# Patient Record
Sex: MALE | Race: WHITE | NOT HISPANIC OR LATINO | URBAN - METROPOLITAN AREA
[De-identification: names, ages, dates, MRNs, and addresses within clinical notes are randomized per-mention and may not be internally consistent; named-entity substitution may affect disease eponyms.]

---

## 2017-06-09 ENCOUNTER — OUTPATIENT (OUTPATIENT)
Dept: OUTPATIENT SERVICES | Facility: HOSPITAL | Age: 72
LOS: 1 days | Discharge: HOME | End: 2017-06-09

## 2017-06-09 DIAGNOSIS — K40.90 UNILATERAL INGUINAL HERNIA, WITHOUT OBSTRUCTION OR GANGRENE, NOT SPECIFIED AS RECURRENT: ICD-10-CM

## 2017-06-09 DIAGNOSIS — I10 ESSENTIAL (PRIMARY) HYPERTENSION: ICD-10-CM

## 2017-06-09 DIAGNOSIS — I48.91 UNSPECIFIED ATRIAL FIBRILLATION: ICD-10-CM

## 2017-06-23 ENCOUNTER — OUTPATIENT (OUTPATIENT)
Dept: OUTPATIENT SERVICES | Facility: HOSPITAL | Age: 72
LOS: 1 days | Discharge: HOME | End: 2017-06-23

## 2017-06-26 ENCOUNTER — EMERGENCY (EMERGENCY)
Facility: HOSPITAL | Age: 72
LOS: 0 days | Discharge: HOME | End: 2017-06-26

## 2017-06-26 DIAGNOSIS — Z98.890 OTHER SPECIFIED POSTPROCEDURAL STATES: ICD-10-CM

## 2017-06-26 DIAGNOSIS — Z79.899 OTHER LONG TERM (CURRENT) DRUG THERAPY: ICD-10-CM

## 2017-06-26 DIAGNOSIS — R06.02 SHORTNESS OF BREATH: ICD-10-CM

## 2017-06-26 DIAGNOSIS — I48.91 UNSPECIFIED ATRIAL FIBRILLATION: ICD-10-CM

## 2017-06-26 DIAGNOSIS — Z79.01 LONG TERM (CURRENT) USE OF ANTICOAGULANTS: ICD-10-CM

## 2017-06-26 DIAGNOSIS — I10 ESSENTIAL (PRIMARY) HYPERTENSION: ICD-10-CM

## 2017-06-28 DIAGNOSIS — K40.20 BILATERAL INGUINAL HERNIA, WITHOUT OBSTRUCTION OR GANGRENE, NOT SPECIFIED AS RECURRENT: ICD-10-CM

## 2017-06-28 DIAGNOSIS — Z01.818 ENCOUNTER FOR OTHER PREPROCEDURAL EXAMINATION: ICD-10-CM

## 2017-06-30 DIAGNOSIS — K40.20 BILATERAL INGUINAL HERNIA, WITHOUT OBSTRUCTION OR GANGRENE, NOT SPECIFIED AS RECURRENT: ICD-10-CM

## 2017-06-30 DIAGNOSIS — K41.20 BILATERAL FEMORAL HERNIA, WITHOUT OBSTRUCTION OR GANGRENE, NOT SPECIFIED AS RECURRENT: ICD-10-CM

## 2018-03-27 ENCOUNTER — OUTPATIENT (OUTPATIENT)
Dept: OUTPATIENT SERVICES | Facility: HOSPITAL | Age: 73
LOS: 1 days | Discharge: HOME | End: 2018-03-27

## 2018-03-27 VITALS
OXYGEN SATURATION: 98 % | DIASTOLIC BLOOD PRESSURE: 79 MMHG | RESPIRATION RATE: 16 BRPM | TEMPERATURE: 97 F | WEIGHT: 214.95 LBS | HEART RATE: 60 BPM | SYSTOLIC BLOOD PRESSURE: 141 MMHG

## 2018-03-27 DIAGNOSIS — Z01.818 ENCOUNTER FOR OTHER PREPROCEDURAL EXAMINATION: ICD-10-CM

## 2018-03-27 DIAGNOSIS — K40.20 BILATERAL INGUINAL HERNIA, WITHOUT OBSTRUCTION OR GANGRENE, NOT SPECIFIED AS RECURRENT: Chronic | ICD-10-CM

## 2018-03-27 DIAGNOSIS — Z85.89 PERSONAL HISTORY OF MALIGNANT NEOPLASM OF OTHER ORGANS AND SYSTEMS: Chronic | ICD-10-CM

## 2018-03-27 DIAGNOSIS — K40.90 UNILATERAL INGUINAL HERNIA, WITHOUT OBSTRUCTION OR GANGRENE, NOT SPECIFIED AS RECURRENT: ICD-10-CM

## 2018-03-27 LAB
ALBUMIN SERPL ELPH-MCNC: 3.9 G/DL — SIGNIFICANT CHANGE UP (ref 3.5–5.2)
ALP SERPL-CCNC: 78 U/L — SIGNIFICANT CHANGE UP (ref 30–115)
ALT FLD-CCNC: 18 U/L — SIGNIFICANT CHANGE UP (ref 0–41)
ANION GAP SERPL CALC-SCNC: 13 MMOL/L — SIGNIFICANT CHANGE UP (ref 7–14)
APPEARANCE UR: CLEAR — SIGNIFICANT CHANGE UP
AST SERPL-CCNC: 22 U/L — SIGNIFICANT CHANGE UP (ref 0–41)
BASOPHILS # BLD AUTO: 0.03 K/UL — SIGNIFICANT CHANGE UP (ref 0–0.2)
BASOPHILS NFR BLD AUTO: 0.4 % — SIGNIFICANT CHANGE UP (ref 0–1)
BILIRUB SERPL-MCNC: 0.3 MG/DL — SIGNIFICANT CHANGE UP (ref 0.2–1.2)
BILIRUB UR-MCNC: NEGATIVE — SIGNIFICANT CHANGE UP
BUN SERPL-MCNC: 21 MG/DL — HIGH (ref 10–20)
CALCIUM SERPL-MCNC: 8.7 MG/DL — SIGNIFICANT CHANGE UP (ref 8.5–10.1)
CHLORIDE SERPL-SCNC: 104 MMOL/L — SIGNIFICANT CHANGE UP (ref 98–110)
CO2 SERPL-SCNC: 26 MMOL/L — SIGNIFICANT CHANGE UP (ref 17–32)
COLOR SPEC: YELLOW — SIGNIFICANT CHANGE UP
CREAT SERPL-MCNC: 0.9 MG/DL — SIGNIFICANT CHANGE UP (ref 0.7–1.5)
DIFF PNL FLD: NEGATIVE — SIGNIFICANT CHANGE UP
EOSINOPHIL # BLD AUTO: 0.19 K/UL — SIGNIFICANT CHANGE UP (ref 0–0.7)
EOSINOPHIL NFR BLD AUTO: 2.8 % — SIGNIFICANT CHANGE UP (ref 0–8)
GLUCOSE SERPL-MCNC: 118 MG/DL — HIGH (ref 70–99)
GLUCOSE UR QL: NEGATIVE — SIGNIFICANT CHANGE UP
HCT VFR BLD CALC: 40 % — LOW (ref 42–52)
HGB BLD-MCNC: 12.5 G/DL — LOW (ref 14–18)
IMM GRANULOCYTES NFR BLD AUTO: 0.3 % — SIGNIFICANT CHANGE UP (ref 0.1–0.3)
KETONES UR-MCNC: NEGATIVE — SIGNIFICANT CHANGE UP
LEUKOCYTE ESTERASE UR-ACNC: NEGATIVE — SIGNIFICANT CHANGE UP
LYMPHOCYTES # BLD AUTO: 1.04 K/UL — LOW (ref 1.2–3.4)
LYMPHOCYTES # BLD AUTO: 15.5 % — LOW (ref 20.5–51.1)
MCHC RBC-ENTMCNC: 24.7 PG — LOW (ref 27–31)
MCHC RBC-ENTMCNC: 31.3 G/DL — LOW (ref 32–37)
MCV RBC AUTO: 78.9 FL — LOW (ref 80–94)
MONOCYTES # BLD AUTO: 0.69 K/UL — HIGH (ref 0.1–0.6)
MONOCYTES NFR BLD AUTO: 10.3 % — HIGH (ref 1.7–9.3)
NEUTROPHILS # BLD AUTO: 4.72 K/UL — SIGNIFICANT CHANGE UP (ref 1.4–6.5)
NEUTROPHILS NFR BLD AUTO: 70.7 % — SIGNIFICANT CHANGE UP (ref 42.2–75.2)
NITRITE UR-MCNC: NEGATIVE — SIGNIFICANT CHANGE UP
NRBC # BLD: 0 /100 WBCS — SIGNIFICANT CHANGE UP (ref 0–0)
PH UR: 6 — SIGNIFICANT CHANGE UP (ref 5–8)
PLATELET # BLD AUTO: 209 K/UL — SIGNIFICANT CHANGE UP (ref 130–400)
POTASSIUM SERPL-MCNC: 4.5 MMOL/L — SIGNIFICANT CHANGE UP (ref 3.5–5)
POTASSIUM SERPL-SCNC: 4.5 MMOL/L — SIGNIFICANT CHANGE UP (ref 3.5–5)
PROT SERPL-MCNC: 6.4 G/DL — SIGNIFICANT CHANGE UP (ref 6–8)
PROT UR-MCNC: (no result)
RBC # BLD: 5.07 M/UL — SIGNIFICANT CHANGE UP (ref 4.7–6.1)
RBC # FLD: 14.9 % — HIGH (ref 11.5–14.5)
RBC CASTS # UR COMP ASSIST: SIGNIFICANT CHANGE UP /HPF
SODIUM SERPL-SCNC: 143 MMOL/L — SIGNIFICANT CHANGE UP (ref 135–146)
SP GR SPEC: >=1.03 — SIGNIFICANT CHANGE UP (ref 1.01–1.03)
UROBILINOGEN FLD QL: 0.2 — SIGNIFICANT CHANGE UP (ref 0.2–0.2)
WBC # BLD: 6.69 K/UL — SIGNIFICANT CHANGE UP (ref 4.8–10.8)
WBC # FLD AUTO: 6.69 K/UL — SIGNIFICANT CHANGE UP (ref 4.8–10.8)
WBC UR QL: SIGNIFICANT CHANGE UP /HPF

## 2018-03-28 LAB
APTT BLD: 29.5 SEC — SIGNIFICANT CHANGE UP (ref 27–39.2)
INR BLD: 1.16 RATIO — SIGNIFICANT CHANGE UP (ref 0.65–1.3)
PROTHROM AB SERPL-ACNC: 12.6 SEC — SIGNIFICANT CHANGE UP (ref 9.95–12.87)

## 2018-04-06 ENCOUNTER — OUTPATIENT (OUTPATIENT)
Dept: OUTPATIENT SERVICES | Facility: HOSPITAL | Age: 73
LOS: 1 days | Discharge: HOME | End: 2018-04-06

## 2018-04-06 VITALS
HEART RATE: 54 BPM | TEMPERATURE: 98 F | DIASTOLIC BLOOD PRESSURE: 69 MMHG | SYSTOLIC BLOOD PRESSURE: 134 MMHG | OXYGEN SATURATION: 100 % | RESPIRATION RATE: 16 BRPM | WEIGHT: 214.95 LBS

## 2018-04-06 VITALS
OXYGEN SATURATION: 99 % | HEART RATE: 68 BPM | SYSTOLIC BLOOD PRESSURE: 126 MMHG | RESPIRATION RATE: 16 BRPM | DIASTOLIC BLOOD PRESSURE: 76 MMHG

## 2018-04-06 DIAGNOSIS — Z85.89 PERSONAL HISTORY OF MALIGNANT NEOPLASM OF OTHER ORGANS AND SYSTEMS: Chronic | ICD-10-CM

## 2018-04-06 DIAGNOSIS — K40.20 BILATERAL INGUINAL HERNIA, WITHOUT OBSTRUCTION OR GANGRENE, NOT SPECIFIED AS RECURRENT: Chronic | ICD-10-CM

## 2018-04-06 RX ORDER — SODIUM CHLORIDE 9 MG/ML
1000 INJECTION, SOLUTION INTRAVENOUS
Qty: 0 | Refills: 0 | Status: DISCONTINUED | OUTPATIENT
Start: 2018-04-06 | End: 2018-04-21

## 2018-04-06 RX ORDER — MORPHINE SULFATE 50 MG/1
2 CAPSULE, EXTENDED RELEASE ORAL
Qty: 0 | Refills: 0 | Status: DISCONTINUED | OUTPATIENT
Start: 2018-04-06 | End: 2018-04-06

## 2018-04-06 RX ORDER — MORPHINE SULFATE 50 MG/1
1 CAPSULE, EXTENDED RELEASE ORAL
Qty: 0 | Refills: 0 | Status: DISCONTINUED | OUTPATIENT
Start: 2018-04-06 | End: 2018-04-06

## 2018-04-06 RX ORDER — HYDROMORPHONE HYDROCHLORIDE 2 MG/ML
1 INJECTION INTRAMUSCULAR; INTRAVENOUS; SUBCUTANEOUS
Qty: 0 | Refills: 0 | Status: DISCONTINUED | OUTPATIENT
Start: 2018-04-06 | End: 2018-04-06

## 2018-04-06 RX ORDER — OXYCODONE AND ACETAMINOPHEN 5; 325 MG/1; MG/1
1 TABLET ORAL EVERY 4 HOURS
Qty: 0 | Refills: 0 | Status: DISCONTINUED | OUTPATIENT
Start: 2018-04-06 | End: 2018-04-06

## 2018-04-06 RX ORDER — ONDANSETRON 8 MG/1
4 TABLET, FILM COATED ORAL ONCE
Qty: 0 | Refills: 0 | Status: DISCONTINUED | OUTPATIENT
Start: 2018-04-06 | End: 2018-04-21

## 2018-04-06 RX ORDER — ACETAMINOPHEN WITH CODEINE 300MG-30MG
1 TABLET ORAL
Qty: 20 | Refills: 0 | OUTPATIENT
Start: 2018-04-06 | End: 2018-04-10

## 2018-04-06 RX ADMIN — SODIUM CHLORIDE 100 MILLILITER(S): 9 INJECTION, SOLUTION INTRAVENOUS at 09:50

## 2018-04-06 NOTE — BRIEF OPERATIVE NOTE - PROCEDURE
<<-----Click on this checkbox to enter Procedure Open repair of left inguinal hernia with mesh  04/06/2018    Active  DRANEV

## 2018-04-11 DIAGNOSIS — K40.91 UNILATERAL INGUINAL HERNIA, WITHOUT OBSTRUCTION OR GANGRENE, RECURRENT: ICD-10-CM

## 2018-04-30 ENCOUNTER — INPATIENT (INPATIENT)
Facility: HOSPITAL | Age: 73
LOS: 1 days | Discharge: HOME | End: 2018-05-02
Attending: INTERNAL MEDICINE | Admitting: INTERNAL MEDICINE

## 2018-04-30 VITALS
TEMPERATURE: 96 F | RESPIRATION RATE: 16 BRPM | OXYGEN SATURATION: 94 % | WEIGHT: 220.02 LBS | HEART RATE: 91 BPM | SYSTOLIC BLOOD PRESSURE: 130 MMHG | DIASTOLIC BLOOD PRESSURE: 67 MMHG

## 2018-04-30 DIAGNOSIS — R56.9 UNSPECIFIED CONVULSIONS: ICD-10-CM

## 2018-04-30 DIAGNOSIS — K40.20 BILATERAL INGUINAL HERNIA, WITHOUT OBSTRUCTION OR GANGRENE, NOT SPECIFIED AS RECURRENT: Chronic | ICD-10-CM

## 2018-04-30 DIAGNOSIS — K29.70 GASTRITIS, UNSPECIFIED, WITHOUT BLEEDING: ICD-10-CM

## 2018-04-30 DIAGNOSIS — I48.91 UNSPECIFIED ATRIAL FIBRILLATION: ICD-10-CM

## 2018-04-30 DIAGNOSIS — E87.1 HYPO-OSMOLALITY AND HYPONATREMIA: ICD-10-CM

## 2018-04-30 DIAGNOSIS — I10 ESSENTIAL (PRIMARY) HYPERTENSION: ICD-10-CM

## 2018-04-30 DIAGNOSIS — Z85.89 PERSONAL HISTORY OF MALIGNANT NEOPLASM OF OTHER ORGANS AND SYSTEMS: Chronic | ICD-10-CM

## 2018-04-30 DIAGNOSIS — E78.5 HYPERLIPIDEMIA, UNSPECIFIED: ICD-10-CM

## 2018-04-30 LAB
ALBUMIN SERPL ELPH-MCNC: 4.1 G/DL — SIGNIFICANT CHANGE UP (ref 3.5–5.2)
ALP SERPL-CCNC: 69 U/L — SIGNIFICANT CHANGE UP (ref 30–115)
ALT FLD-CCNC: 16 U/L — SIGNIFICANT CHANGE UP (ref 0–41)
ANION GAP SERPL CALC-SCNC: 12 MMOL/L — SIGNIFICANT CHANGE UP (ref 7–14)
ANION GAP SERPL CALC-SCNC: 13 MMOL/L — SIGNIFICANT CHANGE UP (ref 7–14)
ANION GAP SERPL CALC-SCNC: 13 MMOL/L — SIGNIFICANT CHANGE UP (ref 7–14)
APPEARANCE UR: CLEAR — SIGNIFICANT CHANGE UP
APTT BLD: 19.4 SEC — CRITICAL LOW (ref 27–39.2)
AST SERPL-CCNC: 22 U/L — SIGNIFICANT CHANGE UP (ref 0–41)
BACTERIA # UR AUTO: (no result) /HPF
BILIRUB SERPL-MCNC: 0.5 MG/DL — SIGNIFICANT CHANGE UP (ref 0.2–1.2)
BILIRUB UR-MCNC: NEGATIVE — SIGNIFICANT CHANGE UP
BUN SERPL-MCNC: 13 MG/DL — SIGNIFICANT CHANGE UP (ref 10–20)
BUN SERPL-MCNC: 19 MG/DL — SIGNIFICANT CHANGE UP (ref 10–20)
BUN SERPL-MCNC: 20 MG/DL — SIGNIFICANT CHANGE UP (ref 10–20)
CALCIUM SERPL-MCNC: 8.5 MG/DL — SIGNIFICANT CHANGE UP (ref 8.5–10.1)
CALCIUM SERPL-MCNC: 8.6 MG/DL — SIGNIFICANT CHANGE UP (ref 8.5–10.1)
CALCIUM SERPL-MCNC: 8.7 MG/DL — SIGNIFICANT CHANGE UP (ref 8.5–10.1)
CHLORIDE SERPL-SCNC: 91 MMOL/L — LOW (ref 98–110)
CHLORIDE SERPL-SCNC: 93 MMOL/L — LOW (ref 98–110)
CHLORIDE SERPL-SCNC: 99 MMOL/L — SIGNIFICANT CHANGE UP (ref 98–110)
CK MB CFR SERPL CALC: 4 NG/ML — SIGNIFICANT CHANGE UP (ref 0.6–6.3)
CO2 SERPL-SCNC: 22 MMOL/L — SIGNIFICANT CHANGE UP (ref 17–32)
CO2 SERPL-SCNC: 24 MMOL/L — SIGNIFICANT CHANGE UP (ref 17–32)
CO2 SERPL-SCNC: 25 MMOL/L — SIGNIFICANT CHANGE UP (ref 17–32)
COLOR SPEC: YELLOW — SIGNIFICANT CHANGE UP
CREAT SERPL-MCNC: 0.7 MG/DL — SIGNIFICANT CHANGE UP (ref 0.7–1.5)
CREAT SERPL-MCNC: 0.8 MG/DL — SIGNIFICANT CHANGE UP (ref 0.7–1.5)
CREAT SERPL-MCNC: 0.9 MG/DL — SIGNIFICANT CHANGE UP (ref 0.7–1.5)
DIFF PNL FLD: (no result)
GAS PNL BLDV: SIGNIFICANT CHANGE UP
GLUCOSE SERPL-MCNC: 106 MG/DL — HIGH (ref 70–99)
GLUCOSE SERPL-MCNC: 139 MG/DL — HIGH (ref 70–99)
GLUCOSE SERPL-MCNC: 181 MG/DL — HIGH (ref 70–99)
GLUCOSE UR QL: 100 MG/DL
HCT VFR BLD CALC: 37.1 % — LOW (ref 42–52)
HGB BLD-MCNC: 11.8 G/DL — LOW (ref 14–18)
INR BLD: 1.13 RATIO — SIGNIFICANT CHANGE UP (ref 0.65–1.3)
KETONES UR-MCNC: NEGATIVE — SIGNIFICANT CHANGE UP
LEUKOCYTE ESTERASE UR-ACNC: NEGATIVE — SIGNIFICANT CHANGE UP
MAGNESIUM SERPL-MCNC: 1.8 MG/DL — SIGNIFICANT CHANGE UP (ref 1.8–2.4)
MCHC RBC-ENTMCNC: 24.4 PG — LOW (ref 27–31)
MCHC RBC-ENTMCNC: 31.8 G/DL — LOW (ref 32–37)
MCV RBC AUTO: 76.7 FL — LOW (ref 80–94)
NITRITE UR-MCNC: NEGATIVE — SIGNIFICANT CHANGE UP
NRBC # BLD: 0 /100 WBCS — SIGNIFICANT CHANGE UP (ref 0–0)
OSMOLALITY SERPL: 276 MOS/KG — LOW (ref 289–308)
PH UR: 6.5 — SIGNIFICANT CHANGE UP (ref 5–8)
PLATELET # BLD AUTO: 189 K/UL — SIGNIFICANT CHANGE UP (ref 130–400)
POTASSIUM SERPL-MCNC: 4 MMOL/L — SIGNIFICANT CHANGE UP (ref 3.5–5)
POTASSIUM SERPL-MCNC: 4.1 MMOL/L — SIGNIFICANT CHANGE UP (ref 3.5–5)
POTASSIUM SERPL-MCNC: 4.6 MMOL/L — SIGNIFICANT CHANGE UP (ref 3.5–5)
POTASSIUM SERPL-SCNC: 4 MMOL/L — SIGNIFICANT CHANGE UP (ref 3.5–5)
POTASSIUM SERPL-SCNC: 4.1 MMOL/L — SIGNIFICANT CHANGE UP (ref 3.5–5)
POTASSIUM SERPL-SCNC: 4.6 MMOL/L — SIGNIFICANT CHANGE UP (ref 3.5–5)
PROT SERPL-MCNC: 6.6 G/DL — SIGNIFICANT CHANGE UP (ref 6–8)
PROT UR-MCNC: NEGATIVE MG/DL — SIGNIFICANT CHANGE UP
PROTHROM AB SERPL-ACNC: 12.2 SEC — SIGNIFICANT CHANGE UP (ref 9.95–12.87)
RBC # BLD: 4.84 M/UL — SIGNIFICANT CHANGE UP (ref 4.7–6.1)
RBC # FLD: 14.9 % — HIGH (ref 11.5–14.5)
RBC CASTS # UR COMP ASSIST: SIGNIFICANT CHANGE UP /HPF
SODIUM SERPL-SCNC: 126 MMOL/L — LOW (ref 135–146)
SODIUM SERPL-SCNC: 130 MMOL/L — LOW (ref 135–146)
SODIUM SERPL-SCNC: 136 MMOL/L — SIGNIFICANT CHANGE UP (ref 135–146)
SP GR SPEC: 1.01 — SIGNIFICANT CHANGE UP (ref 1.01–1.03)
TROPONIN T SERPL-MCNC: <0.01 NG/ML — SIGNIFICANT CHANGE UP
UROBILINOGEN FLD QL: 0.2 MG/DL — SIGNIFICANT CHANGE UP (ref 0.2–0.2)
WBC # BLD: 11.9 K/UL — HIGH (ref 4.8–10.8)
WBC # FLD AUTO: 11.9 K/UL — HIGH (ref 4.8–10.8)

## 2018-04-30 RX ORDER — ONDANSETRON 8 MG/1
4 TABLET, FILM COATED ORAL EVERY 4 HOURS
Qty: 0 | Refills: 0 | Status: DISCONTINUED | OUTPATIENT
Start: 2018-04-30 | End: 2018-05-02

## 2018-04-30 RX ORDER — APIXABAN 2.5 MG/1
5 TABLET, FILM COATED ORAL ONCE
Qty: 0 | Refills: 0 | Status: COMPLETED | OUTPATIENT
Start: 2018-04-30 | End: 2018-04-30

## 2018-04-30 RX ORDER — LEVETIRACETAM 250 MG/1
1000 TABLET, FILM COATED ORAL ONCE
Qty: 0 | Refills: 0 | Status: COMPLETED | OUTPATIENT
Start: 2018-04-30 | End: 2018-04-30

## 2018-04-30 RX ORDER — SOTALOL HCL 120 MG
120 TABLET ORAL EVERY 12 HOURS
Qty: 0 | Refills: 0 | Status: DISCONTINUED | OUTPATIENT
Start: 2018-04-30 | End: 2018-05-01

## 2018-04-30 RX ORDER — AMLODIPINE BESYLATE 2.5 MG/1
0 TABLET ORAL
Qty: 0 | Refills: 0 | COMMUNITY

## 2018-04-30 RX ORDER — LANOLIN ALCOHOL/MO/W.PET/CERES
0 CREAM (GRAM) TOPICAL
Qty: 0 | Refills: 0 | COMMUNITY

## 2018-04-30 RX ORDER — SODIUM CHLORIDE 9 MG/ML
2000 INJECTION, SOLUTION INTRAVENOUS ONCE
Qty: 0 | Refills: 0 | Status: COMPLETED | OUTPATIENT
Start: 2018-04-30 | End: 2018-04-30

## 2018-04-30 RX ORDER — SOTALOL HCL 120 MG
120 TABLET ORAL ONCE
Qty: 0 | Refills: 0 | Status: COMPLETED | OUTPATIENT
Start: 2018-04-30 | End: 2018-04-30

## 2018-04-30 RX ORDER — ACETAMINOPHEN 500 MG
650 TABLET ORAL ONCE
Qty: 0 | Refills: 0 | Status: COMPLETED | OUTPATIENT
Start: 2018-04-30 | End: 2018-04-30

## 2018-04-30 RX ORDER — HYDROMORPHONE HYDROCHLORIDE 2 MG/ML
1 INJECTION INTRAMUSCULAR; INTRAVENOUS; SUBCUTANEOUS ONCE
Qty: 0 | Refills: 0 | Status: DISCONTINUED | OUTPATIENT
Start: 2018-04-30 | End: 2018-04-30

## 2018-04-30 RX ORDER — SOTALOL HCL 120 MG
1 TABLET ORAL
Qty: 0 | Refills: 0 | COMMUNITY

## 2018-04-30 RX ORDER — OXYCODONE AND ACETAMINOPHEN 5; 325 MG/1; MG/1
1 TABLET ORAL EVERY 6 HOURS
Qty: 0 | Refills: 0 | Status: DISCONTINUED | OUTPATIENT
Start: 2018-04-30 | End: 2018-05-02

## 2018-04-30 RX ORDER — MORPHINE SULFATE 50 MG/1
2 CAPSULE, EXTENDED RELEASE ORAL EVERY 6 HOURS
Qty: 0 | Refills: 0 | Status: DISCONTINUED | OUTPATIENT
Start: 2018-04-30 | End: 2018-05-02

## 2018-04-30 RX ORDER — BENAZEPRIL HYDROCHLORIDE 40 MG/1
1 TABLET ORAL
Qty: 0 | Refills: 0 | COMMUNITY

## 2018-04-30 RX ORDER — PANTOPRAZOLE SODIUM 20 MG/1
40 TABLET, DELAYED RELEASE ORAL
Qty: 0 | Refills: 0 | Status: DISCONTINUED | OUTPATIENT
Start: 2018-04-30 | End: 2018-05-02

## 2018-04-30 RX ORDER — APIXABAN 2.5 MG/1
5 TABLET, FILM COATED ORAL EVERY 12 HOURS
Qty: 0 | Refills: 0 | Status: DISCONTINUED | OUTPATIENT
Start: 2018-04-30 | End: 2018-05-02

## 2018-04-30 RX ORDER — SODIUM CHLORIDE 9 MG/ML
1000 INJECTION INTRAMUSCULAR; INTRAVENOUS; SUBCUTANEOUS ONCE
Qty: 0 | Refills: 0 | Status: COMPLETED | OUTPATIENT
Start: 2018-04-30 | End: 2018-04-30

## 2018-04-30 RX ORDER — LANOLIN ALCOHOL/MO/W.PET/CERES
10 CREAM (GRAM) TOPICAL AT BEDTIME
Qty: 0 | Refills: 0 | Status: DISCONTINUED | OUTPATIENT
Start: 2018-04-30 | End: 2018-04-30

## 2018-04-30 RX ORDER — ATORVASTATIN CALCIUM 80 MG/1
80 TABLET, FILM COATED ORAL AT BEDTIME
Qty: 0 | Refills: 0 | Status: DISCONTINUED | OUTPATIENT
Start: 2018-04-30 | End: 2018-05-02

## 2018-04-30 RX ORDER — LISINOPRIL 2.5 MG/1
40 TABLET ORAL DAILY
Qty: 0 | Refills: 0 | Status: DISCONTINUED | OUTPATIENT
Start: 2018-04-30 | End: 2018-05-02

## 2018-04-30 RX ORDER — MAGNESIUM SULFATE 500 MG/ML
2 VIAL (ML) INJECTION ONCE
Qty: 0 | Refills: 0 | Status: COMPLETED | OUTPATIENT
Start: 2018-04-30 | End: 2018-04-30

## 2018-04-30 RX ADMIN — LISINOPRIL 40 MILLIGRAM(S): 2.5 TABLET ORAL at 13:27

## 2018-04-30 RX ADMIN — HYDROMORPHONE HYDROCHLORIDE 1 MILLIGRAM(S): 2 INJECTION INTRAMUSCULAR; INTRAVENOUS; SUBCUTANEOUS at 14:15

## 2018-04-30 RX ADMIN — LEVETIRACETAM 400 MILLIGRAM(S): 250 TABLET, FILM COATED ORAL at 04:47

## 2018-04-30 RX ADMIN — SODIUM CHLORIDE 2000 MILLILITER(S): 9 INJECTION INTRAMUSCULAR; INTRAVENOUS; SUBCUTANEOUS at 05:59

## 2018-04-30 RX ADMIN — ONDANSETRON 4 MILLIGRAM(S): 8 TABLET, FILM COATED ORAL at 18:59

## 2018-04-30 RX ADMIN — ATORVASTATIN CALCIUM 80 MILLIGRAM(S): 80 TABLET, FILM COATED ORAL at 22:01

## 2018-04-30 RX ADMIN — SODIUM CHLORIDE 1000 MILLILITER(S): 9 INJECTION, SOLUTION INTRAVENOUS at 04:35

## 2018-04-30 RX ADMIN — MORPHINE SULFATE 2 MILLIGRAM(S): 50 CAPSULE, EXTENDED RELEASE ORAL at 11:31

## 2018-04-30 RX ADMIN — APIXABAN 5 MILLIGRAM(S): 2.5 TABLET, FILM COATED ORAL at 18:19

## 2018-04-30 RX ADMIN — MORPHINE SULFATE 2 MILLIGRAM(S): 50 CAPSULE, EXTENDED RELEASE ORAL at 13:14

## 2018-04-30 RX ADMIN — Medication 650 MILLIGRAM(S): at 11:31

## 2018-04-30 RX ADMIN — Medication 50 GRAM(S): at 17:55

## 2018-04-30 RX ADMIN — Medication 120 MILLIGRAM(S): at 18:20

## 2018-04-30 RX ADMIN — Medication 120 MILLIGRAM(S): at 13:58

## 2018-04-30 RX ADMIN — PANTOPRAZOLE SODIUM 40 MILLIGRAM(S): 20 TABLET, DELAYED RELEASE ORAL at 13:27

## 2018-04-30 RX ADMIN — HYDROMORPHONE HYDROCHLORIDE 1 MILLIGRAM(S): 2 INJECTION INTRAMUSCULAR; INTRAVENOUS; SUBCUTANEOUS at 14:06

## 2018-04-30 RX ADMIN — APIXABAN 5 MILLIGRAM(S): 2.5 TABLET, FILM COATED ORAL at 13:15

## 2018-04-30 RX ADMIN — Medication 650 MILLIGRAM(S): at 10:07

## 2018-04-30 NOTE — ED PROVIDER NOTE - CRITICAL CARE PROVIDED
additional history taking/documentation/interpretation of diagnostic studies/consultation with other physicians/direct patient care (not related to procedure)/consult w/ pt's family directly relating to pts condition

## 2018-04-30 NOTE — ED PROVIDER NOTE - PROGRESS NOTE DETAILS
sodium on vbg and cmp not consistnet, will repeat. pt awake and alert, givem keppra due to questionable  seizure, will reassess. spoke to [place],  will continue NS, pt will be seen in am spoke to Dr. Tejas Montero,  will see pt in am and decide on EEG sodium improved on repeat, 130, cxr with questionable left lower lobe infiltrate, will give dose of abx, neurology aware of pt, will follow. pt resting on stretcher no seizure like activity in ed, received keppra. Na improving,  call placed to Dr. Art,  endorsed to MAR

## 2018-04-30 NOTE — CONSULT NOTE ADULT - ASSESSMENT
73 YO man with a Hx of HTN, DL and A fib presenting with seizure like activity who was found to have hyponatremia    #Moderate hyponatremia with euvolemia.  -No clear cause of hyponatremia at this time, could be due to medication side effect since the patient took and ACEi and ARB.  -Unlikely to have caused the seizure at this level.  -Hyponatremia already improved with IV NS, would continue with only fluid restriction for now  -Monitor I/O    #HTN.  -Well controlled now, continue with lisinopril    #Seizure.  -Pending neurology evaluation. 73 YO man with a Hx of HTN, DL and A fib presenting with seizure like activity who was found to have hyponatremia    #Moderate hyponatremia with euvolemia.  -No clear cause of hyponatremia at this time, could be due to medication side effect since the patient took and ACEi and ARB.Could be also following rapid ingestion of free water   -Unlikely to have caused the seizure at this level.  -Hyponatremia already improved with IV NS, would continue with only fluid restriction for now  -Monitor I/O    #HTN.  -Well controlled now, continue with lisinopril    #Seizure.  -Pending neurology evaluation.    will sign off recall PRN please

## 2018-04-30 NOTE — CONSULT NOTE ADULT - SUBJECTIVE AND OBJECTIVE BOX
NEPHROLOGY CONSULTATION NOTE    72 year old man with PMH of HTN, DLD, Gastritis, and Atrial fibrillation on Eliquis presenting after having seizure like activity at home with crossing of his arms and a depressed mental status. Prior to the onset of seizure the patient's wife asked him to check his blood pressure and it was 200/97. She called his PMD who told her to give him Amlodipine 5mg and then later Irbesartan 300mg as well which she usually takes, and she was told to recheck in a few hours. The patient drank 3 16oz bottles of water. At 10:30 PM, patient's BP was rechecked and it was 175/86 for which he took Clonidine 0.1mg. The blood pressure was rechecked in bed at around 1:30 AM and it was found to be 127/78. During his sleep after, the patient became stiff and was rhythmically crossing and uncrossing his arms as per the wife who thought he was having a seizure and called 911. The patient regained consciousness following the event but remained to be confused and lethargic for a few hours before returning to his baseline mental status.  There is no history of fevers or chills recently. No focal neurological deficits. There is no history of increased thirst of increase in water intake over the last  few days. No history of LLE, no change in urine output.  Upon arrival to the ED the patient's Na was found to be 126 and the patient was given 2 l of NS after which the Na increased to 131.          PAST MEDICAL & SURGICAL HISTORY:  Malaria  Gastritis  Dyslipidemia  Squamous cell carcinoma: scalp  Afib  HTN (hypertension)  History of squamous cell carcinoma: scalp ,  Hernia, inguinal, bilateral    Allergies:  No Known Allergies    Home Medications Reviewed  Hospital Medications:   MEDICATIONS  (STANDING):  apixaban 5 milliGRAM(s) Oral every 12 hours  atorvastatin 80 milliGRAM(s) Oral at bedtime  lisinopril 40 milliGRAM(s) Oral daily  pantoprazole    Tablet 40 milliGRAM(s) Oral before breakfast  sotalol 120 milliGRAM(s) Oral every 12 hours      SOCIAL HISTORY:  Denies ETOH,Smoking,   FAMILY HISTORY:  No pertinent family history in first degree relatives        REVIEW OF SYSTEMS:  CONSTITUTIONAL: No weakness, fevers or chills  EYES/ENT: No visual changes;  No vertigo or throat pain   NECK: No pain or stiffness  RESPIRATORY: No cough, wheezing, hemoptysis; No shortness of breath  CARDIOVASCULAR: No chest pain or palpitations.  GASTROINTESTINAL: No abdominal or epigastric pain. No nausea, vomiting, or hematemesis; No diarrhea or constipation. No melena or hematochezia.  GENITOURINARY: No dysuria, frequency, foamy urine, urinary urgency, incontinence or hematuria  SKIN: No itching, burning, rashes, or lesions   VASCULAR: No bilateral lower extremity edema.   All other review of systems is negative unless indicated above.    VITALS:  T(F): 97.8 (18 @ 07:21), Max: 97.8 (18 @ 07:21)  HR: 82 (18 @ :21)  BP: 141/72 (18 @ 07:21)  RR: 16 (18 @ 07:21)  SpO2: 98% (18 @ :21)      Weight (kg): 99.8 ( @ 03:42)      Creatine Kinase, Serum: 189 U/L (18 @ 04:04)      PHYSICAL EXAM:  GENERAL APPEARANCE:  alert and cooperative, and appears to be in no acute distress.  HEENT: unremarkable  THROAT: Oral cavity and pharynx normal. No inflammation, swelling, exudate, or lesions. Teeth and gingiva in good general condition.  NECK: Neck supple, non-tender without lymphadenopathy, masses or thyromegaly.  CARDIAC: RRR, Normal S1 and S2. No S3, S4 or murmurs  LUNGS: Clear to auscultation without rales, rhonchi or wheezing.  ABDOMEN: Positive bowel sounds. Soft, nondistended, nontender. No guarding or rebound. No HSM.  EXTREMITIES: No edema. Peripheral pulses intact. No varicosities.    LABS:      130<L>  |  93<L>  |  19  ----------------------------<  139<H>  4.6   |  24  |  0.8    Ca    8.6      2018 05:11    TPro  6.6  /  Alb  4.1  /  TBili  0.5  /  DBili      /  AST  22  /  ALT  16  /  AlkPhos  69      Creatinine Trend: 0.8 <--, 0.9 <--                        11.8   11.90 )-----------( 189      ( 2018 04:04 )             37.1     Urine Studies:  Urinalysis Basic - ( 2018 04:15 )    Color: Yellow / Appearance: Clear / S.015 / pH:   Gluc:  / Ketone: Negative  / Bili: Negative / Urobili: 0.2 mg/dL   Blood:  / Protein: Negative mg/dL / Nitrite: Negative   Leuk Esterase: Negative / RBC: 1-2 /HPF / WBC    Sq Epi:  / Non Sq Epi:  / Bacteria: Few /HPF                RADIOLOGY & ADDITIONAL STUDIES:    < from: CT Head No Cont (18 @ 04:18) >  Limited by motion artifact.    No CT evidence of acute intracranial pathology.    < end of copied text > NEPHROLOGY CONSULTATION NOTE    72 year old man with PMH of HTN, DLD, Gastritis, and Atrial fibrillation on Eliquis presenting after having seizure like activity at home with crossing of his arms and a depressed mental status. Prior to the onset of seizure the patient's wife asked him to check his blood pressure and it was 200/97. She called his PMD who told her to give him Amlodipine 5mg and then later Irbesartan 300mg as well which she usually takes, and she was told to recheck in a few hours. The patient drank 3 16oz bottles of water. At 10:30 PM, patient's BP was rechecked and it was 175/86 for which he took Clonidine 0.1mg. The blood pressure was rechecked in bed at around 1:30 AM and it was found to be 127/78. During his sleep after, the patient became stiff and was rhythmically crossing and uncrossing his arms as per the wife who thought he was having a seizure and called 911. The patient regained consciousness following the event but remained to be confused and lethargic for a few hours before returning to his baseline mental status.  There is no history of fevers or chills recently. No focal neurological deficits. There is no history of increased thirst of increase in water intake over the last  few days. No history of LLE, no change in urine output.  Upon arrival to the ED the patient's Na was found to be 126 and the patient was given 2 l of NS after which the Na increased to 131.          PAST MEDICAL & SURGICAL HISTORY:  Malaria  Gastritis  Dyslipidemia  Squamous cell carcinoma: scalp  Afib  HTN (hypertension)  History of squamous cell carcinoma: scalp ,  Hernia, inguinal, bilateral    Allergies:  No Known Allergies    Home Medications Reviewed  Hospital Medications:   MEDICATIONS  (STANDING):  apixaban 5 milliGRAM(s) Oral every 12 hours  atorvastatin 80 milliGRAM(s) Oral at bedtime  lisinopril 40 milliGRAM(s) Oral daily  pantoprazole    Tablet 40 milliGRAM(s) Oral before breakfast  sotalol 120 milliGRAM(s) Oral every 12 hours      SOCIAL HISTORY:  Denies ETOH,Smoking,   FAMILY HISTORY:  No pertinent family history in first degree relatives        REVIEW OF SYSTEMS:  CONSTITUTIONAL: No weakness, fevers or chills  EYES/ENT: No visual changes;  No vertigo or throat pain   NECK: No pain or stiffness  RESPIRATORY: No cough, wheezing, hemoptysis; No shortness of breath  CARDIOVASCULAR: No chest pain or palpitations.  GASTROINTESTINAL: No abdominal or epigastric pain. No nausea, vomiting, or hematemesis; No diarrhea or constipation. No melena or hematochezia.  GENITOURINARY: No dysuria, frequency, foamy urine, urinary urgency, incontinence or hematuria  SKIN: No itching, burning, rashes, or lesions   VASCULAR: No bilateral lower extremity edema.   All other review of systems is negative unless indicated above.    VITALS:  T(F): 97.8 (18 @ 07:21), Max: 97.8 (18 @ 07:21)  HR: 82 (18 @ :21)  BP: 141/72 (18 @ 07:21)  RR: 16 (18 @ :21)  SpO2: 98% (18:21)      Weight (kg): 99.8 ( 03:42)      Creatine Kinase, Serum: 189 U/L (18 @ 04:04)      PHYSICAL EXAM:  GENERAL APPEARANCE:  alert and cooperative, and appears to be in no acute distress.  HEENT: unremarkable  THROAT: Oral cavity and pharynx normal. No inflammation, swelling, exudate, or lesions. Teeth and gingiva in good general condition.  NECK: Neck supple, non-tender without lymphadenopathy, masses or thyromegaly.  CARDIAC: RRR, Normal S1 and S2. No S3, S4 or murmurs  LUNGS: Clear to auscultation without rales, rhonchi or wheezing.  ABDOMEN: Positive bowel sounds. Soft, nondistended, nontender. No guarding or rebound. No HSM.  EXTREMITIES: No edema. Peripheral pulses intact. No varicosities.    LABS:      130<L>  |  93<L>  |  19  ----------------------------<  139<H>  4.6   |  24  |  0.8  SODIUM TREND:  Sodium 130 [ @ 05:11]  Sodium 126 [ 04:04]    Ca    8.6      2018 05:11    TPro  6.6  /  Alb  4.1  /  TBili  0.5  /  DBili      /  AST  22  /  ALT  16  /  AlkPhos  69      Creatinine Trend: 0.8 <--, 0.9 <--                        11.8   11.90 )-----------( 189      ( 2018 04:04 )             37.1     Urine Studies:  Urinalysis Basic - ( 2018 04:15 )    Color: Yellow / Appearance: Clear / S.015 / pH:   Gluc:  / Ketone: Negative  / Bili: Negative / Urobili: 0.2 mg/dL   Blood:  / Protein: Negative mg/dL / Nitrite: Negative   Leuk Esterase: Negative / RBC: 1-2 /HPF / WBC    Sq Epi:  / Non Sq Epi:  / Bacteria: Few /HPF                RADIOLOGY & ADDITIONAL STUDIES:    < from: CT Head No Cont (18 @ 04:18) >  Limited by motion artifact.    No CT evidence of acute intracranial pathology.    < end of copied text >

## 2018-04-30 NOTE — ED PROVIDER NOTE - ATTENDING CONTRIBUTION TO CARE
I personally evaluated the patient. I reviewed the Resident’s or Physician Assistant’s note (as assigned above), and agree with the findings and plan except as documented in my note.  I was present for and supervised the key/critical aspects of the procedures performed during the care of the patient.  A 73 y/o m w/ pmhx of htn, dld, hernia sx three weeks ago Dr. Hammond), afib on Eliquis (Dr. Fowler cardiologist) presents after wife reports this evening found pt with repetitive movements, crossing arms and hitting chest, was not aware of this happening, lasted about five minutes. wife indicates son has seizures so reports this seemed like a seizure. wife also reports pt was complaining of sinus pressure, took Excedrin, flonase, and then his bp which was 198/95 earlier this afternoon, called Dr. Grimaldo who stated to take an extra 5 mg of his amlodipine as well as avapro, bp was still elevated so Dr. Grimaldo called in 1 mg of clonidine , pt took this as well. bp improved and then pt went to sleep when wife found him doing repetitive movements at 2:30 a.m. and called for ambulance. never happened before. during episode wife also indicates pt was foaming at the mouth, no tongue biting, no urinary or bowel incontinence. no head trauma. pt was post-ictal afterwards. denies fever, chills, n/v, cp, sob, pleuritic cp, palpitations, diaphoresis, cough, neck pain/stiffness, back pain, photophobia/phonophobia, blurry vision/visual changes, abd pain, diarrhea, constipation, melena/brbpr, urinary symptoms, numbness/tingling, current HA/LH/dizziness, edema, calf pain/swelling/erythema, sick contacts, recent travel or rash. never had anything like this before.   on exam: wdwn male sitting on stretcher in nad, appears post-ictal, no rash, no signs of trauma, PERRL, EOM intact, no nystagmus, mmm, neck supple, no spinous ttp to neck or back, FROM, no palpable shelves or step offs, no meningeal signs, regular rate, radial pulses 2/4 b/l, ctabl w/ breath sounds present b/l, no wheezing or crackles, good air exchange, good respiratory effort, no accessory muscle use, no tachypnea, no stridor, bs present throughout all 4 quadrants, abd soft, nd, nt, no rebound tenderness or guarding, no cvat, FROM of upper an lower ext, no calf pain/swelling/erythema, AAOx3. Motor 5/5 and sensation intact throughout upper and lower ext. CN II-XII intact. No facial droop or slurring of speech. (-) Pronator, no dysmetria w/ ftn or rapid alternating fine movements. NIH O. I personally evaluated the patient. I reviewed the Resident’s or Physician Assistant’s note (as assigned above), and agree with the findings and plan except as documented in my note.  I was present for and supervised the key/critical aspects of the procedures performed during the care of the patient.  A 71 y/o m w/ pmhx of htn, dld, hernia sx three weeks ago Dr. Hammond), afib on Eliquis (Dr. Fowler cardiologist) presents after wife reports this evening found pt with repetitive movements, crossing arms and hitting chest, was not aware of this happening, lasted about five minutes. wife indicates son has seizures so reports this seemed like a seizure. wife also reports pt was complaining of sinus pressure, took Excedrin, flonase, and then his bp which was 198/95 earlier this afternoon, called Dr. Grimaldo who stated to take an extra 5 mg of his amlodipine as well as avapro, bp was still elevated so Dr. Grimaldo called in 1 mg of clonidine , pt took this as well. bp improved and then pt went to sleep when wife found him doing repetitive movements at 2:30 a.m. and called for ambulance. never happened before. during episode wife also indicates pt was foaming at the mouth, no tongue biting, no urinary or bowel incontinence. no head trauma. pt was post-ictal afterwards. p and wife also report dry cough, pt coughing on exam. denies fever, chills, n/v, cp, sob, pleuritic cp, palpitations, diaphoresis,, neck pain/stiffness, back pain, photophobia/phonophobia, blurry vision/visual changes, abd pain, diarrhea, constipation, melena/brbpr, urinary symptoms, numbness/tingling, current HA/LH/dizziness, edema, calf pain/swelling/erythema, sick contacts, recent travel or rash. never had anything like this before.   on exam: wdwn male sitting on stretcher in nad, appears post-ictal, no rash, no signs of trauma, PERRL, EOM intact, no nystagmus, mmm, neck supple, no spinous ttp to neck or back, FROM, no palpable shelves or step offs, no meningeal signs, regular rate, radial pulses 2/4 b/l, ctabl w/ breath sounds present b/l decreased to left lower lung base, no wheezing or crackles, poor air exchange, poor respiratory effort, no accessory muscle use, no tachypnea, no stridor, bs present throughout all 4 quadrants, abd soft, nd, nt, no rebound tenderness or guarding, no cvat, FROM of upper and lower ext, no calf pain/swelling/erythema, AAOx3. Motor 5/5 and sensation intact throughout upper and lower ext. CN II-XII intact. No facial droop or slurring of speech. (-) Pronator, no dysmetria w/ ftn or rapid alternating fine movements. NIH O.

## 2018-04-30 NOTE — ED ADULT TRIAGE NOTE - ARRIVAL INFO ADDITIONAL COMMENTS
EMS reports patient altered and combative on scene for >5 minutes, about to give Versed until patient calmed down.

## 2018-04-30 NOTE — H&P ADULT - ATTENDING COMMENTS
Pt seen and examined independently of resident, agree with history , physical exam, assessment and plan    Addendum    1- ? seizure  get Neuro   EEG    2- Hyponatremia  improving   continue with free water restriction  seen by Nephrology    3- HTN uncontrolled  continue home meds  add Norvasc if BP still high    4- A fib  HR controlled  Continue A/C

## 2018-04-30 NOTE — ED ADULT NURSE REASSESSMENT NOTE - GENERAL PATIENT STATE
resting/sleeping/no further seizure like activity noted at this time./family/SO at bedside/comfortable appearance

## 2018-04-30 NOTE — ED PROVIDER NOTE - CARE PLAN
Assessment and plan of treatment:	Plan: EKG, CXR, CT head, labs, ivf, urine, neurology cx, plan for admission, will continue to monitor and reassess. Principal Discharge DX:	Seizure  Assessment and plan of treatment:	Plan: EKG, CXR, CT head, labs, ivf, urine, neurology cx, plan for admission, will continue to monitor and reassess.  Secondary Diagnosis:	Hyponatremia

## 2018-04-30 NOTE — H&P ADULT - PROBLEM SELECTOR PLAN 2
126 now improved to 131, given 2L LR in ED and 1l NS. Will check serum and urine osmolarity, urine electrolytes, do not correct more than 8 mEqs/24hrs. Follow up with nephrology.

## 2018-04-30 NOTE — H&P ADULT - HISTORY OF PRESENT ILLNESS
72 year old male with PMH of HTN, DLD, Gastritis, Malaria, SCC of the scalp and Atrial fibrillation on Eliquis presents after having a hypertensive crisis followed by seizure like activity. Patient woke up yesterday with a bifrontal headache and lightheadedness that persisted throughout the day. Patient has had this type of headache in the past and it was attributed to sinus disease. The patient took Excedrin and one tab of Ibuprofen 800mg without improvement in the headache.  Seeing as the headache persisted into the evening, the patient's wife asked him to check his blood pressure and it was 200/97. Patient usually runs around 130-140 SBP. She called his PMD Dr. Grimaldo who told her to give him Amlodipine 5mg and asked her what other BP meds she had at home and she gave him Irbesartan 300mg as well which she usually takes, and she was told to recheck in a few hours. The wife also told him to drink 3 16oz bottles of water. At 10:30 PM, patient's BP was rechecked and it was 175/86 and Dr. Grimaldo was informed and prescribed the patient Clonidine 0.1mg. The patient then went to bed. The blood pressure was rechecked in bed at around 1:30 AM and it was found to be 127/78. During his sleep after, the patient became stiff and was rhythmically crossing and uncrossing his arms as per the wife who thought he was having a seizure and called 911. She thought that he was going to vomit but he didn't. Patient kept having those rhythmic movements for about 5 minutes as per the wife. No tongue biting or loss of urine. He was then restless and confused when EMS arrived. Patient has no recollection of the incident and is still somewhat confused. He was unsure at whether he had 3 kids and did not know the date. This is the first episode of seizures in his lifetime. Otherwise, as per wife and patient, he had no fever, chills, nausea, vomiting, cough, chest or abdominal pain, diarrhea, constipation, or urinary symptoms.

## 2018-04-30 NOTE — H&P ADULT - NSHPLABSRESULTS_GEN_ALL_CORE
11.8   11.90 )-----------( 189      ( 2018 04:04 )             37.1         130<L>  |  93<L>  |  19  ----------------------------<  139<H>  4.6   |  24  |  0.8    Ca    8.6      2018 05:11  TPro  6.6  /  Alb  4.1  /  TBili  0.5  /  DBili  x   /  AST  22  /  ALT  16  /  AlkPhos  69  30        Urinalysis Basic - ( 2018 04:15 )    Color: Yellow / Appearance: Clear / S.015 / pH: x  Gluc: x / Ketone: Negative  / Bili: Negative / Urobili: 0.2 mg/dL   Blood: x / Protein: Negative mg/dL / Nitrite: Negative   Leuk Esterase: Negative / RBC: 1-2 /HPF / WBC x   Sq Epi: x / Non Sq Epi: x / Bacteria: Few /HPF    PT/INR - ( 2018 04:04 )   PT: 12.20 sec;   INR: 1.13 ratio    PTT - ( 2018 04:04 )  PTT:19.4 sec    CARDIAC MARKERS ( 2018 04:21 )  x     / <0.01 ng/mL / x     / x     / 4.0 ng/mL  CARDIAC MARKERS ( 2018 04:04 )  x     / x     / 189 U/L / x     / x    CXR: Prelim no radiologic evidence of acute cardiopulmonary changes.

## 2018-04-30 NOTE — PROGRESS NOTE ADULT - SUBJECTIVE AND OBJECTIVE BOX
Neurology Consult    Patient is a 72y old  Male who presents with a chief complaint of Hypertensive crisis followed by seizure like activity (2018 08:54)      HPI:  72 year old male with PMH of HTN, DLD, Gastritis, Malaria, SCC of the scalp and Atrial fibrillation on Eliquis presents after having a hypertensive crisis followed by seizure like activity. Patient woke up yesterday with a bifrontal headache and lightheadedness that persisted throughout the day. Patient has had this type of headache in the past and it was attributed to sinus disease. The patient took Excedrin and one tab of Ibuprofen 800mg without improvement in the headache.  Seeing as the headache persisted into the evening, the patient's wife asked him to check his blood pressure and it was 200/97. Patient usually runs around 130-140 SBP. She called his PMD Dr. Grimaldo who told her to give him Amlodipine 5mg and asked her what other BP meds she had at home and she gave him Irbesartan 300mg as well which she usually takes, and she was told to recheck in a few hours. The wife also told him to drink 3 16oz bottles of water. At 10:30 PM, patient's BP was rechecked and it was 175/86 and Dr. Grimaldo was informed and prescribed the patient Clonidine 0.1mg. The patient then went to bed. The blood pressure was rechecked in bed at around 1:30 AM and it was found to be 127/78. During his sleep after, the patient became stiff and was rhythmically crossing and uncrossing his arms as per the wife who thought he was having a seizure and called 911. She thought that he was going to vomit but he didn't. Patient kept having those rhythmic movements for about 5 minutes as per the wife. No tongue biting or loss of urine. He was then restless and confused when EMS arrived. Patient has no recollection of the incident and is still somewhat confused. He was unsure at whether he had 3 kids and did not know the date. This is the first episode of seizures in his lifetime. Otherwise, as per wife and patient, he had no fever, chills, nausea, vomiting, cough, chest or abdominal pain, diarrhea, constipation, or urinary symptoms. (2018 08:54)        PAST MEDICAL & SURGICAL HISTORY:  Malaria  Gastritis  Dyslipidemia  Squamous cell carcinoma: scalp  Afib  HTN (hypertension)  History of squamous cell carcinoma: scalp , moh&#x27;s procedure  Hernia, inguinal, bilateral      FAMILY HISTORY:  No pertinent family history in first degree relatives      Social History: (-) x 3    Allergies    No Known Allergies    Intolerances        MEDICATIONS  (STANDING):  apixaban 5 milliGRAM(s) Oral every 12 hours  atorvastatin 80 milliGRAM(s) Oral at bedtime  lisinopril 40 milliGRAM(s) Oral daily  pantoprazole    Tablet 40 milliGRAM(s) Oral before breakfast  sotalol 120 milliGRAM(s) Oral every 12 hours    MEDICATIONS  (PRN):  morphine  - Injectable 2 milliGRAM(s) IV Push every 6 hours PRN Severe Pain (7 - 10)  ondansetron Injectable 4 milliGRAM(s) IV Push every 4 hours PRN Nausea and/or Vomiting  oxyCODONE    5 mG/acetaminophen 325 mG 1 Tablet(s) Oral every 6 hours PRN Moderate Pain (4 - 6)      Review of systems:    Constitutional: No fever, weight loss or fatigue    Eyes: No eye pain or discharge  ENMT:  No difficulty hearing; No sinus or throat pain  Neck: No pain or stiffness  Respiratory: No cough, wheezing, chills or hemoptysis  Cardiovascular: No chest pain, palpitations, shortness of breath, dyspnea on exertion  Gastrointestinal: No abdominal pain, nausea, vomiting or hematemesis; No diarrhea or constipation.   Genitourinary: No dysuria, frequency, hematuria or incontinence  Neurological: As per HPI  Skin: No rashes or lesions   Endocrine: No heat or cold intolerance; No hair loss  Musculoskeletal: No joint pain or swelling  Psychiatric: No depression, anxiety, mood swings  Heme/Lymph: No easy bruising or bleeding gums    Vital Signs Last 24 Hrs  T(C): 36.7 (2018 21:15), Max: 36.7 (2018 21:15)  T(F): 98.1 (2018 21:15), Max: 98.1 (2018 21:15)  HR: 76 (2018 21:15) (67 - 91)  BP: 171/84 (2018 21:15) (130/67 - 187/88)  BP(mean): --  RR: 18 (2018 21:15) (16 - 18)  SpO2: 95% (2018 19:54) (94% - 98%)    Neurologic Examination:  General:  Appearance is consistent with chronologic age.  No abnormal facies.   General: The patient is oriented to person, place, time and date.  Recent and remote memory intact.  Fund of knowledge is intact and normal.  Language with normal repetition, comprehension and naming.  Nondysarthric.    Cranial nerves: intact VA, VFF.  EOMI w/o nystagmus, skew or reported double vision.  PERRL (though pupils small secondary to pain meds received).  No ptosis/weakness of eyelid closure.  Facial sensation is normal with normal bite.  No facial asymmetry.  Hearing grossly intact b/l.  Palate elevates midline.  Tongue midline.  Tongue had maceration on left side.  Motor examination:   Normal tone, bulk and range of motion.  No tenderness, twitching, tremors or involuntary movements.  Formal Muscle Strength Testing: (MRC grade R/L) 5/5 UE; 5/5 LE.  No observable drift.  Reflexes:   2+ b/l  biceps, triceps, brachioradialis, patella and Achilles.  Plantar response downgoing b/l.  clonus absent.  Sensory examination:   Intact to light touch, temperature and proprioception and vibration in all extremities.  Cerebellum:   FTN/HKS intact with normal [place] in all limbs.  No dysmetria or dysdiadokinesia.  Gait narrow based and normal.    Labs:   CBC Full  -  ( 2018 04:04 )  WBC Count : 11.90 K/uL  Hemoglobin : 11.8 g/dL  Hematocrit : 37.1 %  Platelet Count - Automated : 189 K/uL  Mean Cell Volume : 76.7 fL  Mean Cell Hemoglobin : 24.4 pg  Mean Cell Hemoglobin Concentration : 31.8 g/dL  Auto Neutrophil # : x  Auto Lymphocyte # : x  Auto Monocyte # : x  Auto Eosinophil # : x  Auto Basophil # : x  Auto Neutrophil % : x  Auto Lymphocyte % : x  Auto Monocyte % : x  Auto Eosinophil % : x  Auto Basophil % : x        136  |  99  |  13  ----------------------------<  106<H>  4.0   |  25  |  0.7    Ca    8.5      2018 11:47  Mg     1.8         TPro  6.6  /  Alb  4.1  /  TBili  0.5  /  DBili  x   /  AST  22  /  ALT  16  /  AlkPhos  69      LIVER FUNCTIONS - ( 2018 04:04 )  Alb: 4.1 g/dL / Pro: 6.6 g/dL / ALK PHOS: 69 U/L / ALT: 16 U/L / AST: 22 U/L / GGT: x           PT/INR - ( 2018 04:04 )   PT: 12.20 sec;   INR: 1.13 ratio         PTT - ( 2018 04:04 )  PTT:19.4 sec  Urinalysis Basic - ( 2018 04:15 )    Color: Yellow / Appearance: Clear / S.015 / pH: x  Gluc: x / Ketone: Negative  / Bili: Negative / Urobili: 0.2 mg/dL   Blood: x / Protein: Negative mg/dL / Nitrite: Negative   Leuk Esterase: Negative / RBC: 1-2 /HPF / WBC x   Sq Epi: x / Non Sq Epi: x / Bacteria: Few /HPF          Neuroimaging:  < from: CT Head No Cont (18 @ 04:18) >  IMPRESSION:    Limited by motion artifact.    No CT evidence of acute intracranial pathology.    If patient continues to be symptomatic follow-up MRI of the brain may be   helpful for further evaluation.    < end of copied text >       (18 @ 04:18)        Assessment:  This is a 72y Male with h/o new onset seizure.  His exam and head CT were unremarkable.    Plan:   1.  MRI silviano with and without contrast.  2.  EEG.  3.  If focalities on MRI or EEG consider maintaining on anticonvulsant.  4.  No driving for 6 months.  No tub baths.    18 @ 23:54

## 2018-04-30 NOTE — H&P ADULT - NSHPSOCIALHISTORY_GEN_ALL_CORE
Social History:    Marital Status:  ( x )    (   ) Single    (   )    (  )   Occupation: Retired , [employer] [place]  Lives with: (  ) alone  (  ) children   ( x ) spouse   (  ) parents  (  ) other    Substance Use (street drugs): ( x ) never used  (  ) other:  Tobacco Usage:  (   ) never smoked   ( x ) former smoker   (   ) current smoker  ( 10 ) pack year  (20 years ago) last cigarette date  Alcohol Usage: Social

## 2018-04-30 NOTE — H&P ADULT - NSHPPHYSICALEXAM_GEN_ALL_CORE
T(C): 36.6 (04-30-18 @ 07:21), Max: 36.6 (04-30-18 @ 07:21)  HR: 82 (04-30-18 @ 07:21) (79 - 91)  BP: 141/72 (04-30-18 @ 07:21) (130/67 - 160/70)  RR: 16 (04-30-18 @ 07:21) (16 - 18)  SpO2: 98% (04-30-18 @ 07:21) (94% - 98%)    PHYSICAL EXAM:  GENERAL: NAD, well-developed  HEAD:  Atraumatic, Normocephalic  EYES: EOMI, PERRLA, conjunctiva and sclera clear  ENT: Normal tympanic membrane. No nasal obstruction or discharge. No tonsillar exudate, swelling or erythema.  NECK: Supple, No JVD  CHEST/LUNG: Clear to auscultation bilaterally; No wheeze  HEART: Regular rate and rhythm; No murmurs, rubs, or gallops  ABDOMEN: Soft, Nontender, Nondistended; Bowel sounds present  EXTREMITIES:  2+ Peripheral Pulses, No clubbing, cyanosis, or edema  PSYCH: Confused, AAOx2 is not oriented to time, affected long and short term memory  NEUROLOGY: non-focal  SKIN: No rashes or lesions

## 2018-04-30 NOTE — ED PROVIDER NOTE - PLAN OF CARE
Plan: EKG, CXR, CT head, labs, ivf, urine, neurology cx, plan for admission, will continue to monitor and reassess.

## 2018-04-30 NOTE — ED PROVIDER NOTE - NS ED ROS FT
Eyes:  No visual changes, eye pain or discharge.  ENMT:  No hearing changes, pain, no sore throat or runny nose, no difficulty swallowing  Cardiac:  No chest pain, SOB or edema. No chest pain with exertion.  Respiratory:  No cough or respiratory distress. No hemoptysis. No history of asthma or RAD.  GI:  No nausea, vomiting, diarrhea or abdominal pain.  :  No dysuria, frequency or burning.  MS:  No myalgia, muscle weakness, joint pain or back pain.  Neuro:  lethargy,  seizure  Skin:  No skin rash.   Endocrine: No history of thyroid disease or diabetes.

## 2018-04-30 NOTE — H&P ADULT - ASSESSMENT
72 year old male with PMH of HTN, DLD, Gastritis, Malaria, SCC of the scalp and Atrial fibrillation on Eliquis presents after having a hypertensive crisis followed by seizure like activity.

## 2018-04-30 NOTE — ED PROVIDER NOTE - PHYSICAL EXAMINATION
CONSTITUTIONAL: Well-developed; well-nourished; in no acute distress.   SKIN: warm, dry  HEAD: Normocephalic; atraumatic.  EYES: PERRL, EOMI, no conjunctival erythema  ENT: No nasal discharge; airway clear.  NECK: Supple; non tender.  CARD: S1, S2 normal; no murmurs, gallops, or rubs. Regular rate and rhythm.   RESP: No wheezes, rales or rhonchi.  ABD: soft ntnd  EXT: Normal ROM.  No clubbing, cyanosis or edema.   LYMPH: No acute cervical adenopathy.  NEURO: Alert, oriented, CN intact, no gross motor or sensory deficit, no cerebellar signs    PSYCH: Cooperative, appropriate.

## 2018-04-30 NOTE — ED ADULT TRIAGE NOTE - CHIEF COMPLAINT QUOTE
He was in bed making these movements that remind me  of a seizure, my son has a seizure history - wife

## 2018-04-30 NOTE — H&P ADULT - PMH
Afib    Dyslipidemia    Gastritis    HTN (hypertension)    Malaria    Squamous cell carcinoma  scalp

## 2018-04-30 NOTE — ED ADULT NURSE NOTE - OBJECTIVE STATEMENT
Pt was found by wife repeatedly moving arms crossed on his chest with eyes closed PTA. Pt was complaining of headache and dizziness all day yesterday.

## 2018-04-30 NOTE — CONSULT NOTE ADULT - ATTENDING COMMENTS
I was Physically Present for the key portions of the evaluation   I agree with the above History  , Physical examination Assessment and plan   I have Reviewed , Modified or appended where appropriate.  Please check A and P as above   1- HTN better controlled on ACE inh  2- Hyponatremia resolved

## 2018-05-01 LAB
ALBUMIN SERPL ELPH-MCNC: 3.7 G/DL — SIGNIFICANT CHANGE UP (ref 3.5–5.2)
ALP SERPL-CCNC: 61 U/L — SIGNIFICANT CHANGE UP (ref 30–115)
ALT FLD-CCNC: 19 U/L — SIGNIFICANT CHANGE UP (ref 0–41)
ANION GAP SERPL CALC-SCNC: 9 MMOL/L — SIGNIFICANT CHANGE UP (ref 7–14)
AST SERPL-CCNC: 43 U/L — HIGH (ref 0–41)
BASOPHILS # BLD AUTO: 0.02 K/UL — SIGNIFICANT CHANGE UP (ref 0–0.2)
BASOPHILS NFR BLD AUTO: 0.2 % — SIGNIFICANT CHANGE UP (ref 0–1)
BILIRUB SERPL-MCNC: 0.4 MG/DL — SIGNIFICANT CHANGE UP (ref 0.2–1.2)
BUN SERPL-MCNC: 13 MG/DL — SIGNIFICANT CHANGE UP (ref 10–20)
CALCIUM SERPL-MCNC: 8.5 MG/DL — SIGNIFICANT CHANGE UP (ref 8.5–10.1)
CHLORIDE SERPL-SCNC: 105 MMOL/L — SIGNIFICANT CHANGE UP (ref 98–110)
CO2 SERPL-SCNC: 28 MMOL/L — SIGNIFICANT CHANGE UP (ref 17–32)
CREAT SERPL-MCNC: 0.8 MG/DL — SIGNIFICANT CHANGE UP (ref 0.7–1.5)
CULTURE RESULTS: NO GROWTH — SIGNIFICANT CHANGE UP
EOSINOPHIL # BLD AUTO: 0.03 K/UL — SIGNIFICANT CHANGE UP (ref 0–0.7)
EOSINOPHIL NFR BLD AUTO: 0.3 % — SIGNIFICANT CHANGE UP (ref 0–8)
GLUCOSE SERPL-MCNC: 110 MG/DL — HIGH (ref 70–99)
HCT VFR BLD CALC: 35.1 % — LOW (ref 42–52)
HGB BLD-MCNC: 10.9 G/DL — LOW (ref 14–18)
IMM GRANULOCYTES NFR BLD AUTO: 0.5 % — HIGH (ref 0.1–0.3)
LYMPHOCYTES # BLD AUTO: 1.27 K/UL — SIGNIFICANT CHANGE UP (ref 1.2–3.4)
LYMPHOCYTES # BLD AUTO: 13.4 % — LOW (ref 20.5–51.1)
MAGNESIUM SERPL-MCNC: 2.3 MG/DL — SIGNIFICANT CHANGE UP (ref 1.8–2.4)
MCHC RBC-ENTMCNC: 24.3 PG — LOW (ref 27–31)
MCHC RBC-ENTMCNC: 31.1 G/DL — LOW (ref 32–37)
MCV RBC AUTO: 78.2 FL — LOW (ref 80–94)
MONOCYTES # BLD AUTO: 1.04 K/UL — HIGH (ref 0.1–0.6)
MONOCYTES NFR BLD AUTO: 11 % — HIGH (ref 1.7–9.3)
NEUTROPHILS # BLD AUTO: 7.04 K/UL — HIGH (ref 1.4–6.5)
NEUTROPHILS NFR BLD AUTO: 74.6 % — SIGNIFICANT CHANGE UP (ref 42.2–75.2)
PLATELET # BLD AUTO: 171 K/UL — SIGNIFICANT CHANGE UP (ref 130–400)
POTASSIUM SERPL-MCNC: 4.3 MMOL/L — SIGNIFICANT CHANGE UP (ref 3.5–5)
POTASSIUM SERPL-SCNC: 4.3 MMOL/L — SIGNIFICANT CHANGE UP (ref 3.5–5)
PROT SERPL-MCNC: 5.9 G/DL — LOW (ref 6–8)
RBC # BLD: 4.49 M/UL — LOW (ref 4.7–6.1)
RBC # FLD: 15.3 % — HIGH (ref 11.5–14.5)
SODIUM SERPL-SCNC: 142 MMOL/L — SIGNIFICANT CHANGE UP (ref 135–146)
SPECIMEN SOURCE: SIGNIFICANT CHANGE UP
WBC # BLD: 9.45 K/UL — SIGNIFICANT CHANGE UP (ref 4.8–10.8)
WBC # FLD AUTO: 9.45 K/UL — SIGNIFICANT CHANGE UP (ref 4.8–10.8)

## 2018-05-01 RX ORDER — AMLODIPINE BESYLATE 2.5 MG/1
5 TABLET ORAL DAILY
Qty: 0 | Refills: 0 | Status: DISCONTINUED | OUTPATIENT
Start: 2018-05-01 | End: 2018-05-02

## 2018-05-01 RX ORDER — SOTALOL HCL 120 MG
80 TABLET ORAL
Qty: 0 | Refills: 0 | Status: DISCONTINUED | OUTPATIENT
Start: 2018-05-01 | End: 2018-05-02

## 2018-05-01 RX ORDER — ACETAMINOPHEN 500 MG
650 TABLET ORAL EVERY 6 HOURS
Qty: 0 | Refills: 0 | Status: DISCONTINUED | OUTPATIENT
Start: 2018-05-01 | End: 2018-05-01

## 2018-05-01 RX ORDER — AMLODIPINE BESYLATE 2.5 MG/1
5 TABLET ORAL ONCE
Qty: 0 | Refills: 0 | Status: COMPLETED | OUTPATIENT
Start: 2018-05-01 | End: 2018-05-01

## 2018-05-01 RX ORDER — METHOCARBAMOL 500 MG/1
500 TABLET, FILM COATED ORAL ONCE
Qty: 0 | Refills: 0 | Status: COMPLETED | OUTPATIENT
Start: 2018-05-01 | End: 2018-05-01

## 2018-05-01 RX ADMIN — OXYCODONE AND ACETAMINOPHEN 1 TABLET(S): 5; 325 TABLET ORAL at 11:24

## 2018-05-01 RX ADMIN — AMLODIPINE BESYLATE 5 MILLIGRAM(S): 2.5 TABLET ORAL at 16:58

## 2018-05-01 RX ADMIN — APIXABAN 5 MILLIGRAM(S): 2.5 TABLET, FILM COATED ORAL at 05:29

## 2018-05-01 RX ADMIN — Medication 2 MILLIGRAM(S): at 18:41

## 2018-05-01 RX ADMIN — Medication 80 MILLIGRAM(S): at 18:42

## 2018-05-01 RX ADMIN — APIXABAN 5 MILLIGRAM(S): 2.5 TABLET, FILM COATED ORAL at 18:11

## 2018-05-01 RX ADMIN — Medication 120 MILLIGRAM(S): at 05:28

## 2018-05-01 RX ADMIN — OXYCODONE AND ACETAMINOPHEN 1 TABLET(S): 5; 325 TABLET ORAL at 07:02

## 2018-05-01 RX ADMIN — OXYCODONE AND ACETAMINOPHEN 1 TABLET(S): 5; 325 TABLET ORAL at 12:57

## 2018-05-01 RX ADMIN — METHOCARBAMOL 500 MILLIGRAM(S): 500 TABLET, FILM COATED ORAL at 13:07

## 2018-05-01 RX ADMIN — OXYCODONE AND ACETAMINOPHEN 1 TABLET(S): 5; 325 TABLET ORAL at 05:58

## 2018-05-01 RX ADMIN — Medication 650 MILLIGRAM(S): at 04:53

## 2018-05-01 RX ADMIN — PANTOPRAZOLE SODIUM 40 MILLIGRAM(S): 20 TABLET, DELAYED RELEASE ORAL at 06:07

## 2018-05-01 RX ADMIN — ATORVASTATIN CALCIUM 80 MILLIGRAM(S): 80 TABLET, FILM COATED ORAL at 22:13

## 2018-05-01 RX ADMIN — AMLODIPINE BESYLATE 5 MILLIGRAM(S): 2.5 TABLET ORAL at 22:13

## 2018-05-01 RX ADMIN — LISINOPRIL 40 MILLIGRAM(S): 2.5 TABLET ORAL at 05:29

## 2018-05-01 RX ADMIN — Medication 650 MILLIGRAM(S): at 05:59

## 2018-05-01 NOTE — PROGRESS NOTE ADULT - SUBJECTIVE AND OBJECTIVE BOX
72 year old male with PMH of HTN, DLD, Gastritis, Malaria, SCC of the scalp and Atrial fibrillation on Eliquis presents after having a hypertensive crisis followed by seizure like activity.     Interval history: Pt was evaluated by Neurology, who recommend MRI + EEG, then starting anticonvulsants if there are foci of  seizures appreciated. Both have been ordered and EEG will get done today. Pt complaining of muscle soreness around bilateral arms and lower extremities.    CARDIAC MARKERS ( 2018 04:21 )  x     / <0.01 ng/mL / x     / x     / 4.0 ng/mL  CARDIAC MARKERS ( 2018 04:04 )  x     / x     / 189 U/L / x     / x                    10.9   9.45  )-----------( 171      ( 01 May 2018 07:10 )             35.1     05-  142  |  105  |  13  ----------------------------<  110<H>  4.3   |  28  |  0.8  Ca    8.5      01 May 2018 07:10  Mg     2.3     05-  TPro  5.9<L>  /  Alb  3.7  /  TBili  0.4  /  DBili  x   /  AST  43<H>  /  ALT  19  /  AlkPhos  61  05-01  LIVER FUNCTIONS - ( 01 May 2018 07:10 )  Alb: 3.7 g/dL / Pro: 5.9 g/dL / ALK PHOS: 61 U/L / ALT: 19 U/L / AST: 43 U/L / GGT: x         PT/INR - ( 2018 04:04 )   PT: 12.20 sec;   INR: 1.13 ratio    PTT - ( 2018 04:04 )  PTT:19.4 sec    Urinalysis Basic - ( 2018 04:15 )  Color: Yellow / Appearance: Clear / S.015 / pH: x  Gluc: x / Ketone: Negative  / Bili: Negative / Urobili: 0.2 mg/dL   Blood: x / Protein: Negative mg/dL / Nitrite: Negative   Leuk Esterase: Negative / RBC: 1-2 /HPF / WBC x   Sq Epi: x / Non Sq Epi: x / Bacteria: Few /HPF

## 2018-05-01 NOTE — PROGRESS NOTE ADULT - ASSESSMENT
72 year old male with PMH of HTN, DLD, Gastritis, Malaria, SCC of the scalp and Atrial fibrillation on Eliquis presents after having a hypertensive crisis followed by seizure like activity, found to have some hyponatremia in ER.    1. R/o Seizure liike 72 year old male with PMH of HTN, DLD, Gastritis, Malaria, SCC of the scalp and Atrial fibrillation on Eliquis presents after having a hypertensive crisis followed by seizure like activity, found to have some hyponatremia in ER.    1. R/o Seizure like activity in setting of htn crisis and hyponatremia  -pending MRI w/ and w/o + EEG, will call Neurology with final result  -will give ativan before MRI bc of claustrophobia  -not currently on any anticonvulsants at this time, denies any recurring seizure like activity  -pt states that he feels more clear minded and able to answer questions with more accuracy  -hyponatremia resolved, Renal evaluated pt and signed off, no need for any further IVF, only fluid restriction  -Mg>2, no need for daily labwork  2. HTN: c/w lisinopril  3. Afib: c/w sotalol and eliquis, HR well controlled  4. DLD: c/w lipitor  5. Gastritis: c/w protonix  6. DVT PPx: c/w eliquis 72 year old male with PMH of HTN, DLD, Gastritis, Malaria, SCC of the scalp and Atrial fibrillation on Eliquis presents after having a hypertensive crisis followed by seizure like activity, found to have some hyponatremia in ER.    1. R/o Seizure like activity in setting of htn crisis and hyponatremia  -pending MRI w/ and w/o + EEG, will call Neurology with final result  -will give ativan before MRI bc of claustrophobia  -not currently on any anticonvulsants at this time, denies any recurring seizure like activity  -pt states that he feels more clear minded and able to answer questions with more accuracy  -hyponatremia resolved, Renal evaluated pt and signed off, no need for any further IVF, only fluid restriction  -Mg>2, no need for daily labwork  2. HTN: c/w lisinopril, add amlodipine 5mg as per Dr Kennedy  3. Afib: c/w sotalol and eliquis, HR well controlled  4. DLD: c/w lipitor  5. Gastritis: c/w protonix  6. DVT PPx: c/w eliquis

## 2018-05-01 NOTE — PROGRESS NOTE ADULT - ASSESSMENT
A/P  1-seizure like activity  seen by neuro  recommended EEG and MRI  awaiting these tests  no Anticonvulsant unless one of the above test positive  Headache still there  will try toradol iv one dose today    2- Hyponatremia  improving   continue with free water restriction  seen by Nephrology    3- HTN uncontrolled still  continue lisinopril  add Norvasc today      4- A fib  HR controlled  Continue A/C .     Discussed with resident taking care of the Pt today  Discussed with family at bedside

## 2018-05-01 NOTE — PROGRESS NOTE ADULT - SUBJECTIVE AND OBJECTIVE BOX
INTERVAL HPI/OVERNIGHT EVENTS:  72 year old male with PMH of HTN, DLD, Gastritis, Malaria, SCC of the scalp and Atrial fibrillation on Eliquis presents after having a hypertensive crisis followed by seizure like activity. Patient woke up  with a bifrontal headache and lightheadedness that persisted throughout the day. Patient has had this type of headache in the past and it was attributed to sinus disease. The patient took Excedrin and one tab of Ibuprofen 800mg without improvement in the headache.  Seeing as the headache persisted into the evening, the patient's wife asked him to check his blood pressure and it was 200/97.    Pt seen and examined for follow up of Headache and seizure like activity  still c/o headache  denies chest pain or sob  had elevated BP today      REVIEW OF SYSTEMS:  CONSTITUTIONAL: No fever, weight loss, or fatigue  EYES: No eye pain, visual disturbances, or discharge  ENMT:  No difficulty hearing, tinnitus, vertigo; No sinus or throat pain  NECK: No pain or stiffness  BREASTS: No pain, masses, or nipple discharge  RESPIRATORY: No cough, wheezing, chills or hemoptysis; No shortness of breath  CARDIOVASCULAR: No chest pain, palpitations, dizziness, or leg swelling  GASTROINTESTINAL: No abdominal or epigastric pain. No nausea, vomiting, or hematemesis; No diarrhea or constipation. No melena or hematochezia.  GENITOURINARY: No dysuria, frequency, hematuria, or incontinence  NEUROLOGICAL  Headache  SKIN: No itching, burning, rashes, or lesions   LYMPH NODES: No enlarged glands  ENDOCRINE: No heat or cold intolerance; No hair loss  MUSCULOSKELETAL: No joint pain or swelling; No muscle, back, or extremity pain  PSYCHIATRIC: No depression, anxiety, mood swings, or difficulty sleeping  HEME/LYMPH: No easy bruising, or bleeding gums  ALLERY AND IMMUNOLOGIC: No hives or eczema    VITALS:  T(F): 98.1, Max: 98.1 (18 @ 21:15)  HR: 63  BP: 175/70  RR: 18  SpO2: 98%    PHYSICAL EXAM:  GENERAL: NAD, well-groomed, well-developed  HEAD:  Atraumatic, Normocephalic  EYES: EOMI, PERRLA, conjunctiva and sclera clear  ENMT: No tonsillar erythema, exudates, or enlargement; Moist mucous membranes, Good dentition, No lesions  NECK: Supple, No JVD, Normal thyroid  NERVOUS SYSTEM:  Alert & Oriented X3, no neuro defecits  CHEST/LUNG: Clear to percussion bilaterally; No rales, rhonchi, wheezing, or rubs  HEART: Regular rate and rhythm; No murmurs, rubs, or gallops  ABDOMEN: Soft, Nontender, Nondistended; Bowel sounds present  EXTREMITIES:  2+ Peripheral Pulses, No clubbing, cyanosis, or edema  LYMPH: No lymphadenopathy noted  SKIN: No rashes or lesions      LABS:  Urinalysis Basic - ( 2018 04:15 )    Color: Yellow / Appearance: Clear / S.015 / pH: x  Gluc: x / Ketone: Negative  / Bili: Negative / Urobili: 0.2 mg/dL   Blood: x / Protein: Negative mg/dL / Nitrite: Negative   Leuk Esterase: Negative / RBC: 1-2 /HPF / WBC x   Sq Epi: x / Non Sq Epi: x / Bacteria: Few /HPF      -    142  |  105  |  13  ----------------------------<  110<H>  4.3   |  28  |  0.8    Ca    8.5      01 May 2018 07:10  Mg     2.3     -    TPro  5.9<L>  /  Alb  3.7  /  TBili  0.4  /  DBili  x   /  AST  43<H>  /  ALT  19  /  AlkPhos  61  05-                          10.9   9.45  )-----------( 171      ( 01 May 2018 07:10 )             35.1       Culture - Urine (collected 2018 04:15)  Source: .Urine Clean Catch (Midstream)  Final Report (01 May 2018 10:50):    No growth          RADIOLOGY & ADDITIONAL TESTS:    Imaging Personally Reviewed:  [ ] YES  [ ] NO    MEDICATIONS:     MEDICATIONS  (STANDING):  apixaban 5 milliGRAM(s) Oral every 12 hours  atorvastatin 80 milliGRAM(s) Oral at bedtime  lisinopril 40 milliGRAM(s) Oral daily  pantoprazole    Tablet 40 milliGRAM(s) Oral before breakfast  sotalol 120 milliGRAM(s) Oral every 12 hours    MEDICATIONS  (PRN):  LORazepam   Injectable 2 milliGRAM(s) IV Push once PRN prior to mri for claustrophobia  morphine  - Injectable 2 milliGRAM(s) IV Push every 6 hours PRN Severe Pain (7 - 10)  ondansetron Injectable 4 milliGRAM(s) IV Push every 4 hours PRN Nausea and/or Vomiting  oxyCODONE    5 mG/acetaminophen 325 mG 1 Tablet(s) Oral every 6 hours PRN Moderate Pain (4 - 6)

## 2018-05-02 ENCOUNTER — TRANSCRIPTION ENCOUNTER (OUTPATIENT)
Age: 73
End: 2018-05-02

## 2018-05-02 VITALS — DIASTOLIC BLOOD PRESSURE: 60 MMHG | HEART RATE: 76 BPM

## 2018-05-02 RX ORDER — LANOLIN ALCOHOL/MO/W.PET/CERES
10 CREAM (GRAM) TOPICAL
Qty: 0 | Refills: 0 | COMMUNITY

## 2018-05-02 RX ORDER — AMLODIPINE BESYLATE 2.5 MG/1
1 TABLET ORAL
Qty: 0 | Refills: 0 | DISCHARGE
Start: 2018-05-02

## 2018-05-02 RX ADMIN — Medication 80 MILLIGRAM(S): at 06:08

## 2018-05-02 RX ADMIN — OXYCODONE AND ACETAMINOPHEN 1 TABLET(S): 5; 325 TABLET ORAL at 12:23

## 2018-05-02 RX ADMIN — PANTOPRAZOLE SODIUM 40 MILLIGRAM(S): 20 TABLET, DELAYED RELEASE ORAL at 06:08

## 2018-05-02 RX ADMIN — OXYCODONE AND ACETAMINOPHEN 1 TABLET(S): 5; 325 TABLET ORAL at 11:33

## 2018-05-02 RX ADMIN — APIXABAN 5 MILLIGRAM(S): 2.5 TABLET, FILM COATED ORAL at 06:08

## 2018-05-02 RX ADMIN — AMLODIPINE BESYLATE 5 MILLIGRAM(S): 2.5 TABLET ORAL at 06:08

## 2018-05-02 RX ADMIN — LISINOPRIL 40 MILLIGRAM(S): 2.5 TABLET ORAL at 06:08

## 2018-05-02 NOTE — DISCHARGE NOTE ADULT - PLAN OF CARE
better BP control New medication amlodipine added to home benazapril. Please check BP daily and follow up with PMD.  EEG showed focal slowing but no epileptiform activity, will not start antiseizure medications at this time as per neurology doctor, but please follow up with Dr Bolanos in 2-4 weeks. Card provided to patient. Also, please take seizure precautions for the next 6 months, such as no driving and no use of tub baths. Please seek medical attention if you have recurrence of seizure like activity. resolved Pt was evaluated by Neurology, no need for any further intervention at this time. continue medical management Continue to take sotalol and eliquis. Please seek medical attention if you notice any bleeding.

## 2018-05-02 NOTE — DISCHARGE NOTE ADULT - CARE PLAN
Principal Discharge DX:	HTN (hypertension)  Goal:	better BP control  Assessment and plan of treatment:	New medication amlodipine added to home benazapril. Please check BP daily and follow up with PMD.  EEG showed focal slowing but no epileptiform activity, will not start antiseizure medications at this time as per neurology doctor, but please follow up with Dr Bolanos in 2-4 weeks. Card provided to patient. Also, please take seizure precautions for the next 6 months, such as no driving and no use of tub baths. Please seek medical attention if you have recurrence of seizure like activity.  Secondary Diagnosis:	Hyponatremia  Goal:	resolved  Assessment and plan of treatment:	Pt was evaluated by Neurology, no need for any further intervention at this time.  Secondary Diagnosis:	Afib  Goal:	continue medical management  Assessment and plan of treatment:	Continue to take sotalol and eliquis. Please seek medical attention if you notice any bleeding.

## 2018-05-02 NOTE — DISCHARGE NOTE ADULT - CARE PROVIDER_API CALL
Blake Grimaldo), Medicine  [address] [place] [address]  Phone: [phone]  Fax: [phone]    Akil Bolanos), Neurology  [address] [place] [address]  Phone: [phone]  Fax: [phone]

## 2018-05-02 NOTE — DISCHARGE NOTE ADULT - PATIENT PORTAL LINK FT
You can access the [facility] [facility] [employer] [facility] Patient Portal, offered by [facility], by registering with the following website: [email or web address]

## 2018-05-02 NOTE — DISCHARGE NOTE ADULT - HOSPITAL COURSE
72 year old male with PMH of HTN, DLD, Gastritis, Malaria, SCC of the scalp and Atrial fibrillation on Eliquis presents after having a hypertensive crisis followed by seizure like activity. Pt was found to have hyponatremia and was worked up by Neurology. Pt was also seen by Neurology, EEG showed focal slowing but no epileptiform discharges. Pt also had MRI done and it did not show any acute changes. Pt will be discharged home without any seizure medications, must follow up with Dr Bolanos in 2-4 weeks, and take seizure precautions for the next 6 months: no driving and no use of tub baths.

## 2018-05-02 NOTE — DISCHARGE NOTE ADULT - MEDICATION SUMMARY - MEDICATIONS TO TAKE
I will START or STAY ON the medications listed below when I get home from the hospital:    benazepril 40 mg oral tablet  -- 1 tab(s) by mouth once a day  -- Indication: For HTN (hypertension)    sotalol 80 mg oral tablet  -- 1.5 tab(s) by mouth 2 times a day  -- Indication: For Afib    Eliquis 5 mg oral tablet  -- 1 tab(s) by mouth 2 times a day  -- Indication: For Afib    rosuvastatin 10 mg oral tablet  -- 1 tab(s) by mouth once a day (at bedtime)  -- Indication: For Dyslipidemia    amLODIPine 5 mg oral tablet  -- 1 tab(s) by mouth once a day  -- Indication: For HTN (hypertension)    Protonix 40 mg oral delayed release tablet  -- 1 tab(s) by mouth once a day  -- Indication: For Gastritis

## 2018-05-02 NOTE — PROGRESS NOTE ADULT - SUBJECTIVE AND OBJECTIVE BOX
INTERVAL HPI/OVERNIGHT EVENTS:    72 year old male with PMH of HTN, DLD, Gastritis, Malaria, SCC of the scalp and Atrial fibrillation on Eliquis presents after having a hypertensive crisis followed by seizure like activity. Patient woke up  with a bifrontal headache and lightheadedness that persisted throughout the day. Patient has had this type of headache in the past and it was attributed to sinus disease. The patient took Excedrin and one tab of Ibuprofen 800mg without improvement in the headache.  Seeing as the headache persisted into the evening, the patient's wife asked him to check his blood pressure and it was 200/97.    Pt seen and examined today for follow up of Headache ,seizure like activity and High BP  today headache better   dizziness better  s/p MRI and EEG    REVIEW OF SYSTEMS:  CONSTITUTIONAL: No fever, weight loss, or fatigue  EYES: No eye pain, visual disturbances, or discharge  ENMT:  No difficulty hearing, tinnitus, vertigo; No sinus or throat pain  NECK: No pain or stiffness  BREASTS: No pain, masses, or nipple discharge  RESPIRATORY: No cough, wheezing, chills or hemoptysis; No shortness of breath  CARDIOVASCULAR: No chest pain, palpitations, dizziness, or leg swelling  GASTROINTESTINAL: No abdominal or epigastric pain. No nausea, vomiting, or hematemesis; No diarrhea or constipation. No melena or hematochezia.  GENITOURINARY: No dysuria, frequency, hematuria, or incontinence  NEUROLOGICAL: mild headache  SKIN: No itching, burning, rashes, or lesions   LYMPH NODES: No enlarged glands  ENDOCRINE: No heat or cold intolerance; No hair loss  MUSCULOSKELETAL: No joint pain or swelling; No muscle, back, or extremity pain  PSYCHIATRIC: No depression, anxiety, mood swings, or difficulty sleeping  HEME/LYMPH: No easy bruising, or bleeding gums  ALLERY AND IMMUNOLOGIC: No hives or eczema    VITALS:  T(F): 97.4, Max: 97.4 (05-02-18 @ 05:59)  HR: 76  BP: 150/60  RR: 18  SpO2: 99%    PHYSICAL EXAM:  GENERAL: NAD,  HEAD:  Atraumatic, Normocephalic  EYES: EOMI, PERRLA, conjunctiva and sclera clear  ENMT: No tonsillar erythema, exudates, or enlargement; Moist mucous membranes, Good dentition, No lesions  NECK: Supple, No JVD, Normal thyroid  NERVOUS SYSTEM:  Alert & Oriented X3, Good concentration; Motor Strength 5/5 B/L upper and lower extremities; DTRs 2+ intact and symmetric  CHEST/LUNG: Clear to percussion bilaterally; No rales, rhonchi, wheezing, or rubs  HEART: Regular rate and rhythm; No murmurs, rubs, or gallops  ABDOMEN: Soft, Nontender, Nondistended; Bowel sounds present  EXTREMITIES:  no edema  LYMPH: No lymphadenopathy noted  SKIN: No rashes or lesions      LABS:    05-01    142  |  105  |  13  ----------------------------<  110<H>  4.3   |  28  |  0.8    Ca    8.5      01 May 2018 07:10  Mg     2.3     05-01    TPro  5.9<L>  /  Alb  3.7  /  TBili  0.4  /  DBili  x   /  AST  43<H>  /  ALT  19  /  AlkPhos  61  05-01                          10.9   9.45  )-----------( 171      ( 01 May 2018 07:10 )             35.1       Culture - Blood (collected 30 Apr 2018 11:47)  Source: .Blood None  Preliminary Report (01 May 2018 18:01):    No growth to date.    Culture - Urine (collected 30 Apr 2018 04:15)  Source: .Urine Clean Catch (Midstream)  Final Report (01 May 2018 10:50):    No growth          RADIOLOGY & ADDITIONAL TESTS:    Imaging Personally Reviewed:  [ ] YES  [ ] NO    MEDICATIONS:     MEDICATIONS  (STANDING):  amLODIPine   Tablet 5 milliGRAM(s) Oral daily  apixaban 5 milliGRAM(s) Oral every 12 hours  atorvastatin 80 milliGRAM(s) Oral at bedtime  lisinopril 40 milliGRAM(s) Oral daily  pantoprazole    Tablet 40 milliGRAM(s) Oral before breakfast  sotalol 80 milliGRAM(s) Oral two times a day    MEDICATIONS  (PRN):  morphine  - Injectable 2 milliGRAM(s) IV Push every 6 hours PRN Severe Pain (7 - 10)  ondansetron Injectable 4 milliGRAM(s) IV Push every 4 hours PRN Nausea and/or Vomiting  oxyCODONE    5 mG/acetaminophen 325 mG 1 Tablet(s) Oral every 6 hours PRN Moderate Pain (4 - 6)

## 2018-05-05 LAB
CULTURE RESULTS: SIGNIFICANT CHANGE UP
SPECIMEN SOURCE: SIGNIFICANT CHANGE UP

## 2018-05-06 DIAGNOSIS — Z85.828 PERSONAL HISTORY OF OTHER MALIGNANT NEOPLASM OF SKIN: ICD-10-CM

## 2018-05-06 DIAGNOSIS — E87.1 HYPO-OSMOLALITY AND HYPONATREMIA: ICD-10-CM

## 2018-05-06 DIAGNOSIS — Z79.01 LONG TERM (CURRENT) USE OF ANTICOAGULANTS: ICD-10-CM

## 2018-05-06 DIAGNOSIS — I16.9 HYPERTENSIVE CRISIS, UNSPECIFIED: ICD-10-CM

## 2018-05-06 DIAGNOSIS — R56.9 UNSPECIFIED CONVULSIONS: ICD-10-CM

## 2018-05-06 DIAGNOSIS — I48.91 UNSPECIFIED ATRIAL FIBRILLATION: ICD-10-CM

## 2018-06-02 ENCOUNTER — OUTPATIENT (OUTPATIENT)
Dept: OUTPATIENT SERVICES | Facility: HOSPITAL | Age: 73
LOS: 1 days | Discharge: HOME | End: 2018-06-02

## 2018-06-02 DIAGNOSIS — K85.91 ACUTE PANCREATITIS WITH UNINFECTED NECROSIS, UNSPECIFIED: ICD-10-CM

## 2018-06-02 DIAGNOSIS — K86.1 OTHER CHRONIC PANCREATITIS: ICD-10-CM

## 2018-06-02 DIAGNOSIS — K40.20 BILATERAL INGUINAL HERNIA, WITHOUT OBSTRUCTION OR GANGRENE, NOT SPECIFIED AS RECURRENT: Chronic | ICD-10-CM

## 2018-06-02 DIAGNOSIS — K85.90 ACUTE PANCREATITIS WITHOUT NECROSIS OR INFECTION, UNSPECIFIED: ICD-10-CM

## 2018-06-02 DIAGNOSIS — Z85.89 PERSONAL HISTORY OF MALIGNANT NEOPLASM OF OTHER ORGANS AND SYSTEMS: Chronic | ICD-10-CM

## 2018-06-13 ENCOUNTER — INPATIENT (INPATIENT)
Facility: HOSPITAL | Age: 73
LOS: 1 days | Discharge: HOME | End: 2018-06-15
Attending: INTERNAL MEDICINE | Admitting: INTERNAL MEDICINE

## 2018-06-13 VITALS
WEIGHT: 212.53 LBS | RESPIRATION RATE: 16 BRPM | HEIGHT: 72 IN | TEMPERATURE: 97 F | HEART RATE: 60 BPM | DIASTOLIC BLOOD PRESSURE: 84 MMHG | SYSTOLIC BLOOD PRESSURE: 183 MMHG

## 2018-06-13 DIAGNOSIS — Z85.89 PERSONAL HISTORY OF MALIGNANT NEOPLASM OF OTHER ORGANS AND SYSTEMS: Chronic | ICD-10-CM

## 2018-06-13 DIAGNOSIS — K40.20 BILATERAL INGUINAL HERNIA, WITHOUT OBSTRUCTION OR GANGRENE, NOT SPECIFIED AS RECURRENT: Chronic | ICD-10-CM

## 2018-06-13 LAB
ALBUMIN SERPL ELPH-MCNC: 3.6 G/DL — SIGNIFICANT CHANGE UP (ref 3.5–5.2)
ALP SERPL-CCNC: 65 U/L — SIGNIFICANT CHANGE UP (ref 30–115)
ALT FLD-CCNC: 17 U/L — SIGNIFICANT CHANGE UP (ref 0–41)
ANION GAP SERPL CALC-SCNC: 10 MMOL/L — SIGNIFICANT CHANGE UP (ref 7–14)
AST SERPL-CCNC: 22 U/L — SIGNIFICANT CHANGE UP (ref 0–41)
BILIRUB SERPL-MCNC: <0.2 MG/DL — SIGNIFICANT CHANGE UP (ref 0.2–1.2)
BUN SERPL-MCNC: 15 MG/DL — SIGNIFICANT CHANGE UP (ref 10–20)
CALCIUM SERPL-MCNC: 9 MG/DL — SIGNIFICANT CHANGE UP (ref 8.5–10.1)
CHLORIDE SERPL-SCNC: 104 MMOL/L — SIGNIFICANT CHANGE UP (ref 98–110)
CO2 SERPL-SCNC: 28 MMOL/L — SIGNIFICANT CHANGE UP (ref 17–32)
CREAT SERPL-MCNC: 0.8 MG/DL — SIGNIFICANT CHANGE UP (ref 0.7–1.5)
GLUCOSE SERPL-MCNC: 84 MG/DL — SIGNIFICANT CHANGE UP (ref 70–99)
HCT VFR BLD CALC: 37.9 % — LOW (ref 42–52)
HGB BLD-MCNC: 11.8 G/DL — LOW (ref 14–18)
MAGNESIUM SERPL-MCNC: 2.1 MG/DL — SIGNIFICANT CHANGE UP (ref 1.8–2.4)
MCHC RBC-ENTMCNC: 24.4 PG — LOW (ref 27–31)
MCHC RBC-ENTMCNC: 31.1 G/DL — LOW (ref 32–37)
MCV RBC AUTO: 78.3 FL — LOW (ref 80–94)
NRBC # BLD: 0 /100 WBCS — SIGNIFICANT CHANGE UP (ref 0–0)
PLATELET # BLD AUTO: 174 K/UL — SIGNIFICANT CHANGE UP (ref 130–400)
POTASSIUM SERPL-MCNC: 4.1 MMOL/L — SIGNIFICANT CHANGE UP (ref 3.5–5)
POTASSIUM SERPL-SCNC: 4.1 MMOL/L — SIGNIFICANT CHANGE UP (ref 3.5–5)
PROT SERPL-MCNC: 5.8 G/DL — LOW (ref 6–8)
RBC # BLD: 4.84 M/UL — SIGNIFICANT CHANGE UP (ref 4.7–6.1)
RBC # FLD: 15.9 % — HIGH (ref 11.5–14.5)
SODIUM SERPL-SCNC: 142 MMOL/L — SIGNIFICANT CHANGE UP (ref 135–146)
VALPROATE SERPL-MCNC: 32 UG/ML — LOW (ref 50–100)
WBC # BLD: 6.1 K/UL — SIGNIFICANT CHANGE UP (ref 4.8–10.8)
WBC # FLD AUTO: 6.1 K/UL — SIGNIFICANT CHANGE UP (ref 4.8–10.8)

## 2018-06-13 RX ORDER — OXYCODONE AND ACETAMINOPHEN 5; 325 MG/1; MG/1
1 TABLET ORAL EVERY 4 HOURS
Qty: 0 | Refills: 0 | Status: DISCONTINUED | OUTPATIENT
Start: 2018-06-13 | End: 2018-06-15

## 2018-06-13 RX ORDER — DIVALPROEX SODIUM 500 MG/1
500 TABLET, DELAYED RELEASE ORAL AT BEDTIME
Qty: 0 | Refills: 0 | Status: DISCONTINUED | OUTPATIENT
Start: 2018-06-13 | End: 2018-06-13

## 2018-06-13 RX ORDER — LISINOPRIL 2.5 MG/1
40 TABLET ORAL DAILY
Qty: 0 | Refills: 0 | Status: DISCONTINUED | OUTPATIENT
Start: 2018-06-13 | End: 2018-06-15

## 2018-06-13 RX ORDER — APIXABAN 2.5 MG/1
5 TABLET, FILM COATED ORAL EVERY 12 HOURS
Qty: 0 | Refills: 0 | Status: DISCONTINUED | OUTPATIENT
Start: 2018-06-13 | End: 2018-06-15

## 2018-06-13 RX ORDER — SOTALOL HCL 120 MG
120 TABLET ORAL
Qty: 0 | Refills: 0 | Status: DISCONTINUED | OUTPATIENT
Start: 2018-06-13 | End: 2018-06-13

## 2018-06-13 RX ORDER — DIVALPROEX SODIUM 500 MG/1
500 TABLET, DELAYED RELEASE ORAL AT BEDTIME
Qty: 0 | Refills: 0 | Status: DISCONTINUED | OUTPATIENT
Start: 2018-06-13 | End: 2018-06-14

## 2018-06-13 RX ORDER — LANOLIN ALCOHOL/MO/W.PET/CERES
9 CREAM (GRAM) TOPICAL AT BEDTIME
Qty: 0 | Refills: 0 | Status: DISCONTINUED | OUTPATIENT
Start: 2018-06-13 | End: 2018-06-15

## 2018-06-13 RX ORDER — AMLODIPINE BESYLATE 2.5 MG/1
5 TABLET ORAL DAILY
Qty: 0 | Refills: 0 | Status: DISCONTINUED | OUTPATIENT
Start: 2018-06-13 | End: 2018-06-13

## 2018-06-13 RX ORDER — PANTOPRAZOLE SODIUM 20 MG/1
40 TABLET, DELAYED RELEASE ORAL
Qty: 0 | Refills: 0 | Status: DISCONTINUED | OUTPATIENT
Start: 2018-06-13 | End: 2018-06-15

## 2018-06-13 RX ORDER — ATORVASTATIN CALCIUM 80 MG/1
20 TABLET, FILM COATED ORAL AT BEDTIME
Qty: 0 | Refills: 0 | Status: DISCONTINUED | OUTPATIENT
Start: 2018-06-13 | End: 2018-06-15

## 2018-06-13 RX ORDER — SOTALOL HCL 120 MG
80 TABLET ORAL
Qty: 0 | Refills: 0 | Status: DISCONTINUED | OUTPATIENT
Start: 2018-06-13 | End: 2018-06-15

## 2018-06-13 RX ADMIN — Medication 0.1 MILLIGRAM(S): at 23:21

## 2018-06-13 RX ADMIN — Medication 9 MILLIGRAM(S): at 23:20

## 2018-06-13 RX ADMIN — DIVALPROEX SODIUM 500 MILLIGRAM(S): 500 TABLET, DELAYED RELEASE ORAL at 22:00

## 2018-06-13 RX ADMIN — Medication 80 MILLIGRAM(S): at 17:46

## 2018-06-13 RX ADMIN — APIXABAN 5 MILLIGRAM(S): 2.5 TABLET, FILM COATED ORAL at 17:46

## 2018-06-13 NOTE — H&P ADULT - ASSESSMENT
1. Seizure disorder:   Plan for VEEG  Continue Depakote 500mg daily.     2. Atrial fibrillation:   Continue with Sotalol and Eliquis.   Continue Eliquis.     3. HTN:   Continue Norvasc, Benazepril (Lisinopril).     4. Hyperlipidemia:   On Statin    DVT PPX: On Eliquis.

## 2018-06-13 NOTE — H&P ADULT - HISTORY OF PRESENT ILLNESS
This is 71 yo male with PMH of HTN, AFIB, Hyperlipidemia Gastritis and SCC of scalp came today for schedule VEEG.   Patient was admitted to the [facility] on on 4/30/18 after seizure like episode, symptoms started with severe headache and elevated /100, and at night while he was sleeping he had tonic-clonic movement. He had Brain CT and MRI negative for stroke, EEG was not diagnostic.   Patient started on Depakote 500mg XL.   yesterday, he had severe frontal headache, his BP was > 200, he visited his PCP, he was given Clonidine and his BP improved.   Today he feels ok, no headache, no blurred vision, weakness, numbness, no chest pain or SOB.

## 2018-06-13 NOTE — CONSULT NOTE ADULT - CONSULT REASON
VEEG monitoring for better characterization and treatment plan VEEG monitoring for better characterization of te events  and treatment plan

## 2018-06-13 NOTE — CONSULT NOTE ADULT - ATTENDING COMMENTS
Agree with the history and exam.   The possibility of hypertensive crisis as the cause of initial event /partial seizure with secondary generalization exists  will do the V-EEG for further characterization  no change in depakote

## 2018-06-13 NOTE — PROGRESS NOTE ADULT - SUBJECTIVE AND OBJECTIVE BOX
Patient told RN that he no longer uses norvasc but is on clonidine (he showed her the bottle, it is prn at night) Also says he takes 9mg melatonin at night-orders palced

## 2018-06-13 NOTE — H&P ADULT - NSHPPHYSICALEXAM_GEN_ALL_CORE
GENERAL: NAD, well-developed  HEAD:  Atraumatic, Normocephalic  EYES: EOMI, PERRLA, conjunctiva and sclera clear  NECK: Supple, No JVD  CHEST/LUNG: Clear to auscultation bilaterally; No wheeze  HEART: Regular rate and rhythm; No murmurs, rubs, or gallops  ABDOMEN: Soft, Nontender, Nondistended; Bowel sounds present  EXTREMITIES:  2+ Peripheral Pulses, No clubbing, cyanosis, or edema

## 2018-06-14 RX ORDER — DIVALPROEX SODIUM 500 MG/1
250 TABLET, DELAYED RELEASE ORAL AT BEDTIME
Qty: 0 | Refills: 0 | Status: DISCONTINUED | OUTPATIENT
Start: 2018-06-14 | End: 2018-06-15

## 2018-06-14 RX ORDER — ACETAMINOPHEN 500 MG
650 TABLET ORAL ONCE
Qty: 0 | Refills: 0 | Status: COMPLETED | OUTPATIENT
Start: 2018-06-14 | End: 2018-06-14

## 2018-06-14 RX ADMIN — LISINOPRIL 40 MILLIGRAM(S): 2.5 TABLET ORAL at 05:45

## 2018-06-14 RX ADMIN — APIXABAN 5 MILLIGRAM(S): 2.5 TABLET, FILM COATED ORAL at 05:46

## 2018-06-14 RX ADMIN — Medication 80 MILLIGRAM(S): at 17:49

## 2018-06-14 RX ADMIN — Medication 650 MILLIGRAM(S): at 07:01

## 2018-06-14 RX ADMIN — OXYCODONE AND ACETAMINOPHEN 1 TABLET(S): 5; 325 TABLET ORAL at 03:14

## 2018-06-14 RX ADMIN — ATORVASTATIN CALCIUM 20 MILLIGRAM(S): 80 TABLET, FILM COATED ORAL at 21:49

## 2018-06-14 RX ADMIN — PANTOPRAZOLE SODIUM 40 MILLIGRAM(S): 20 TABLET, DELAYED RELEASE ORAL at 05:49

## 2018-06-14 RX ADMIN — Medication 9 MILLIGRAM(S): at 21:48

## 2018-06-14 RX ADMIN — Medication 0.1 MILLIGRAM(S): at 21:47

## 2018-06-14 RX ADMIN — Medication 650 MILLIGRAM(S): at 07:38

## 2018-06-14 RX ADMIN — APIXABAN 5 MILLIGRAM(S): 2.5 TABLET, FILM COATED ORAL at 17:49

## 2018-06-14 RX ADMIN — DIVALPROEX SODIUM 250 MILLIGRAM(S): 500 TABLET, DELAYED RELEASE ORAL at 21:49

## 2018-06-14 RX ADMIN — OXYCODONE AND ACETAMINOPHEN 1 TABLET(S): 5; 325 TABLET ORAL at 05:00

## 2018-06-14 RX ADMIN — Medication 80 MILLIGRAM(S): at 06:36

## 2018-06-14 NOTE — PROGRESS NOTE ADULT - SUBJECTIVE AND OBJECTIVE BOX
LINDY TRELL  72y  Male      Patient is a 72y old  Male who presents with a chief complaint of VEEG (13 Jun 2018 13:17)      INTERVAL HPI/OVERNIGHT EVENTS:  He still with multiple episodes of Headaches.     Vital Signs Last 24 Hrs  T(C): 36.1 (14 Jun 2018 06:06), Max: 36.1 (14 Jun 2018 06:06)  T(F): 96.9 (14 Jun 2018 06:06), Max: 96.9 (14 Jun 2018 06:06)  HR: 53 (14 Jun 2018 06:06) (53 - 65)  BP: 166/79 (14 Jun 2018 06:06) (138/68 - 166/79)  BP(mean): --  RR: 18 (14 Jun 2018 06:06) (16 - 18)  SpO2: 96% (13 Jun 2018 20:03) (96% - 96%)            Consultant(s) Notes Reviewed:  [x ] YES  [ ] NO          MEDICATIONS  (STANDING):  apixaban 5 milliGRAM(s) Oral every 12 hours  atorvastatin 20 milliGRAM(s) Oral at bedtime  cloNIDine 0.1 milliGRAM(s) Oral at bedtime  diVALproex  milliGRAM(s) Oral at bedtime  lisinopril 40 milliGRAM(s) Oral daily  melatonin 9 milliGRAM(s) Oral at bedtime  pantoprazole    Tablet 40 milliGRAM(s) Oral before breakfast  sotalol 80 milliGRAM(s) Oral two times a day    MEDICATIONS  (PRN):  LORazepam   Injectable 2 milliGRAM(s) IV Push three times a day PRN generalized tonic-clonic seizure lasting longer than 2 minutes, or two consecutive seizures without return to baseline in-between  oxyCODONE    5 mG/acetaminophen 325 mG 1 Tablet(s) Oral every 4 hours PRN Severe Pain (7 - 10)      LABS                          11.8   6.10  )-----------( 174      ( 13 Jun 2018 18:52 )             37.9     06-13    142  |  104  |  15  ----------------------------<  84  4.1   |  28  |  0.8    Ca    9.0      13 Jun 2018 18:52  Mg     2.1     06-13    TPro  5.8<L>  /  Alb  3.6  /  TBili  <0.2  /  DBili  x   /  AST  22  /  ALT  17  /  AlkPhos  65  06-13          Lactate Trend        CAPILLARY BLOOD GLUCOSE            RADIOLOGY & ADDITIONAL TESTS:    Imaging Personally Reviewed:  [ ] YES  [ ] NO    HEALTH ISSUES - PROBLEM Dx:    PHYSICAL EXAM:  GENERAL: NAD, well-developed  HEAD:  Atraumatic, Normocephalic  EYES: EOMI, PERRLA, conjunctiva and sclera clear  NECK: Supple, No JVD  CHEST/LUNG: Clear to auscultation bilaterally; No wheeze  HEART: Regular rate and rhythm; No murmurs, rubs, or gallops  ABDOMEN: Soft, Nontender, Nondistended; Bowel sounds present  EXTREMITIES:  2+ Peripheral Pulses, No clubbing, cyanosis, or edema  PSYCH: AAOx3  NEUROLOGY: non-focal

## 2018-06-14 NOTE — PROGRESS NOTE ADULT - SUBJECTIVE AND OBJECTIVE BOX
Epilepsy Attending Note:     TRELL ISRAEL    72y Male  MRN [id number]    Vital Signs Last 24 Hrs  T(C): 36.1 (14 Jun 2018 06:06), Max: 36.1 (14 Jun 2018 06:06)  T(F): 96.9 (14 Jun 2018 06:06), Max: 96.9 (14 Jun 2018 06:06)  HR: 53 (14 Jun 2018 06:06) (53 - 65)  BP: 166/79 (14 Jun 2018 06:06) (138/68 - 166/79)  BP(mean): --  RR: 18 (14 Jun 2018 06:06) (16 - 18)  SpO2: 96% (13 Jun 2018 20:03) (96% - 96%)                          11.8   6.10  )-----------( 174      ( 13 Jun 2018 18:52 )             37.9       06-13    142  |  104  |  15  ----------------------------<  84  4.1   |  28  |  0.8    Ca    9.0      13 Jun 2018 18:52  Mg     2.1     06-13    TPro  5.8<L>  /  Alb  3.6  /  TBili  <0.2  /  DBili  x   /  AST  22  /  ALT  17  /  AlkPhos  65  06-13      MEDICATIONS  (STANDING):  apixaban 5 milliGRAM(s) Oral every 12 hours  atorvastatin 20 milliGRAM(s) Oral at bedtime  cloNIDine 0.1 milliGRAM(s) Oral at bedtime  diVALproex  milliGRAM(s) Oral at bedtime  lisinopril 40 milliGRAM(s) Oral daily  melatonin 9 milliGRAM(s) Oral at bedtime  pantoprazole    Tablet 40 milliGRAM(s) Oral before breakfast  sotalol 80 milliGRAM(s) Oral two times a day    MEDICATIONS  (PRN):  LORazepam   Injectable 2 milliGRAM(s) IV Push three times a day PRN generalized tonic-clonic seizure lasting longer than 2 minutes, or two consecutive seizures without return to baseline in-between  oxyCODONE    5 mG/acetaminophen 325 mG 1 Tablet(s) Oral every 4 hours PRN Severe Pain (7 - 10)      Valproic Acid Level, Serum: 32.0 ug/mL [50.0 - 100.0] (06-13-18 @ 18:52)        VEEG in the last 24 hours:    Background---------8-9 hz    Focal and generalized slowing-------none    Interictal activity------rare to small number of sharp transients over the left posterior temporal region    Events----complained of HA    Seizures---none    Impression:---- the significance of sharp transients in regard to epileptogenicity is not clear .    Plan - continue the monitoring           drcrease the depakote to 250 hs

## 2018-06-14 NOTE — PROGRESS NOTE ADULT - ASSESSMENT
1. Possible Seizure disorder:   Continue VEEG, first 24 hrs without events of seizure   Continue Depakote dose decreased to 250mg daily.     2. Atrial fibrillation:   Continue with Sotalol and Eliquis.   Continue Eliquis.     3. HTN:   Continue Clonidine, Benazepril (Lisinopril). He refused to take Norvasc.     4. Headache: still with episodes of frequent frontal headache  Continue Tylenol and Percocet. Avoid NSAIDs due to elevated BP.     5. Hyperlipidemia:   On Statin    DVT PPX: On Eliquis.

## 2018-06-15 ENCOUNTER — TRANSCRIPTION ENCOUNTER (OUTPATIENT)
Age: 73
End: 2018-06-15

## 2018-06-15 VITALS
HEART RATE: 62 BPM | TEMPERATURE: 97 F | SYSTOLIC BLOOD PRESSURE: 146 MMHG | DIASTOLIC BLOOD PRESSURE: 70 MMHG | RESPIRATION RATE: 16 BRPM

## 2018-06-15 RX ORDER — DIVALPROEX SODIUM 500 MG/1
1 TABLET, DELAYED RELEASE ORAL
Qty: 30 | Refills: 0
Start: 2018-06-15 | End: 2018-07-14

## 2018-06-15 RX ORDER — LANOLIN ALCOHOL/MO/W.PET/CERES
3 CREAM (GRAM) TOPICAL
Qty: 0 | Refills: 0 | DISCHARGE
Start: 2018-06-15

## 2018-06-15 RX ORDER — SOTALOL HCL 120 MG
1.5 TABLET ORAL
Qty: 0 | Refills: 0 | COMMUNITY

## 2018-06-15 RX ADMIN — APIXABAN 5 MILLIGRAM(S): 2.5 TABLET, FILM COATED ORAL at 06:06

## 2018-06-15 RX ADMIN — Medication 80 MILLIGRAM(S): at 06:06

## 2018-06-15 RX ADMIN — PANTOPRAZOLE SODIUM 40 MILLIGRAM(S): 20 TABLET, DELAYED RELEASE ORAL at 06:06

## 2018-06-15 RX ADMIN — LISINOPRIL 40 MILLIGRAM(S): 2.5 TABLET ORAL at 06:06

## 2018-06-15 NOTE — DISCHARGE NOTE ADULT - MEDICATION SUMMARY - MEDICATIONS TO TAKE
I will START or STAY ON the medications listed below when I get home from the hospital:    benazepril 40 mg oral tablet  -- 1 tab(s) by mouth once a day  -- Indication: For HTN    Catapres 0.1 mg oral tablet  -- 1 tab(s) by mouth once a day (at bedtime)  -- Indication: For HTN    sotalol 80 mg oral tablet  -- 1 tab(s) by mouth 2 times a day  -- Indication: For AFIB    Eliquis 5 mg oral tablet  -- 1 tab(s) by mouth 2 times a day  -- Indication: For AFIB    divalproex sodium 250 mg oral tablet, extended release  -- 1 tab(s) by mouth once a day (at bedtime)  -- Indication: For Seizure    rosuvastatin 10 mg oral tablet  -- 1 tab(s) by mouth once a day (at bedtime)  -- Indication: For Hyperlipidemia    amLODIPine 5 mg oral tablet  -- 1 tab(s) by mouth once a day  -- Indication: For HTN    Melatonin 3 mg oral tablet  -- 3 tab(s) by mouth once a day (at bedtime)  -- Indication: For Insomnia    Protonix 40 mg oral delayed release tablet  -- 1 tab(s) by mouth once a day  -- Indication: For Gastritis

## 2018-06-15 NOTE — DISCHARGE NOTE ADULT - HOSPITAL COURSE
This is 71 yo male with PMH of HTN, AFIB, Hyperlipidemia Gastritis and SCC of scalp came today for schedule VEEG.   Patient was admitted to the [facility] on on 4/30/18 after seizure like episode, symptoms started with severe headache and elevated /100, and at night while he was sleeping he had tonic-clonic movement. He had Brain CT and MRI negative for stroke, EEG was not diagnostic.   Patient started on Depakote 500mg XL.   Also he was c/o of severe frontal headache, his BP was > 200, he visited his PCP, he was given Clonidine and his BP improved.   Patient was admitted for video EEG, he has no evidence of seizure evetns, he had episodes of frontal headache, responded to Tylenol.     1. Possible Seizure disorder:    VEEG, no evidence of seizure.   Continue Depakote dose decreased to 250mg daily.     2. Atrial fibrillation:   Continue with Sotalol and Eliquis.   Continue Eliquis.     3. HTN:   Continue Clonidine, Benazepril . He refused to take Norvasc.     4. Headache:   still with episodes of frequent frontal headache  Continue Tylenol.     5. Hyperlipidemia:   On Statin

## 2018-06-15 NOTE — PROGRESS NOTE ADULT - SUBJECTIVE AND OBJECTIVE BOX
Epilepsy Attending Note:     TRELL ISRAEL    72y Male  MRN [id number]    Vital Signs Last 24 Hrs  T(C): 36.2 (15 Shun 2018 05:44), Max: 36.2 (15 Shun 2018 05:44)  T(F): 97.1 (15 Shun 2018 05:44), Max: 97.1 (15 Shun 2018 05:44)  HR: 62 (15 Shun 2018 05:44) (57 - 62)  BP: 146/70 (15 Shun 2018 05:44) (146/70 - 156/78)  BP(mean): --  RR: 16 (15 Shun 2018 05:44) (16 - 18)  SpO2: --                          11.8   6.10  )-----------( 174      ( 13 Jun 2018 18:52 )             37.9       06-13    142  |  104  |  15  ----------------------------<  84  4.1   |  28  |  0.8    Ca    9.0      13 Jun 2018 18:52  Mg     2.1     06-13    TPro  5.8<L>  /  Alb  3.6  /  TBili  <0.2  /  DBili  x   /  AST  22  /  ALT  17  /  AlkPhos  65  06-13      MEDICATIONS  (STANDING):  apixaban 5 milliGRAM(s) Oral every 12 hours  atorvastatin 20 milliGRAM(s) Oral at bedtime  cloNIDine 0.1 milliGRAM(s) Oral at bedtime  diVALproex  milliGRAM(s) Oral at bedtime  lisinopril 40 milliGRAM(s) Oral daily  melatonin 9 milliGRAM(s) Oral at bedtime  pantoprazole    Tablet 40 milliGRAM(s) Oral before breakfast  sotalol 80 milliGRAM(s) Oral two times a day    MEDICATIONS  (PRN):  LORazepam   Injectable 2 milliGRAM(s) IV Push three times a day PRN generalized tonic-clonic seizure lasting longer than 2 minutes, or two consecutive seizures without return to baseline in-between  oxyCODONE    5 mG/acetaminophen 325 mG 1 Tablet(s) Oral every 4 hours PRN Severe Pain (7 - 10)      Valproic Acid Level, Serum: 32.0 ug/mL [50.0 - 100.0] (06-13-18 @ 18:52)        VEEG in the last 24 hours:    Background--------8-9 hz    Focal and generalized slowing--------none    Interictal activity----none    Events----no clinical or subclinical sz    Seizures----as above    Impression:  normal A/D/S     Plan - DC the monitoring           F/U  in july

## 2018-06-15 NOTE — DISCHARGE NOTE ADULT - CARE PROVIDER_API CALL
Tripp Finley), Neurology  [address] [place] [address]  Phone: [phone]  Fax: [phone]

## 2018-06-15 NOTE — DISCHARGE NOTE ADULT - PATIENT PORTAL LINK FT
You can access the [email or web address], offered by [facility], by registering with the following website: [email or web address]

## 2018-06-15 NOTE — DISCHARGE NOTE ADULT - CARE PLAN
Principal Discharge DX:	Seizure  Goal:	no evidence of seizure activity on EEG  Assessment and plan of treatment:	Depakote 250mg daily, follow up with neurology in Jul 2018  Secondary Diagnosis:	Atrial fibrillation, unspecified type

## 2018-06-19 DIAGNOSIS — Z98.890 OTHER SPECIFIED POSTPROCEDURAL STATES: ICD-10-CM

## 2018-06-19 DIAGNOSIS — Z79.01 LONG TERM (CURRENT) USE OF ANTICOAGULANTS: ICD-10-CM

## 2018-06-19 DIAGNOSIS — E78.5 HYPERLIPIDEMIA, UNSPECIFIED: ICD-10-CM

## 2018-06-19 DIAGNOSIS — I48.91 UNSPECIFIED ATRIAL FIBRILLATION: ICD-10-CM

## 2018-06-19 DIAGNOSIS — R51 HEADACHE: ICD-10-CM

## 2018-06-19 DIAGNOSIS — F43.10 POST-TRAUMATIC STRESS DISORDER, UNSPECIFIED: ICD-10-CM

## 2018-06-19 DIAGNOSIS — R56.9 UNSPECIFIED CONVULSIONS: ICD-10-CM

## 2018-06-19 DIAGNOSIS — Z85.828 PERSONAL HISTORY OF OTHER MALIGNANT NEOPLASM OF SKIN: ICD-10-CM

## 2018-06-19 DIAGNOSIS — I10 ESSENTIAL (PRIMARY) HYPERTENSION: ICD-10-CM

## 2018-07-04 ENCOUNTER — EMERGENCY (EMERGENCY)
Facility: HOSPITAL | Age: 73
LOS: 0 days | Discharge: HOME | End: 2018-07-04
Attending: EMERGENCY MEDICINE | Admitting: EMERGENCY MEDICINE

## 2018-07-04 VITALS
SYSTOLIC BLOOD PRESSURE: 190 MMHG | RESPIRATION RATE: 18 BRPM | DIASTOLIC BLOOD PRESSURE: 93 MMHG | HEART RATE: 86 BPM | OXYGEN SATURATION: 98 % | TEMPERATURE: 98 F

## 2018-07-04 DIAGNOSIS — Z79.01 LONG TERM (CURRENT) USE OF ANTICOAGULANTS: ICD-10-CM

## 2018-07-04 DIAGNOSIS — E78.5 HYPERLIPIDEMIA, UNSPECIFIED: ICD-10-CM

## 2018-07-04 DIAGNOSIS — R51 HEADACHE: ICD-10-CM

## 2018-07-04 DIAGNOSIS — K40.20 BILATERAL INGUINAL HERNIA, WITHOUT OBSTRUCTION OR GANGRENE, NOT SPECIFIED AS RECURRENT: Chronic | ICD-10-CM

## 2018-07-04 DIAGNOSIS — Z85.89 PERSONAL HISTORY OF MALIGNANT NEOPLASM OF OTHER ORGANS AND SYSTEMS: Chronic | ICD-10-CM

## 2018-07-04 DIAGNOSIS — I10 ESSENTIAL (PRIMARY) HYPERTENSION: ICD-10-CM

## 2018-07-04 DIAGNOSIS — Z79.899 OTHER LONG TERM (CURRENT) DRUG THERAPY: ICD-10-CM

## 2018-07-04 LAB
ALBUMIN SERPL ELPH-MCNC: 4.1 G/DL — SIGNIFICANT CHANGE UP (ref 3.5–5.2)
ALP SERPL-CCNC: 86 U/L — SIGNIFICANT CHANGE UP (ref 30–115)
ALT FLD-CCNC: 13 U/L — SIGNIFICANT CHANGE UP (ref 0–41)
ANION GAP SERPL CALC-SCNC: 13 MMOL/L — SIGNIFICANT CHANGE UP (ref 7–14)
APTT BLD: 33.3 SEC — SIGNIFICANT CHANGE UP (ref 27–39.2)
AST SERPL-CCNC: 19 U/L — SIGNIFICANT CHANGE UP (ref 0–41)
BASOPHILS # BLD AUTO: 0.03 K/UL — SIGNIFICANT CHANGE UP (ref 0–0.2)
BASOPHILS NFR BLD AUTO: 0.4 % — SIGNIFICANT CHANGE UP (ref 0–1)
BILIRUB SERPL-MCNC: 0.2 MG/DL — SIGNIFICANT CHANGE UP (ref 0.2–1.2)
BUN SERPL-MCNC: 20 MG/DL — SIGNIFICANT CHANGE UP (ref 10–20)
CALCIUM SERPL-MCNC: 8.9 MG/DL — SIGNIFICANT CHANGE UP (ref 8.5–10.1)
CHLORIDE SERPL-SCNC: 98 MMOL/L — SIGNIFICANT CHANGE UP (ref 98–110)
CK MB CFR SERPL CALC: 2.6 NG/ML — SIGNIFICANT CHANGE UP (ref 0.6–6.3)
CO2 SERPL-SCNC: 26 MMOL/L — SIGNIFICANT CHANGE UP (ref 17–32)
CREAT SERPL-MCNC: 0.8 MG/DL — SIGNIFICANT CHANGE UP (ref 0.7–1.5)
EOSINOPHIL # BLD AUTO: 0.08 K/UL — SIGNIFICANT CHANGE UP (ref 0–0.7)
EOSINOPHIL NFR BLD AUTO: 1 % — SIGNIFICANT CHANGE UP (ref 0–8)
GLUCOSE SERPL-MCNC: 106 MG/DL — HIGH (ref 70–99)
HCT VFR BLD CALC: 36.5 % — LOW (ref 42–52)
HGB BLD-MCNC: 11.8 G/DL — LOW (ref 14–18)
IMM GRANULOCYTES NFR BLD AUTO: 0.4 % — HIGH (ref 0.1–0.3)
INR BLD: 1.22 RATIO — SIGNIFICANT CHANGE UP (ref 0.65–1.3)
LYMPHOCYTES # BLD AUTO: 0.87 K/UL — LOW (ref 1.2–3.4)
LYMPHOCYTES # BLD AUTO: 11.3 % — LOW (ref 20.5–51.1)
MCHC RBC-ENTMCNC: 24.6 PG — LOW (ref 27–31)
MCHC RBC-ENTMCNC: 32.3 G/DL — SIGNIFICANT CHANGE UP (ref 32–37)
MCV RBC AUTO: 76 FL — LOW (ref 80–94)
MONOCYTES # BLD AUTO: 0.58 K/UL — SIGNIFICANT CHANGE UP (ref 0.1–0.6)
MONOCYTES NFR BLD AUTO: 7.6 % — SIGNIFICANT CHANGE UP (ref 1.7–9.3)
NEUTROPHILS # BLD AUTO: 6.09 K/UL — SIGNIFICANT CHANGE UP (ref 1.4–6.5)
NEUTROPHILS NFR BLD AUTO: 79.3 % — HIGH (ref 42.2–75.2)
NRBC # BLD: 0 /100 WBCS — SIGNIFICANT CHANGE UP (ref 0–0)
PLATELET # BLD AUTO: 188 K/UL — SIGNIFICANT CHANGE UP (ref 130–400)
POTASSIUM SERPL-MCNC: 4.4 MMOL/L — SIGNIFICANT CHANGE UP (ref 3.5–5)
POTASSIUM SERPL-SCNC: 4.4 MMOL/L — SIGNIFICANT CHANGE UP (ref 3.5–5)
PROT SERPL-MCNC: 6.6 G/DL — SIGNIFICANT CHANGE UP (ref 6–8)
PROTHROM AB SERPL-ACNC: 13.2 SEC — HIGH (ref 9.95–12.87)
RBC # BLD: 4.8 M/UL — SIGNIFICANT CHANGE UP (ref 4.7–6.1)
RBC # FLD: 15.4 % — HIGH (ref 11.5–14.5)
SODIUM SERPL-SCNC: 137 MMOL/L — SIGNIFICANT CHANGE UP (ref 135–146)
TROPONIN T SERPL-MCNC: <0.01 NG/ML — SIGNIFICANT CHANGE UP
VALPROATE SERPL-MCNC: 16 UG/ML — LOW (ref 50–100)
WBC # BLD: 7.68 K/UL — SIGNIFICANT CHANGE UP (ref 4.8–10.8)
WBC # FLD AUTO: 7.68 K/UL — SIGNIFICANT CHANGE UP (ref 4.8–10.8)

## 2018-07-04 RX ORDER — ACETAMINOPHEN 500 MG
975 TABLET ORAL ONCE
Qty: 0 | Refills: 0 | Status: COMPLETED | OUTPATIENT
Start: 2018-07-04 | End: 2018-07-04

## 2018-07-04 RX ORDER — ALPRAZOLAM 0.25 MG
0.5 TABLET ORAL ONCE
Qty: 0 | Refills: 0 | Status: DISCONTINUED | OUTPATIENT
Start: 2018-07-04 | End: 2018-07-04

## 2018-07-04 RX ORDER — AMLODIPINE BESYLATE 2.5 MG/1
5 TABLET ORAL ONCE
Qty: 0 | Refills: 0 | Status: COMPLETED | OUTPATIENT
Start: 2018-07-04 | End: 2018-07-04

## 2018-07-04 RX ADMIN — Medication 0.5 MILLIGRAM(S): at 22:31

## 2018-07-04 RX ADMIN — Medication 975 MILLIGRAM(S): at 23:28

## 2018-07-04 RX ADMIN — Medication 975 MILLIGRAM(S): at 22:06

## 2018-07-04 RX ADMIN — Medication 0.1 MILLIGRAM(S): at 22:07

## 2018-07-04 RX ADMIN — AMLODIPINE BESYLATE 5 MILLIGRAM(S): 2.5 TABLET ORAL at 22:06

## 2018-07-04 NOTE — ED ADULT NURSE NOTE - OBJECTIVE STATEMENT
C/o frontal headache and increased b/p with feeling of weakness. States his pressure was elevated this afternoon and took a dose of clonidine with improvement. Later this evening pt states headache returned and his pressure was high. Denies chest pain, n/v/d sob.

## 2018-07-05 VITALS — DIASTOLIC BLOOD PRESSURE: 86 MMHG | SYSTOLIC BLOOD PRESSURE: 183 MMHG

## 2018-07-05 NOTE — ED PROVIDER NOTE - PROGRESS NOTE DETAILS
pt feeling much better, HA improved, all results d/w pt and copies given, advised close f/u Neuro Dr. Finley on 7/20 and Cardio Dr. Issa next week as scheduled

## 2018-07-05 NOTE — ED PROVIDER NOTE - OBJECTIVE STATEMENT
72y m h/o AF on elliquis, htn, hl, gastritis, scc of scalp p/w intermitt bifrontal HA x 1d. Accomp by episodes of "fogginess", as described per pt. Msrs is BP frequently every day, noted to be high today while symptomatic. Denies vision changes, cp/sob, f/c, neck pain, nvd, abd pain, FNDs. Did not take his clonidine or amlodipine today. Admits to inconsistent compliance w/all of his currenrt BP meds. Has had h/o similar sx over last two mos, was admitted for same in May w/inpt w/u incl MRI and Neuro c/s. Was started on depakote for poss seizures, but then had admission om 6/15 for VEEG showing no seizure-like activity. Has outpt appts scheduled w/Neuro Dr. Finley on 7/20 and Cardio Dr. Issa this Mon for further eval of his sx.

## 2018-07-05 NOTE — ED PROVIDER NOTE - MEDICAL DECISION MAKING DETAILS
HA, HTN, inconsistent compliance w/BP meds, recent inpatient Neuro w/u - ekg, cxr, labs, ct head, will give skipped BP meds, and reassess

## 2018-07-05 NOTE — ED PROVIDER NOTE - PHYSICAL EXAMINATION
VITAL SIGNS: I have reviewed nursing notes and confirm.  CONSTITUTIONAL: Well-developed; well-nourished; in no acute distress.  SKIN: Skin exam is warm and dry, no acute rash.  HEAD: Normocephalic; atraumatic.  EYES: PERRL, EOM intact; conjunctiva and sclera clear.  ENT: No nasal discharge; airway clear.  NECK: Supple; non tender.  CARD: S1, S2 normal; no murmurs, gallops, or rubs. Regular rhythm.  RESP: No wheezes, rales or rhonchi.  ABD: Normal bowel sounds; soft; non-distended; non-tender; no hepatosplenomegaly.  EXT: Normal ROM. No clubbing, cyanosis or edema.  NEURO: aaox3, cn 2-12 intact bl no nystagmus or facial droop, 5/5 strength x 4 no drift, gross sensation intact, finger-nose nl, gait nl, romberg neg   PSYCH: Cooperative, appropriate.

## 2018-07-05 NOTE — ED PROVIDER NOTE - NS ED ROS FT
Constitutional: No fever, chills, unintended weight loss.  Eyes:  No visual changes, eye pain or discharge.  ENMT:  No hearing changes, pain, no sore throat or runny nose, no difficulty swallowing  Cardiac:  No chest pain, SOB or edema. No chest pain with exertion.  Respiratory:  No cough or respiratory distress. No hemoptysis. No history of asthma or RAD.  GI:  No nausea, vomiting, diarrhea or abdominal pain.  :  No dysuria, frequency or burning.  MS:  No myalgia, muscle weakness, joint pain or back pain.  Neuro:  +headache no focal weakness.  No LOC.  Skin:  No skin rash.   Endocrine: No history of thyroid disease or diabetes.

## 2018-07-19 ENCOUNTER — OUTPATIENT (OUTPATIENT)
Dept: OUTPATIENT SERVICES | Facility: HOSPITAL | Age: 73
LOS: 1 days | Discharge: HOME | End: 2018-07-19

## 2018-07-19 DIAGNOSIS — Z85.89 PERSONAL HISTORY OF MALIGNANT NEOPLASM OF OTHER ORGANS AND SYSTEMS: Chronic | ICD-10-CM

## 2018-07-19 DIAGNOSIS — Z79.899 OTHER LONG TERM (CURRENT) DRUG THERAPY: ICD-10-CM

## 2018-07-19 DIAGNOSIS — K40.20 BILATERAL INGUINAL HERNIA, WITHOUT OBSTRUCTION OR GANGRENE, NOT SPECIFIED AS RECURRENT: Chronic | ICD-10-CM

## 2018-07-19 DIAGNOSIS — G40.909 EPILEPSY, UNSPECIFIED, NOT INTRACTABLE, WITHOUT STATUS EPILEPTICUS: ICD-10-CM

## 2018-07-19 PROBLEM — I10 ESSENTIAL (PRIMARY) HYPERTENSION: Chronic | Status: ACTIVE | Noted: 2018-03-27

## 2018-07-19 PROBLEM — B54 UNSPECIFIED MALARIA: Chronic | Status: ACTIVE | Noted: 2018-04-30

## 2018-07-19 PROBLEM — E78.5 HYPERLIPIDEMIA, UNSPECIFIED: Chronic | Status: ACTIVE | Noted: 2018-04-30

## 2018-07-19 PROBLEM — C44.92 SQUAMOUS CELL CARCINOMA OF SKIN, UNSPECIFIED: Chronic | Status: ACTIVE | Noted: 2018-03-27

## 2018-07-19 PROBLEM — K29.70 GASTRITIS, UNSPECIFIED, WITHOUT BLEEDING: Chronic | Status: ACTIVE | Noted: 2018-04-30

## 2018-07-19 PROBLEM — I48.91 UNSPECIFIED ATRIAL FIBRILLATION: Chronic | Status: ACTIVE | Noted: 2018-03-27

## 2018-07-21 ENCOUNTER — OUTPATIENT (OUTPATIENT)
Dept: OUTPATIENT SERVICES | Facility: HOSPITAL | Age: 73
LOS: 1 days | Discharge: HOME | End: 2018-07-21

## 2018-07-21 DIAGNOSIS — K40.20 BILATERAL INGUINAL HERNIA, WITHOUT OBSTRUCTION OR GANGRENE, NOT SPECIFIED AS RECURRENT: Chronic | ICD-10-CM

## 2018-07-21 DIAGNOSIS — I10 ESSENTIAL (PRIMARY) HYPERTENSION: ICD-10-CM

## 2018-07-21 DIAGNOSIS — Z85.89 PERSONAL HISTORY OF MALIGNANT NEOPLASM OF OTHER ORGANS AND SYSTEMS: Chronic | ICD-10-CM

## 2018-08-24 ENCOUNTER — EMERGENCY (EMERGENCY)
Facility: HOSPITAL | Age: 73
LOS: 1 days | Discharge: HOME | End: 2018-08-24
Attending: EMERGENCY MEDICINE

## 2018-08-24 VITALS
HEART RATE: 76 BPM | RESPIRATION RATE: 18 BRPM | SYSTOLIC BLOOD PRESSURE: 171 MMHG | DIASTOLIC BLOOD PRESSURE: 78 MMHG | OXYGEN SATURATION: 97 % | TEMPERATURE: 97 F

## 2018-08-24 VITALS
HEART RATE: 72 BPM | DIASTOLIC BLOOD PRESSURE: 89 MMHG | SYSTOLIC BLOOD PRESSURE: 191 MMHG | RESPIRATION RATE: 18 BRPM | OXYGEN SATURATION: 99 %

## 2018-08-24 DIAGNOSIS — K40.20 BILATERAL INGUINAL HERNIA, WITHOUT OBSTRUCTION OR GANGRENE, NOT SPECIFIED AS RECURRENT: Chronic | ICD-10-CM

## 2018-08-24 DIAGNOSIS — Z85.89 PERSONAL HISTORY OF MALIGNANT NEOPLASM OF OTHER ORGANS AND SYSTEMS: Chronic | ICD-10-CM

## 2018-08-24 LAB
ALBUMIN SERPL ELPH-MCNC: 4.3 G/DL — SIGNIFICANT CHANGE UP (ref 3.5–5.2)
ALP SERPL-CCNC: 72 U/L — SIGNIFICANT CHANGE UP (ref 30–115)
ALT FLD-CCNC: 15 U/L — SIGNIFICANT CHANGE UP (ref 0–41)
ANION GAP SERPL CALC-SCNC: 14 MMOL/L — SIGNIFICANT CHANGE UP (ref 7–14)
APPEARANCE UR: CLEAR — SIGNIFICANT CHANGE UP
AST SERPL-CCNC: 19 U/L — SIGNIFICANT CHANGE UP (ref 0–41)
BASOPHILS # BLD AUTO: 0.03 K/UL — SIGNIFICANT CHANGE UP (ref 0–0.2)
BASOPHILS NFR BLD AUTO: 0.3 % — SIGNIFICANT CHANGE UP (ref 0–1)
BILIRUB SERPL-MCNC: 0.3 MG/DL — SIGNIFICANT CHANGE UP (ref 0.2–1.2)
BILIRUB UR-MCNC: NEGATIVE — SIGNIFICANT CHANGE UP
BUN SERPL-MCNC: 17 MG/DL — SIGNIFICANT CHANGE UP (ref 10–20)
CALCIUM SERPL-MCNC: 9.4 MG/DL — SIGNIFICANT CHANGE UP (ref 8.5–10.1)
CHLORIDE SERPL-SCNC: 100 MMOL/L — SIGNIFICANT CHANGE UP (ref 98–110)
CO2 SERPL-SCNC: 26 MMOL/L — SIGNIFICANT CHANGE UP (ref 17–32)
COLOR SPEC: YELLOW — SIGNIFICANT CHANGE UP
CREAT SERPL-MCNC: 0.8 MG/DL — SIGNIFICANT CHANGE UP (ref 0.7–1.5)
DIFF PNL FLD: ABNORMAL
EOSINOPHIL # BLD AUTO: 0.06 K/UL — SIGNIFICANT CHANGE UP (ref 0–0.7)
EOSINOPHIL NFR BLD AUTO: 0.7 % — SIGNIFICANT CHANGE UP (ref 0–8)
GAS PNL BLDV: SIGNIFICANT CHANGE UP
GAS PNL BLDV: SIGNIFICANT CHANGE UP
GLUCOSE SERPL-MCNC: 110 MG/DL — HIGH (ref 70–99)
GLUCOSE UR QL: NEGATIVE MG/DL — SIGNIFICANT CHANGE UP
HCT VFR BLD CALC: 37.9 % — LOW (ref 42–52)
HGB BLD-MCNC: 11.6 G/DL — LOW (ref 14–18)
IMM GRANULOCYTES NFR BLD AUTO: 0.5 % — HIGH (ref 0.1–0.3)
KETONES UR-MCNC: NEGATIVE — SIGNIFICANT CHANGE UP
LEUKOCYTE ESTERASE UR-ACNC: NEGATIVE — SIGNIFICANT CHANGE UP
LIDOCAIN IGE QN: 18 U/L — SIGNIFICANT CHANGE UP (ref 7–60)
LYMPHOCYTES # BLD AUTO: 1.02 K/UL — LOW (ref 1.2–3.4)
LYMPHOCYTES # BLD AUTO: 11.1 % — LOW (ref 20.5–51.1)
MCHC RBC-ENTMCNC: 23.6 PG — LOW (ref 27–31)
MCHC RBC-ENTMCNC: 30.6 G/DL — LOW (ref 32–37)
MCV RBC AUTO: 77 FL — LOW (ref 80–94)
MONOCYTES # BLD AUTO: 0.71 K/UL — HIGH (ref 0.1–0.6)
MONOCYTES NFR BLD AUTO: 7.8 % — SIGNIFICANT CHANGE UP (ref 1.7–9.3)
NEUTROPHILS # BLD AUTO: 7.29 K/UL — HIGH (ref 1.4–6.5)
NEUTROPHILS NFR BLD AUTO: 79.6 % — HIGH (ref 42.2–75.2)
NITRITE UR-MCNC: NEGATIVE — SIGNIFICANT CHANGE UP
PH UR: 6.5 — SIGNIFICANT CHANGE UP (ref 5–8)
PLATELET # BLD AUTO: 210 K/UL — SIGNIFICANT CHANGE UP (ref 130–400)
POTASSIUM SERPL-MCNC: 4.5 MMOL/L — SIGNIFICANT CHANGE UP (ref 3.5–5)
POTASSIUM SERPL-SCNC: 4.5 MMOL/L — SIGNIFICANT CHANGE UP (ref 3.5–5)
PROT SERPL-MCNC: 7 G/DL — SIGNIFICANT CHANGE UP (ref 6–8)
PROT UR-MCNC: NEGATIVE MG/DL — SIGNIFICANT CHANGE UP
RBC # BLD: 4.92 M/UL — SIGNIFICANT CHANGE UP (ref 4.7–6.1)
RBC # FLD: 15.6 % — HIGH (ref 11.5–14.5)
SODIUM SERPL-SCNC: 140 MMOL/L — SIGNIFICANT CHANGE UP (ref 135–146)
SP GR SPEC: <=1.005 — SIGNIFICANT CHANGE UP (ref 1.01–1.03)
TROPONIN T SERPL-MCNC: <0.01 NG/ML — SIGNIFICANT CHANGE UP
UROBILINOGEN FLD QL: 0.2 MG/DL — SIGNIFICANT CHANGE UP (ref 0.2–0.2)
WBC # BLD: 9.16 K/UL — SIGNIFICANT CHANGE UP (ref 4.8–10.8)
WBC # FLD AUTO: 9.16 K/UL — SIGNIFICANT CHANGE UP (ref 4.8–10.8)

## 2018-08-24 RX ORDER — SODIUM CHLORIDE 9 MG/ML
1000 INJECTION, SOLUTION INTRAVENOUS ONCE
Qty: 0 | Refills: 0 | Status: COMPLETED | OUTPATIENT
Start: 2018-08-24 | End: 2018-08-24

## 2018-08-24 RX ORDER — FAMOTIDINE 10 MG/ML
20 INJECTION INTRAVENOUS ONCE
Qty: 0 | Refills: 0 | Status: COMPLETED | OUTPATIENT
Start: 2018-08-24 | End: 2018-08-24

## 2018-08-24 RX ADMIN — SODIUM CHLORIDE 1000 MILLILITER(S): 9 INJECTION, SOLUTION INTRAVENOUS at 16:17

## 2018-08-24 RX ADMIN — FAMOTIDINE 20 MILLIGRAM(S): 10 INJECTION INTRAVENOUS at 19:24

## 2018-08-24 NOTE — ED ADULT NURSE NOTE - OBJECTIVE STATEMENT
pt c/o of intermittent dizziness since May. Today, pt checked his BP and it was in the 200s. He brought himself to the ED

## 2018-08-24 NOTE — ED PROVIDER NOTE - PROGRESS NOTE DETAILS
rpeat lactate 0.9, pt has been resting comfortably in nad, abd re exam soft ndnt, toelrated po in ed, repeat neruo exam with no focal deficits. nih 0, pt aware of all imaging, copy of results given, strict return precuations given, report will follow up with pmd, cardiology and gi as discussed. Pt feeling improved. PERRLA, neck full ROM, no nuchal rigidity, CN2-12 grossly intact, no sensory or motor deficits throughout, no ataxia, no drift. Rapid alternating intact. Pt feeling improved, tolerating PO, abdomen soft, non-tender, non-distended, no rebound, no guarding. Pt and family aware of all results, given a copy of all results, comfortable with d/c and f/u outpatient, return precautions given, no further questions or concerns at this time

## 2018-08-24 NOTE — ED PROVIDER NOTE - MEDICAL DECISION MAKING DETAILS
pt aware of all labs and imaging, feeling better, reports no symptoms at this time, understands signs and symptoms to return for, will follow up as this time.

## 2018-08-24 NOTE — ED PROVIDER NOTE - CARE PLAN
Assessment and plan of treatment:	Plan: EKG, CXR, labs, ivf, urine, ct head and abd, pepcid, will continue to monitor and reassess. Principal Discharge DX:	Hypertension  Assessment and plan of treatment:	Plan: EKG, CXR, labs, ivf, urine, ct head and abd, pepcid, will continue to monitor and reassess.  Secondary Diagnosis:	Abdominal pain

## 2018-08-24 NOTE — ED PROVIDER NOTE - NS ED ROS FT
Constitutional:  See HPI.   Eyes:  No visual changes, eye pain or discharge.  ENMT:  No hearing changes, pain, discharge or infections. No neck pain or stiffness.  Cardiac:  No chest pain, SOB or edema. No chest pain with exertion.  Respiratory:  No cough or respiratory distress. No hemoptysis.  :  No dysuria, frequency, hematuria  MS:  No joint pain or back pain.  Neuro:  No LOC. No weakness.    Skin:  No skin rash.

## 2018-08-24 NOTE — ED PROVIDER NOTE - ATTENDING CONTRIBUTION TO CARE
I personally evaluated the patient. I reviewed the Resident’s or Physician Assistant’s note (as assigned above), and agree with the findings and plan except as documented in my note.  A 73 y/o m w/ pmhx of anxiety, afib on eliquis, htn, dld, follows with Dr. Issa cardiology, work up for seizures in past with Dr. Finley - neurology presents for concern of elevated bp associated with head and generalized abd pain with diarrhea since yesterday. pt reports bp today was 201/97 at home, took his 40 mg of benazepril and then clonidine 1mg at 1:15 p.m. as bp was 197 systolic at this time. pt recently saw Dr. Finley ~3/4 weeks ago, had had mri in the past, and had an eeg done at this visit that was negative. denies fever, chills, n/v, cp, sob, pleuritic cp, palpitations, diaphoresis, cough, tinnitus, ear pain, hearing loss, neck pain/stiffness, back pain, photophobia/phonophobia, blurry vision/visual changes,  constipation, melena/brbpr, urinary symptoms, weakness, numbness/tingling, syncope, sick contacts, recent travel or rash. wife reports pt very anxious and wanted him to get checked out.   on exam: wdwn male sitting on stretcher in nad, no rash, no signs of trauma, PERRL, EOM intact, no  nystagmus, mmm, neck supple, no spinous ttp to neck or back, FROM, no palpable shelves or step offs, no meningeal signs, regular rate, radial pulses 2/4 b/l, ctabl w/ breath sounds present b/l, no wheezing or crackles, good air exchange, good respiratory effort, no accessory muscle use, no tachypnea, no stridor, bs present throughout all 4 quadrants, abd soft, nd, epigastric discomfort to palpation, no rebound tenderness or guarding, no cvat, FROM of upper and lower ext, no drift, no calf pain/swelling/erythema, AAOx3. Motor 5/5 and sensation intact throughout upper and lower ext. CN II-XII intact. No facial droop or slurring of speech. (-) Pronator (-) Romberg, no dysmetria w/ ftn or rapid alternating fine movements, heel to shin intact, ambulating with no ataxia or difficulty, but reports dizziness when trying to go stand. NIH O.

## 2018-08-24 NOTE — ED PROVIDER NOTE - OBJECTIVE STATEMENT
71yo M hx HTN DL AFib on Eliquis gastritis SCC scalp recent admission for HAs started on depakote, neg EEG pw dizziness, headache, nausea, diffuse abdominal pain, diarrhea x2d- also took BP at home, high, took extra dose of BP meds, still high, came in- no vision changes, numbness/weakness, chest pain, shortness of breath, dysuria/hematuria, back pain, hx bowel surgeries

## 2018-08-24 NOTE — ED PROVIDER NOTE - PHYSICAL EXAMINATION
Well appearing NAD non toxic. NCAT PERRLA EOMI conjunctiva nml. No nasal discharge. MMM. Neck supple, non tender, full ROM. RRR no MRG +S1S2. CTA b/l. Abd s NT ND +BS. Ext WWP x4, moving all extremities, no edema. 2+ equal pulses throughout. Cooperative, appropriate. CN2-12 grossly intact no sensory or motor deficits throughout, no drift, rapid alternating wnl, no ataxia

## 2018-08-25 LAB
CULTURE RESULTS: NO GROWTH — SIGNIFICANT CHANGE UP
SPECIMEN SOURCE: SIGNIFICANT CHANGE UP

## 2018-08-30 DIAGNOSIS — I48.91 UNSPECIFIED ATRIAL FIBRILLATION: ICD-10-CM

## 2018-08-30 DIAGNOSIS — R10.84 GENERALIZED ABDOMINAL PAIN: ICD-10-CM

## 2018-08-30 DIAGNOSIS — Z79.899 OTHER LONG TERM (CURRENT) DRUG THERAPY: ICD-10-CM

## 2018-08-30 DIAGNOSIS — E78.5 HYPERLIPIDEMIA, UNSPECIFIED: ICD-10-CM

## 2018-08-30 DIAGNOSIS — I10 ESSENTIAL (PRIMARY) HYPERTENSION: ICD-10-CM

## 2018-08-30 DIAGNOSIS — R42 DIZZINESS AND GIDDINESS: ICD-10-CM

## 2018-08-30 DIAGNOSIS — R56.9 UNSPECIFIED CONVULSIONS: ICD-10-CM

## 2018-08-30 DIAGNOSIS — Z79.01 LONG TERM (CURRENT) USE OF ANTICOAGULANTS: ICD-10-CM

## 2019-03-26 NOTE — PROGRESS NOTE ADULT - ASSESSMENT
Medical Assistant/Nurse notes reviewed and accepted.    CHIEF COMPLAINT: Skin Assessment     HISTORY OF PRESENT ILLNESS: Dillon Pineda is a 61 year old White male who returns for Skin Assessment    Personal history of actinic keratoses and a BCC on back treated with ED+C 9/18. His father also had a history of 3 melanomas. No other known family history of melanoma or pancreatic cancer.    Patient admits that he is frequently scratching at bumps on his arms and shoulders, occasionally on the face. Often scratches things off. This can scab up. No spontaneously bleeding or scabbing locations.    Patient denies any other skin problems. Has not noted new/changing moles or symptomatic lesions.    MEDICATIONS:  Current Outpatient Medications   Medication Sig Dispense Refill   • magnesium oxide (MAG-OX) 400 MG tablet Take 1 tablet by mouth daily.     • Ascorbic Acid (VITAMIN C) 1000 MG tablet Take 1 tablet by mouth daily.     • zaleplon (SONATA) 10 MG capsule Take 1 capsule by mouth nightly. 24 capsule 1   • glimepiride (AMARYL) 2 MG tablet Take 2 tablets by mouth daily (before breakfast). 180 tablet 3   • Melatonin 1 MG Tab Take 1 tablet by mouth daily. Before bedtime 30 tablet 4   •  MG tablet TAKE 1 TABLET EVERY 6 HOURS AS NEEDED FOR PAIN 180 tablet 3   • omeprazole (PRILOSEC) 20 MG capsule TAKE 1 CAPSULE TWICE A  capsule 1   • metformin (GLUCOPHAGE-XR) 500 MG 24 hr tablet TAKE 3 TABLETS DAILY 270 tablet 1   • losartan-hydrochlorothiazide (HYZAAR) 100-12.5 MG per tablet Take 1 tablet by mouth daily. 90 tablet 3   • ciclopirox olamine (LOPROX) 0.77 % cream Apply to feet, including in between toes, twice daily for 3-4 weeks 30 g 3   • Blood Glucose Monitoring Suppl (ONE TOUCH ULTRA 2) w/Device Kit As directed 1 kit 0   • tiZANidine (ZANAFLEX) 4 MG tablet Take 1 tablet by mouth nightly as needed (backpain or spasms). 30 tablet 2   • HYDROcodone-acetaminophen (NORCO) 5-325 MG per tablet Take 1 tablet by mouth  every 6 hours as needed for Pain. 24 tablet 0   • Cinnamon 500 MG Cap Take 250 mg by mouth 2 times daily.      • aspirin 81 MG tablet Take 1 tablet by mouth daily.     • cholecalciferol (VITAMIN D3) 1000 UNITS tablet Take 2,000 Units by mouth 2 times daily.      • Omega-3 Fatty Acids (FISH OIL) 1200 MG capsule Take 1,200 mg by mouth 2 times daily.     • Multiple Vitamin (ONE-A-DAY MENS PO) Take 1 tablet by mouth daily.        No current facility-administered medications for this visit.      ALLERGIES:  ALLERGIES:  Statins    PHYSICAL EXAM:  There were no vitals taken for this visit.  Well appearing in no acute distress  Skin: Examination of the scalp/body hair, face, neck, ears, eyelids/conjunctiva, lips/oral mucosa, chest, back, abdomen, right upper extremity, left upper extremity, right lower extremity, left lower extremity, digits/nails and genitals/buttocks was performed.   See below    PLAN:    Actinic keratosis - Gritty, angulated papules on the left temple, left lateral forehead.  Discussed diagnosis and relation to chronic sun exposure. Potential treatment options were discussed. A decision was made to treat 2 lesions, as listed above, with cryotherapy x10 sec x2. Tolerated well. Encouraged monitoring of these sites, returning promptly if resolution is not achieved. Routine sun protection/avoidance recommended.    CRYOTHERAPY PROCEDURE NOTE    Verbal informed consent was obtained after discussing the indications, risks (including pain, blister formation, infection, hypo/hyperpigmentation, recurrence, and scar), benefits, and alternative treatments. Cryotherapy was performed w/10-30 second freeze-thaw cycles x2 applied to 2 lesions on the locations described above. The patient tolerated the procedure well without complications.    Solar lentigo >>> Lentigo maligna - On the right preauricular cheek, there is a 7x3 mm light brown patches with regular pigmentation and feathery borders with hints of peppering in  A/P  1-seizure like activity  seen by neuro  recommended EEG and MRI  MRI done negative for acute abnormality  EEG shows generalize slowing  will talk to Neuro if he need any Antiepileptic drugs    2- Hyponatremia  improving   continue with free water restriction  seen by Nephrology    3- HTN improving on ACE and Norvasc  continue same for home      4- A fib  HR controlled  Continue A/C .     Discussed with resident taking care of the Pt today    Discussed with family at bedside    Discharge home today after Neuro recommendations    med rec discussed with Resident inferior lesion. . No rhomboid figures or multiple colors.  Discussed potential diagnosis. Since the suspicion for lentigo maligna is very low and this is a reliable patient for follow up, will recheck at next skin exam. If changes, will pursue biopsy at that time. Encouraged pt to return sooner if changes noted prior to that visit.    History of BCC - Well-healed scar on the left lower back. No evidence of recurrence or lesions warranting biopsy at today's visit. Encouraged routine sun protection/avoidance. Routine self-skin exams were recommended, returning promptly should new concerns arise.    Seborrheic keratoses - 'Stuck on' brown papule(s) on the back.  Benign diagnosis discussed; reassurance provided. Offered to treat via cosmetic charge but patient declined.      Dysplastic nevus - There is one 3x2mm light brown macule with scalloped borders dark pigmentation centrally which fades towards the periphery on the left flank. Dermatoscopic exam shows a regular pigmented network and monotonous color. This appears unchanged from photos. WIll compare again at next visit. Educated patient on the ABCD's of melanoma and 'ugly duckling' rule. Encouraged routine self-skin exam, returning promptly if new concerns arise.         Fhx of melanoma - Patient has a strong family history of melanoma. Only ~10% of cases of melanoma are inherited but the involvement of multiple family members warrants close attention. Encouraged patient to continue to monitor own moles at home and return promptly for any changes or new nevi.    Return in about 1 year (around 3/26/2020) for total skin exam.    On 3/26/2019, Josh Saxena scribed the services personally performed by Jeana Jerome MD   I, Dr. Jeana Jerome, attest that I have reviewed the scribe's note and edited as appropriate. I also personally performed the history of present illness, clinical impressions and plan.

## 2019-04-28 ENCOUNTER — TRANSCRIPTION ENCOUNTER (OUTPATIENT)
Age: 74
End: 2019-04-28

## 2019-05-29 PROBLEM — Z00.00 ENCOUNTER FOR PREVENTIVE HEALTH EXAMINATION: Status: ACTIVE | Noted: 2019-05-29

## 2019-06-05 NOTE — ASU PREOP CHECKLIST - BP NONINVASIVE SYSTOLIC (MM HG)
Spoke to patient and advised her to d/c macrobid and that a new script has been sent over. Patient reports that she is now having some vaginal itching/irritation as she normally does while taking antibiotics. She request a script be sent to pharmacy along with new antibiotic. 134

## 2019-06-14 NOTE — PROGRESS NOTE ADULT - PROVIDER SPECIALTY LIST ADULT
Internal Medicine
Neurology
Principal Discharge DX:	Finger fracture, right  Secondary Diagnosis:	Laceration of finger with tendon involvement

## 2019-07-18 ENCOUNTER — APPOINTMENT (OUTPATIENT)
Dept: GASTROENTEROLOGY | Facility: CLINIC | Age: 74
End: 2019-07-18
Payer: MEDICARE

## 2019-07-18 VITALS
DIASTOLIC BLOOD PRESSURE: 80 MMHG | BODY MASS INDEX: 30.61 KG/M2 | HEART RATE: 80 BPM | SYSTOLIC BLOOD PRESSURE: 130 MMHG | WEIGHT: 226 LBS | HEIGHT: 72 IN

## 2019-07-18 DIAGNOSIS — Z86.010 PERSONAL HISTORY OF COLONIC POLYPS: ICD-10-CM

## 2019-07-18 DIAGNOSIS — Z86.79 PERSONAL HISTORY OF OTHER DISEASES OF THE CIRCULATORY SYSTEM: ICD-10-CM

## 2019-07-18 DIAGNOSIS — Z78.9 OTHER SPECIFIED HEALTH STATUS: ICD-10-CM

## 2019-07-18 DIAGNOSIS — Z86.39 PERSONAL HISTORY OF OTHER ENDOCRINE, NUTRITIONAL AND METABOLIC DISEASE: ICD-10-CM

## 2019-07-18 PROCEDURE — 99204 OFFICE O/P NEW MOD 45 MIN: CPT

## 2019-07-18 NOTE — ASSESSMENT
[FreeTextEntry1] : 73 year old male pt with atrial fibrillation on eliquis, HTN, new onset LUISITO, no gross GI bleeding \par s/p EGD, colonoscopy by Dr Ash: EGD unremarkable (2019), colonoscopy (2018): HP, diverticulosis, hemorrhoids.\par No alarm signs\par Referred for capsule endoscopy.\par Risks and benefits discussed with patient.\par

## 2019-07-18 NOTE — REASON FOR VISIT
[Initial Evaluation] : an initial evaluation [FreeTextEntry1] : Low hemoglobin , LUISITO s/p EGD, colonoscopy

## 2019-07-18 NOTE — HISTORY OF PRESENT ILLNESS
[de-identified] : 73 year old male pt with atrial fibrillation on eliquis, HTN, new onset LUISIOT, no gross GI bleeding \par s/p EGD, colonoscopy by Dr Ash: EGD unremarkable (2019), colonoscopy (2018): HP, diverticulosis, hemorrhoids.\par Referred for capsule endoscopy.

## 2019-07-18 NOTE — PHYSICAL EXAM
[General Appearance - Alert] : alert [Sclera] : the sclera and conjunctiva were normal [Neck Appearance] : the appearance of the neck was normal [Auscultation Breath Sounds / Voice Sounds] : lungs were clear to auscultation bilaterally [Heart Sounds] : normal S1 and S2 [Bowel Sounds] : normal bowel sounds [Abdomen Soft] : soft [Abdomen Tenderness] : non-tender [] : no hepato-splenomegaly [Abdomen Mass (___ Cm)] : no abdominal mass palpated [No CVA Tenderness] : no ~M costovertebral angle tenderness [Abnormal Walk] : normal gait [Skin Color & Pigmentation] : normal skin color and pigmentation [Oriented To Time, Place, And Person] : oriented to person, place, and time

## 2019-07-30 ENCOUNTER — TRANSCRIPTION ENCOUNTER (OUTPATIENT)
Age: 74
End: 2019-07-30

## 2019-07-31 ENCOUNTER — OUTPATIENT (OUTPATIENT)
Dept: OUTPATIENT SERVICES | Facility: HOSPITAL | Age: 74
LOS: 1 days | Discharge: HOME | End: 2019-07-31
Payer: MEDICARE

## 2019-07-31 VITALS
WEIGHT: 220.02 LBS | RESPIRATION RATE: 20 BRPM | HEIGHT: 72 IN | HEART RATE: 66 BPM | DIASTOLIC BLOOD PRESSURE: 70 MMHG | SYSTOLIC BLOOD PRESSURE: 135 MMHG | TEMPERATURE: 97 F

## 2019-07-31 DIAGNOSIS — Z85.89 PERSONAL HISTORY OF MALIGNANT NEOPLASM OF OTHER ORGANS AND SYSTEMS: Chronic | ICD-10-CM

## 2019-07-31 DIAGNOSIS — K40.20 BILATERAL INGUINAL HERNIA, WITHOUT OBSTRUCTION OR GANGRENE, NOT SPECIFIED AS RECURRENT: Chronic | ICD-10-CM

## 2019-07-31 PROCEDURE — 91110 GI TRC IMG INTRAL ESOPH-ILE: CPT | Mod: 26

## 2019-07-31 NOTE — ASU DISCHARGE PLAN (ADULT/PEDIATRIC) - CALL YOUR DOCTOR IF YOU HAVE ANY OF THE FOLLOWING:
Nausea and vomiting that does not stop/Increased irritability or sluggishness/Inability to tolerate liquids or foods/Excessive diarrhea/Numbness, tingling, color or temperature change to extremity/Fever greater than (need to indicate Fahrenheit or Celsius)/Bleeding that does not stop/Pain not relieved by Medications

## 2019-07-31 NOTE — ASU DISCHARGE PLAN (ADULT/PEDIATRIC) - CARE PROVIDER_API CALL
Monie Arrington)  Internal Medicine  [address] [place] [address]  Phone: [phone]  Fax: [phone]  Follow Up Time:

## 2019-08-01 ENCOUNTER — APPOINTMENT (OUTPATIENT)
Dept: NEUROLOGY | Facility: CLINIC | Age: 74
End: 2019-08-01
Payer: MEDICARE

## 2019-08-01 VITALS
BODY MASS INDEX: 29.8 KG/M2 | DIASTOLIC BLOOD PRESSURE: 79 MMHG | WEIGHT: 220 LBS | HEART RATE: 80 BPM | OXYGEN SATURATION: 97 % | TEMPERATURE: 98.6 F | HEIGHT: 72 IN | SYSTOLIC BLOOD PRESSURE: 135 MMHG

## 2019-08-01 DIAGNOSIS — Z86.59 PERSONAL HISTORY OF OTHER MENTAL AND BEHAVIORAL DISORDERS: ICD-10-CM

## 2019-08-01 DIAGNOSIS — R42 DIZZINESS AND GIDDINESS: ICD-10-CM

## 2019-08-01 PROCEDURE — 99214 OFFICE O/P EST MOD 30 MIN: CPT

## 2019-08-01 RX ORDER — PSYLLIUM HUSK 0.4 G
CAPSULE ORAL
Refills: 0 | Status: DISCONTINUED | COMMUNITY
End: 2019-08-01

## 2019-08-01 RX ORDER — POLYETHYLENE GLYCOL 3350, SODIUM SULFATE, SODIUM CHLORIDE, POTASSIUM CHLORIDE, ASCORBIC ACID, SODIUM ASCORBATE 7.5-2.691G
100 KIT ORAL
Qty: 1 | Refills: 0 | Status: DISCONTINUED | COMMUNITY
Start: 2019-07-18 | End: 2019-08-01

## 2019-08-01 NOTE — ASSESSMENT
[FreeTextEntry1] : Elfego is here for followup diagnosis off anxiety disorder and depression and also episodes of panic attack and vertigo. He did have one episode that appears to be Seizure and  secondary to hypertensive crisis or hyponatremia overall he is doing very well in that regard we will continue the current level of care and I will see him in followup with best wishes

## 2019-08-01 NOTE — PHYSICAL EXAM
[FreeTextEntry1] : He is at his baseline showing a slightly flat affect and occasionally is slightly tearful especially when he talks to friends that he lost those who he went to war with.\par His mental status exam otherwise is normal\par The cranial nerve exam including the Navarro is normal he is a slightly having difficulty with hearing in both eyes and he is sparing the hearing aid\par There is no nystagmus no dysmetria his gait is stable he is able to walk tandem.\par The Romberg is negative

## 2019-08-01 NOTE — HISTORY OF PRESENT ILLNESS
[FreeTextEntry1] : Elfego is here for followup. He does have history of hypertensive crisis and also past medical history of anxiety disorder and posttraumatic stress disorder. The main symptoms that brought him to our attention where episodic events that he would see slightly overwhelmed having a sense of rushing sensation involving the lower body often maintaining his level of consciousness and a good memory of the event although he has been described also to be confused during. He did have one episode that appeared to be either hypertensive crisis causing a partial seizure or a hyponatremia causing his seizure. He had monitored him and it is my general opinion that he does not have epilepsy he had kept him on a small dose of Depakote considering his mood as well. He continues to have episodes that are not quite stereotypical. Since last visit he did have 2 events that he describes as feeling very tired having a sense of flushing and dysesthesia and at the same time dysarthria or difficulty with speaking or hypophonia. Having these he usually stays in bed and he has a good  memory of the events .his wife occasionally reports some degree of confusion I am not sure whether it is really a confusion or being overwhelmed and panicky. He did not pass out. Aside from this he did have periods of vertigo while he was in [place] that clearly is positional in nature. He continues to have his issues with his mood often described as being impulsive or showing OCD. Constantly consumed by his son's autistic features and also seizure. Is not socializing to [place] and he reports having bad memories occasionally and bad dreams.

## 2019-08-10 DIAGNOSIS — D64.9 ANEMIA, UNSPECIFIED: ICD-10-CM

## 2019-10-06 ENCOUNTER — TRANSCRIPTION ENCOUNTER (OUTPATIENT)
Age: 74
End: 2019-10-06

## 2019-10-28 ENCOUNTER — APPOINTMENT (OUTPATIENT)
Dept: GASTROENTEROLOGY | Facility: CLINIC | Age: 74
End: 2019-10-28

## 2019-11-14 ENCOUNTER — APPOINTMENT (OUTPATIENT)
Dept: GASTROENTEROLOGY | Facility: CLINIC | Age: 74
End: 2019-11-14
Payer: MEDICARE

## 2019-11-14 VITALS — WEIGHT: 237 LBS | BODY MASS INDEX: 32.1 KG/M2 | HEIGHT: 72 IN

## 2019-11-14 VITALS — DIASTOLIC BLOOD PRESSURE: 90 MMHG | SYSTOLIC BLOOD PRESSURE: 162 MMHG

## 2019-11-14 DIAGNOSIS — Z80.0 FAMILY HISTORY OF MALIGNANT NEOPLASM OF DIGESTIVE ORGANS: ICD-10-CM

## 2019-11-14 PROCEDURE — 99214 OFFICE O/P EST MOD 30 MIN: CPT

## 2019-11-14 NOTE — REASON FOR VISIT
[Follow-Up: _____] : a [unfilled] follow-up visit [FreeTextEntry1] : Capsule results 07/31/2019, worsening anemia

## 2019-11-14 NOTE — PHYSICAL EXAM
[Sclera] : the sclera and conjunctiva were normal [General Appearance - Alert] : alert [Auscultation Breath Sounds / Voice Sounds] : lungs were clear to auscultation bilaterally [Thyroid Diffuse Enlargement] : the thyroid was not enlarged [Bowel Sounds] : normal bowel sounds [Heart Sounds] : normal S1 and S2 [Abdomen Soft] : soft [Abdomen Tenderness] : non-tender [Abdomen Mass (___ Cm)] : no abdominal mass palpated [] : no hepato-splenomegaly [No CVA Tenderness] : no ~M costovertebral angle tenderness [Abnormal Walk] : normal gait [Oriented To Time, Place, And Person] : oriented to person, place, and time [Skin Color & Pigmentation] : normal skin color and pigmentation

## 2019-11-14 NOTE — HISTORY OF PRESENT ILLNESS
[FreeTextEntry1] : 74 year old male pt with LUISITO , atrial fibrillation on eliquis & HTN Here for capsule endoscopy results which was unremarkable except for fair prep.\par Currently his Hg is trending down on eliquis\par No gross GI bleeding\par

## 2019-11-14 NOTE — ASSESSMENT
[FreeTextEntry1] : 74 year old male pt with LUISITO , atrial fibrillation on eliquis & HTN Here for capsule endoscopy results which was unremarkable except for fair prep.\par Currently his Hg is trending down on eliquis\par No gross GI bleeding\par \par Rec: EGD, colonoscopy\par Risks and benefits discussed with patient.\par

## 2019-11-19 ENCOUNTER — OUTPATIENT (OUTPATIENT)
Dept: OUTPATIENT SERVICES | Facility: HOSPITAL | Age: 74
LOS: 1 days | Discharge: HOME | End: 2019-11-19
Payer: MEDICARE

## 2019-11-19 ENCOUNTER — RESULT REVIEW (OUTPATIENT)
Age: 74
End: 2019-11-19

## 2019-11-19 ENCOUNTER — TRANSCRIPTION ENCOUNTER (OUTPATIENT)
Age: 74
End: 2019-11-19

## 2019-11-19 VITALS — RESPIRATION RATE: 17 BRPM | SYSTOLIC BLOOD PRESSURE: 126 MMHG | DIASTOLIC BLOOD PRESSURE: 72 MMHG | HEART RATE: 83 BPM

## 2019-11-19 VITALS
OXYGEN SATURATION: 96 % | TEMPERATURE: 96 F | HEART RATE: 67 BPM | DIASTOLIC BLOOD PRESSURE: 73 MMHG | SYSTOLIC BLOOD PRESSURE: 118 MMHG | WEIGHT: 220.02 LBS | RESPIRATION RATE: 18 BRPM

## 2019-11-19 DIAGNOSIS — K40.20 BILATERAL INGUINAL HERNIA, WITHOUT OBSTRUCTION OR GANGRENE, NOT SPECIFIED AS RECURRENT: Chronic | ICD-10-CM

## 2019-11-19 DIAGNOSIS — Z85.89 PERSONAL HISTORY OF MALIGNANT NEOPLASM OF OTHER ORGANS AND SYSTEMS: Chronic | ICD-10-CM

## 2019-11-19 PROCEDURE — 88305 TISSUE EXAM BY PATHOLOGIST: CPT | Mod: 26

## 2019-11-19 PROCEDURE — 45380 COLONOSCOPY AND BIOPSY: CPT

## 2019-11-19 PROCEDURE — 88312 SPECIAL STAINS GROUP 1: CPT | Mod: 26

## 2019-11-19 PROCEDURE — 43239 EGD BIOPSY SINGLE/MULTIPLE: CPT | Mod: XS

## 2019-11-19 RX ORDER — PANTOPRAZOLE SODIUM 20 MG/1
1 TABLET, DELAYED RELEASE ORAL
Qty: 0 | Refills: 0 | DISCHARGE

## 2019-11-19 NOTE — ASU DISCHARGE PLAN (ADULT/PEDIATRIC) - CARE PROVIDER_API CALL
Monie Arrington)  Internal Medicine  [address] [place] [address]  Phone: [phone]  Fax: [phone]  Follow Up Time:

## 2019-11-21 LAB — SURGICAL PATHOLOGY STUDY: SIGNIFICANT CHANGE UP

## 2019-11-25 ENCOUNTER — RX RENEWAL (OUTPATIENT)
Age: 74
End: 2019-11-25

## 2019-11-25 DIAGNOSIS — K29.50 UNSPECIFIED CHRONIC GASTRITIS WITHOUT BLEEDING: ICD-10-CM

## 2019-11-25 DIAGNOSIS — D50.0 IRON DEFICIENCY ANEMIA SECONDARY TO BLOOD LOSS (CHRONIC): ICD-10-CM

## 2019-11-25 DIAGNOSIS — K29.80 DUODENITIS WITHOUT BLEEDING: ICD-10-CM

## 2019-11-25 DIAGNOSIS — K22.70 BARRETT'S ESOPHAGUS WITHOUT DYSPLASIA: ICD-10-CM

## 2019-12-31 ENCOUNTER — OUTPATIENT (OUTPATIENT)
Dept: OUTPATIENT SERVICES | Facility: HOSPITAL | Age: 74
LOS: 1 days | Discharge: HOME | End: 2019-12-31

## 2019-12-31 DIAGNOSIS — N39.0 URINARY TRACT INFECTION, SITE NOT SPECIFIED: ICD-10-CM

## 2019-12-31 DIAGNOSIS — Z85.89 PERSONAL HISTORY OF MALIGNANT NEOPLASM OF OTHER ORGANS AND SYSTEMS: Chronic | ICD-10-CM

## 2019-12-31 DIAGNOSIS — N40.1 BENIGN PROSTATIC HYPERPLASIA WITH LOWER URINARY TRACT SYMPTOMS: ICD-10-CM

## 2019-12-31 DIAGNOSIS — K40.20 BILATERAL INGUINAL HERNIA, WITHOUT OBSTRUCTION OR GANGRENE, NOT SPECIFIED AS RECURRENT: Chronic | ICD-10-CM

## 2019-12-31 DIAGNOSIS — E29.1 TESTICULAR HYPOFUNCTION: ICD-10-CM

## 2020-01-22 NOTE — ASU PATIENT PROFILE, ADULT - FALL HARM RISK CONCLUSION
Body Location Override (Optional - Billing Will Still Be Based On Selected Body Map Location If Applicable): Patient is healing well. Detail Level: Detailed Universal Safety Interventions

## 2020-02-06 ENCOUNTER — APPOINTMENT (OUTPATIENT)
Dept: NEUROLOGY | Facility: CLINIC | Age: 75
End: 2020-02-06

## 2020-02-20 ENCOUNTER — OUTPATIENT (OUTPATIENT)
Dept: OUTPATIENT SERVICES | Facility: HOSPITAL | Age: 75
LOS: 1 days | Discharge: HOME | End: 2020-02-20
Payer: MEDICARE

## 2020-02-20 VITALS
RESPIRATION RATE: 18 BRPM | HEART RATE: 56 BPM | WEIGHT: 220.02 LBS | SYSTOLIC BLOOD PRESSURE: 127 MMHG | HEIGHT: 72 IN | TEMPERATURE: 98 F | DIASTOLIC BLOOD PRESSURE: 69 MMHG

## 2020-02-20 DIAGNOSIS — D50.9 IRON DEFICIENCY ANEMIA, UNSPECIFIED: ICD-10-CM

## 2020-02-20 DIAGNOSIS — Z85.89 PERSONAL HISTORY OF MALIGNANT NEOPLASM OF OTHER ORGANS AND SYSTEMS: Chronic | ICD-10-CM

## 2020-02-20 DIAGNOSIS — K40.20 BILATERAL INGUINAL HERNIA, WITHOUT OBSTRUCTION OR GANGRENE, NOT SPECIFIED AS RECURRENT: Chronic | ICD-10-CM

## 2020-02-20 PROCEDURE — 91110 GI TRC IMG INTRAL ESOPH-ILE: CPT | Mod: 26

## 2020-02-20 NOTE — H&P PST ADULT - NSICDXPASTMEDICALHX_GEN_ALL_CORE_FT
PAST MEDICAL HISTORY:  Afib     Dyslipidemia     Gastritis     HTN (hypertension)     Malaria     Squamous cell carcinoma scalp

## 2020-02-20 NOTE — ASU DISCHARGE PLAN (ADULT/PEDIATRIC) - CARE PROVIDER_API CALL
Monie Arrington)  Internal Medicine  [address] [place] [address]  Phone: [phone]  Fax: [phone]  Follow Up Time:

## 2020-02-20 NOTE — ASU DISCHARGE PLAN (ADULT/PEDIATRIC) - FOLLOW UP APPOINTMENTS
911 or go to the nearest Emergency Room [facility]:  Endoscopy/Ambulatory Surgery [place]...

## 2020-02-20 NOTE — H&P PST ADULT - NSICDXPASTSURGICALHX_GEN_ALL_CORE_FT
PAST SURGICAL HISTORY:  Hernia, inguinal, bilateral     History of squamous cell carcinoma scalp , moh's procedure

## 2020-02-20 NOTE — ASU PREOP CHECKLIST - PATIENT SENT TO
Continue lisinopril 30mg daily    Increase dose of wellbutrin to 300mg daily     Cologuard ordered     Medicare Wellness Visit, Female Done Today     The best way to live healthy is to have a lifestyle where you eat a well-balanced diet, exercise regularly, limit alcohol use, and quit all forms of tobacco/nicotine, if applicable. Regular preventive services are another way to keep healthy. Preventive services (vaccines, screening tests, monitoring & exams) can help personalize your care plan, which helps you manage your own care. Screening tests can find health problems at the earliest stages, when they are easiest to treat. [facility] follows the current, evidence-based guidelines published by the [facility] ([facility]) when recommending preventive services for our patients. Because we follow these guidelines, sometimes recommendations change over time as research supports it. (For example, mammograms used to be recommended annually. Even though Medicare will still pay for an annual mammogram, the newer guidelines recommend a mammogram every two years for women of average risk.)    Of course, you and your provider may decide to screen more often for some diseases, based on your risk and co-morbidities (chronic disease you are already diagnosed with). Preventive services for you include:    - Medicare offers their members a free annual wellness visit, which is time for you and your primary care provider to discuss and plan for your preventive service needs. Take advantage of this benefit every year!    -All people over age 72 should receive the recommended pneumonia vaccines. Current USPSTF guidelines recommend a series of two vaccines for the best pneumonia protection.     -All adults should have a yearly flu vaccine and a tetanus vaccine every 10 years.  All adults age 61 years should receive a shingles vaccine once in their lifetime.      -A bone mass density test is recommended when a woman turns 65 to screen for osteoporosis. This test is only recommended once as a screening. Some providers will use this same test as a disease monitoring tool if you already have osteoporosis. -All adults age 38-68 years who are overweight should have a diabetes screening test once every three years.     -Other screening tests & preventive services for persons with diabetes include: an eye exam to screen for diabetic retinopathy, a kidney function test, a foot exam, and stricter control over your cholesterol.     -Cardiovascular screening for adults with routine risk involves an electrocardiogram (ECG) at intervals determined by the provider.     -Colorectal cancer screenings should be done for adults age 54-65 years with normal risk. There are a number of acceptable methods of screening for this type of cancer. Each test has its own benefits and drawbacks. Discuss with your provider what is most appropriate for you during your annual wellness visit. The different tests include: colonoscopy (considered the best screening method), a fecal occult blood test, a fecal DNA test, and sigmoidoscopy. -Breast cancer screenings are recommended every other year for women of normal risk age 54-69 years.     -Cervical cancer screenings for women over age 72 are only recommended with certain risk factors.     -All adults born between [facility] should be screened once for Hepatitis C. Here is a list of your current Health Maintenance items (your personalized list of preventive services) with a due date:  Health Maintenance Due   Topic Date Due    Cologuard screening for colon cancer 06/28/2000        Shoulder Stretches: Exercises  Your Care Instructions  Here are some examples of exercises for your shoulder. Start each exercise slowly. Ease off the exercise if you start to have pain.   Your doctor or physical therapist will tell you when you can start these exercises and which ones will work best for you. How to do the exercises  Shoulder stretch    1.  a doorway and place one arm against the door frame. Your elbow should be a little higher than your shoulder. 2. Relax your shoulders as you lean forward, allowing your chest and shoulder muscles to stretch. You can also turn your body slightly away from your arm to stretch the muscles even more. 3. Hold for 15 to 30 seconds. 4. Repeat 2 to 4 times with each arm. Shoulder and chest stretch    1. Shoulder and chest stretch  2. While sitting, relax your upper body so you slump slightly in your chair. 3. As you breathe in, straighten your back and open your arms out to the sides. 4. Gently pull your shoulder blades back and downward. 5. Hold for 15 to 30 seconds as your breathe normally. 6. Repeat 2 to 4 times. Overhead stretch    1. Reach up over your head with both arms. 2. Hold for 15 to 30 seconds. 3. Repeat 2 to 4 times. Follow-up care is a key part of your treatment and safety. Be sure to make and go to all appointments, and call your doctor if you are having problems. It's also a good idea to know your test results and keep a list of the medicines you take. Where can you learn more? Go to http://eusebia-chuck.info/. Enter S254 in the search box to learn more about \"Shoulder Stretches: Exercises. \"  Current as of: March 21, 2017  Content Version: 11.4  © 1574-3890 [employer]. Care instructions adapted under license by [employer] (which disclaims liability or warranty for this information). If you have questions about a medical condition or this instruction, always ask your healthcare professional. [place] [address] any warranty or liability for your use of this information. Rotator Cuff: Exercises  Your Care Instructions  Here are some examples of typical rehabilitation exercises for your condition. Start each exercise slowly.  Ease off the exercise if you start to have pain. Your doctor or physical therapist will tell you when you can start these exercises and which ones will work best for you. How to do the exercises  Pendulum swing    If you have pain in your back, do not do this exercise. 5. Hold on to a table or the back of a chair with your good arm. Then bend forward a little and let your sore arm hang straight down. This exercise does not use the arm muscles. Rather, use your legs and your hips to create movement that makes your arm swing freely. 6. Use the movement from your hips and legs to guide the slightly swinging arm back and forth like a pendulum (or elephant trunk). Then guide it in circles that start small (about the size of a dinner plate). Make the circles a bit larger each day, as your pain allows. 7. Do this exercise for 5 minutes, 5 to 7 times each day. 8. As you have less pain, try bending over a little farther to do this exercise. This will increase the amount of movement at your shoulder. Posterior stretching exercise    7. Hold the elbow of your injured arm with your other hand. 8. Use your hand to pull your injured arm gently up and across your body. You will feel a gentle stretch across the back of your injured shoulder. 9. Hold for at least 15 to 30 seconds. Then slowly lower your arm. 10. Repeat 2 to 4 times. Up-the-back stretch    Your doctor or physical therapist may want you to wait to do this stretch until you have regained most of your range of motion and strength. You can do this stretch in different ways. Hold any of these stretches for at least 15 to 30 seconds. Repeat them 2 to 4 times. 4. Put your hand in your back pocket. Let it rest there to stretch your shoulder. 5. With your other hand, hold your injured arm (palm outward) behind your back by the wrist. Pull your arm up gently to stretch your shoulder. 6. Next, put a towel over your other shoulder. Put the hand of your injured arm behind your back.  Now hold the back end of the towel. With the other hand, hold the front end of the towel in front of your body. Pull gently on the front end of the towel. This will bring your hand farther up your back to stretch your shoulder. Overhead stretch    1. Standing about an arm's length away, grasp onto a solid surface. You could use a countertop, a doorknob, or the back of a sturdy chair. 2. With your knees slightly bent, bend forward with your arms straight. Lower your upper body, and let your shoulders stretch. 3. As your shoulders are able to stretch farther, you may need to take a step or two backward. 4. Hold for at least 15 to 30 seconds. Then stand up and relax. If you had stepped back during your stretch, step forward so you can keep your hands on the solid surface. 5. Repeat 2 to 4 times. Shoulder flexion (lying down)    To make a wand for this exercise, use a piece of PVC pipe or a broom handle with the broom removed. Make the wand about a foot wider than your shoulders. 1. Lie on your back, holding a wand with both hands. Your palms should face down as you hold the wand. 2. Keeping your elbows straight, slowly raise your arms over your head. Raise them until you feel a stretch in your shoulders, upper back, and chest.  3. Hold for 15 to 30 seconds. 4. Repeat 2 to 4 times. Shoulder rotation (lying down)    To make a wand for this exercise, use a piece of PVC pipe or a broom handle with the broom removed. Make the wand about a foot wider than your shoulders. 1. Lie on your back. Hold a wand with both hands with your elbows bent and palms up. 2. Keep your elbows close to your body, and move the wand across your body toward the sore arm. 3. Hold for 8 to 12 seconds. 4. Repeat 2 to 4 times. Wall climbing (to the side)    Avoid any movement that is straight to your side, and be careful not to arch your back. Your arm should stay about 30 degrees to the front of your side.   1. Stand with your side to a wall so that your fingers can just touch it at an angle about 30 degrees toward the front of your body. 2. Walk the fingers of your injured arm up the wall as high as pain permits. Try not to shrug your shoulder up toward your ear as you move your arm up. 3. Hold that position for a count of at least 15 to 20.  4. Walk your fingers back down to the starting position. 5. Repeat at least 2 to 4 times. Try to reach higher each time. Wall climbing (to the front)    During this stretching exercise, be careful not to arch your back. 1. Face a wall, and stand so your fingers can just touch it. 2. Keeping your shoulder down, walk the fingers of your injured arm up the wall as high as pain permits. (Don't shrug your shoulder up toward your ear.)  3. Hold your arm in that position for at least 15 to 30 seconds. 4. Slowly walk your fingers back down to where you started. 5. Repeat at least 2 to 4 times. Try to reach higher each time. Shoulder blade squeeze    1. Stand with your arms at your sides, and squeeze your shoulder blades together. Do not raise your shoulders up as you squeeze. 2. Hold 6 seconds. 3. Repeat 8 to 12 times. Scapular exercise: Arm reach    1. Lie flat on your back. This exercise is a very slight motion that starts with your arms raised (elbows straight, arms straight). 2. From this position, reach higher toward the eleil or ceiling. Keep your elbows straight. All motion should be from your shoulder blade only. 3. Relax your arms back to where you started. 4. Repeat 8 to 12 times. Arm raise to the side    During this strengthening exercise, your arm should stay about 30 degrees to the front of your side. 1. Slowly raise your injured arm to the side, with your thumb facing up. Raise your arm 60 degrees at the most (shoulder level is 90 degrees). 2. Hold the position for 3 to 5 seconds. Then lower your arm back to your side.  If you need to, bring your \"good\" arm across your body and place it under the elbow as you lower your injured arm. Use your good arm to keep your injured arm from dropping down too fast.  3. Repeat 8 to 12 times. 4. When you first start out, don't hold any extra weight in your hand. As you get stronger, you may use a 1-pound to 2-pound dumbbell or a small can of food. Shoulder flexor and extensor exercise    These are isometric exercises. That means you contract your muscles without actually moving. 1. Push forward (flex): Stand facing a wall or doorjamb, about 6 inches or less back. Hold your injured arm against your body. Make a closed fist with your thumb on top. Then gently push your hand forward into the wall with about 25% to 50% of your strength. Don't let your body move backward as you push. Hold for about 6 seconds. Relax for a few seconds. Repeat 8 to 12 times. 2. Push backward (extend): Stand with your back flat against a wall. Your upper arm should be against the wall, with your elbow bent 90 degrees (your hand straight ahead). Push your elbow gently back against the wall with about 25% to 50% of your strength. Don't let your body move forward as you push. Hold for about 6 seconds. Relax for a few seconds. Repeat 8 to 12 times. Scapular exercise: Wall push-ups    This exercise is best done with your fingers somewhat turned out, rather than straight up and down. 1. Stand facing a wall, about 12 inches to 18 inches away. 2. Place your hands on the wall at shoulder height. 3. Slowly bend your elbows and bring your face to the wall. Keep your back and hips straight. 4. Push back to where you started. 5. Repeat 8 to 12 times. 6. When you can do this exercise against a wall comfortably, you can try it against a counter. You can then slowly progress to the end of a couch, then to a sturdy chair, and finally to the floor. Scapular exercise: Retraction    For this exercise, you will need elastic exercise material, such as surgical tubing or Thera-Band.   1. Put the band around a solid object at about waist level. (A bedpost will work well.) Each hand should hold an end of the band. 2. With your elbows at your sides and bent to 90 degrees, pull the band back. Your shoulder blades should move toward each other. Then move your arms back where you started. 3. Repeat 8 to 12 times. 4. If you have good range of motion in your shoulders, try this exercise with your arms lifted out to the sides. Keep your elbows at a 90-degree angle. Raise the elastic band up to about shoulder level. Pull the band back to move your shoulder blades toward each other. Then move your arms back where you started. Internal rotator strengthening exercise    1. Start by tying a piece of elastic exercise material to a doorknob. You can use surgical tubing or Thera-Band. 2. Stand or sit with your shoulder relaxed and your elbow bent 90 degrees. Your upper arm should rest comfortably against your side. Squeeze a rolled towel between your elbow and your body for comfort. This will help keep your arm at your side. 3. Hold one end of the elastic band in the hand of the painful arm. 4. Slowly rotate your forearm toward your body until it touches your belly. Slowly move it back to where you started. 5. Keep your elbow and upper arm firmly tucked against the towel roll or at your side. 6. Repeat 8 to 12 times. External rotator strengthening exercise    1. Start by tying a piece of elastic exercise material to a doorknob. You can use surgical tubing or Thera-Band. (You may also hold one end of the band in each hand.)  2. Stand or sit with your shoulder relaxed and your elbow bent 90 degrees. Your upper arm should rest comfortably against your side. Squeeze a rolled towel between your elbow and your body for comfort. This will help keep your arm at your side. 3. Hold one end of the elastic band with the hand of the painful arm. 4. Start with your forearm across your belly.  Slowly rotate the forearm out away from your body. Keep your elbow and upper arm tucked against the towel roll or the side of your body until you begin to feel tightness in your shoulder. Slowly move your arm back to where you started. 5. Repeat 8 to 12 times. Follow-up care is a key part of your treatment and safety. Be sure to make and go to all appointments, and call your doctor if you are having problems. It's also a good idea to know your test results and keep a list of the medicines you take. Where can you learn more? Go to http://eusebia-chuck.info/. Enter Tara Dow in the search box to learn more about \"Rotator Cuff: Exercises. \"  Current as of: March 21, 2017  Content Version: 11.4  © 3553-0801 [employer]. Care instructions adapted under license by [employer] (which disclaims liability or warranty for this information). If you have questions about a medical condition or this instruction, always ask your healthcare professional. [address] any warranty or liability for your use of this information. endoscopy

## 2020-03-03 ENCOUNTER — TRANSCRIPTION ENCOUNTER (OUTPATIENT)
Age: 75
End: 2020-03-03

## 2020-03-11 ENCOUNTER — TRANSCRIPTION ENCOUNTER (OUTPATIENT)
Age: 75
End: 2020-03-11

## 2020-04-09 ENCOUNTER — APPOINTMENT (OUTPATIENT)
Dept: UROLOGY | Facility: CLINIC | Age: 75
End: 2020-04-09
Payer: MEDICARE

## 2020-04-09 VITALS
TEMPERATURE: 98 F | WEIGHT: 220 LBS | HEIGHT: 72 IN | HEART RATE: 64 BPM | BODY MASS INDEX: 29.8 KG/M2 | DIASTOLIC BLOOD PRESSURE: 80 MMHG | SYSTOLIC BLOOD PRESSURE: 142 MMHG

## 2020-04-09 PROCEDURE — 99203 OFFICE O/P NEW LOW 30 MIN: CPT

## 2020-04-09 NOTE — HISTORY OF PRESENT ILLNESS
[FreeTextEntry1] : 75 yo male referred for microscopic hematuria\par no evidence of microhematuria noted on repeat UA over 3 years\par last UA with RPCs was in 2017 - 3-6 / hpf\par \par US from outside reviewed\par no upper tract pathology \par \par IPSS 0\par IIEF 15

## 2020-04-09 NOTE — ASSESSMENT
[FreeTextEntry1] : 75 yo without clear evidence of microhematuria on UA in approximately 3 years\par discussed recommendation\par 1 - yearly UA and PSA\par return for assessment if UA shows RBCs\par \par all questions answered\par \par

## 2020-04-09 NOTE — LETTER BODY
[Dear  ___] : Dear ~PREMA, [Consult Letter:] : I had the pleasure of evaluating your patient, [unfilled]. [Please see my note below.] : Please see my note below. [Sincerely,] : Sincerely, [FreeTextEntry3] : Sung Mai MD, FACS\par

## 2020-04-09 NOTE — PHYSICAL EXAM
[General Appearance - Well Developed] : well developed [General Appearance - Well Nourished] : well nourished [Normal Appearance] : normal appearance [Well Groomed] : well groomed [General Appearance - In No Acute Distress] : no acute distress [Edema] : no peripheral edema [Respiration, Rhythm And Depth] : normal respiratory rhythm and effort [Exaggerated Use Of Accessory Muscles For Inspiration] : no accessory muscle use [Abdomen Soft] : soft [Abdomen Tenderness] : non-tender [Costovertebral Angle Tenderness] : no ~M costovertebral angle tenderness [Testes Mass (___cm)] : there were no testicular masses [Normal Station and Gait] : the gait and station were normal for the patient's age [] : no rash [No Focal Deficits] : no focal deficits [Oriented To Time, Place, And Person] : oriented to person, place, and time [Affect] : the affect was normal [Mood] : the mood was normal [Not Anxious] : not anxious [No Palpable Adenopathy] : no palpable adenopathy

## 2020-04-28 ENCOUNTER — APPOINTMENT (OUTPATIENT)
Dept: NEUROLOGY | Facility: CLINIC | Age: 75
End: 2020-04-28
Payer: MEDICARE

## 2020-04-28 PROCEDURE — 99441: CPT

## 2020-04-28 PROCEDURE — 99446 NTRPROF PH1/NTRNET/EHR 5-10: CPT

## 2020-05-22 ENCOUNTER — LABORATORY RESULT (OUTPATIENT)
Age: 75
End: 2020-05-22

## 2020-05-28 ENCOUNTER — APPOINTMENT (OUTPATIENT)
Dept: GASTROENTEROLOGY | Facility: CLINIC | Age: 75
End: 2020-05-28
Payer: MEDICARE

## 2020-05-28 PROCEDURE — 99214 OFFICE O/P EST MOD 30 MIN: CPT | Mod: 95

## 2020-05-28 RX ORDER — POLYETHYLENE GLYCOL 3350 AND ELECTROLYTES WITH LEMON FLAVOR 236; 22.74; 6.74; 5.86; 2.97 G/4L; G/4L; G/4L; G/4L; G/4L
236 POWDER, FOR SOLUTION ORAL
Qty: 1 | Refills: 0 | Status: DISCONTINUED | COMMUNITY
Start: 2019-11-25 | End: 2020-05-28

## 2020-05-28 RX ORDER — ROSUVASTATIN CALCIUM 10 MG/1
10 TABLET, FILM COATED ORAL
Refills: 0 | Status: DISCONTINUED | COMMUNITY
End: 2020-05-28

## 2020-05-28 RX ORDER — POLYETHYLENE GLYCOL 3350, SODIUM SULFATE, SODIUM CHLORIDE, POTASSIUM CHLORIDE, ASCORBIC ACID, SODIUM ASCORBATE 7.5-2.691G
100 KIT ORAL
Qty: 1 | Refills: 0 | Status: DISCONTINUED | COMMUNITY
Start: 2019-11-14 | End: 2020-05-28

## 2020-05-28 NOTE — HISTORY OF PRESENT ILLNESS
[Home] : at home, [unfilled] , at the time of the visit. [Medical Office: ([facility] [place])___] : at the medical office located in  [Verbal consent obtained from patient] : the patient, [unfilled] [FreeTextEntry4] : Antonia Canales [de-identified] : 73 year old male pt with atrial fibrillation on eliquis, HTN, Hx of LUISITO, no gross GI bleeding \par s/p EGD, colonoscopy by Dr Ash: EGD unremarkable (2019), colonoscopy (2018): HP, diverticulosis, hemorrhoids.\par Had repeat EGD, colonoscopy and a capsule endoscopy with us.\par No gross GI bleeding, weight loss or any alarm signs\par Now following w repeat labs.

## 2020-05-28 NOTE — ASSESSMENT
[FreeTextEntry1] : 73 year old male pt with atrial fibrillation on eliquis, HTN, Hx of LUISITO, no gross GI bleeding \par s/p EGD, colonoscopy by Dr Ash: EGD unremarkable (2019), colonoscopy (2018): HP, diverticulosis, hemorrhoids.\par Had repeat EGD, colonoscopy and a capsule endoscopy with us.\par No gross GI bleeding, weight loss or any alarm signs\par Now following w repeat labs. \par \par EGD: 12/2019: WNL\par Colono 2019: diverticulosis\par Capsule: 1 jejunal non bleeding ectasia\par \par Currently Hg is 12.9 off iron supplementation.\par \par \par Rec: watchful waiting \par Repeat CBC in 6 months at PMD\par RTC PRN

## 2020-05-28 NOTE — PHYSICAL EXAM
[General Appearance - Alert] : alert [Sclera] : the sclera and conjunctiva were normal [Hearing Threshold Finger Rub Not [place]] : hearing was normal [Skin Color & Pigmentation] : normal skin color and pigmentation [Oriented To Time, Place, And Person] : oriented to person, place, and time

## 2020-08-06 ENCOUNTER — APPOINTMENT (OUTPATIENT)
Dept: SURGERY | Facility: CLINIC | Age: 75
End: 2020-08-06
Payer: MEDICARE

## 2020-08-06 VITALS — WEIGHT: 220 LBS | HEIGHT: 72 IN | BODY MASS INDEX: 29.8 KG/M2

## 2020-08-06 PROCEDURE — 99203 OFFICE O/P NEW LOW 30 MIN: CPT

## 2020-08-06 NOTE — CONSULT LETTER
[Dear  ___] : Dear  [unfilled], [Courtesy Letter:] : I had the pleasure of seeing your patient, [unfilled], in my office today. [Please see my note below.] : Please see my note below. [Consult Closing:] : Thank you very much for allowing me to participate in the care of this patient.  If you have any questions, please do not hesitate to contact me. [DrMarielle  ___] : Dr. FRENCH [DrMarielle ___] : Dr. FRENCH [FreeTextEntry3] : Respectfully,\par \par Darin Madrigal M.D., FACS\par

## 2020-08-06 NOTE — PHYSICAL EXAM
[No Rash or Lesion] : No rash or lesion [Normal Breath Sounds] : Normal breath sounds [Alert] : alert [Calm] : calm [JVD] : no jugular venous distention  [de-identified] : normal [de-identified] : healthy [de-identified] : mildly protuberant abdomen, mild diastasis recti\par \par  [de-identified] : normal testicles [de-identified] : no hernia recurrence in both groins; tender incarcerated umbilical hernia

## 2020-08-06 NOTE — ASSESSMENT
[FreeTextEntry1] : Elfego is a pleasant 74-year-old retired gentleman with a past medical history significant for hypertension, hypercholesterolemia, atrial fibrillation on Eliquis, GERD, anxiety and panic attacks, depression, PTSD and one seizure in the past likely secondary to a hypertensive crisis or hyponatremia who presents with concerns about chronic discomfort in the left groin. He had a laparoscopic bilateral inguinal hernia repair with mesh by Dr. Lester in 2017 followed by a recurrent left inguinal hernia repair with mesh also by Dr. Lester in 2018. He was concerned about the possibility of another recurrence in light of his symptoms. He occasionally does heavy lifting and strenuous activity around the house.\par \par Physical examination demonstrates well healed scars with no evidence of hernia recurrence or delayed wound complications in the left groin. He does have a significant amount of scar tissue and palpable mesh deep within the inguinal canal. I believe his symptoms are related to chronic strain and scar tissue. Both testicles are normal. His umbilical examination demonstrates a large strawberry size very tender irreducible bulge consistent with an incarcerated umbilical hernia (and possibly an incisional hernia related to his previous laparoscopic inguinal hernia surgery trocar site) warranting surgical repair.\par \par I explained the pros and cons of surgery, as well as all risks, benefits, indications and alternatives of the procedure and the patient understood and agreed. Elfego was scheduled for the repair of his incarcerated umbilical hernia with mesh on Monday, August 31, 2020 under LOCAL with IV SED[facility] at [facility] with presurgical testing preceding this date. He was encouraged to avoid heavy lifting and strenuous activity in the interim, of course. He will hold his Eliquis 2 days prior to surgery and may resume it immediately thereafter.

## 2020-08-17 ENCOUNTER — RESULT REVIEW (OUTPATIENT)
Age: 75
End: 2020-08-17

## 2020-08-17 ENCOUNTER — OUTPATIENT (OUTPATIENT)
Dept: OUTPATIENT SERVICES | Facility: HOSPITAL | Age: 75
LOS: 1 days | Discharge: HOME | End: 2020-08-17
Payer: MEDICARE

## 2020-08-17 VITALS
SYSTOLIC BLOOD PRESSURE: 166 MMHG | DIASTOLIC BLOOD PRESSURE: 82 MMHG | HEART RATE: 62 BPM | OXYGEN SATURATION: 96 % | WEIGHT: 223.11 LBS | RESPIRATION RATE: 16 BRPM | HEIGHT: 72 IN | TEMPERATURE: 98 F

## 2020-08-17 DIAGNOSIS — K42.0 UMBILICAL HERNIA WITH OBSTRUCTION, WITHOUT GANGRENE: ICD-10-CM

## 2020-08-17 DIAGNOSIS — Z01.818 ENCOUNTER FOR OTHER PREPROCEDURAL EXAMINATION: ICD-10-CM

## 2020-08-17 DIAGNOSIS — Z85.89 PERSONAL HISTORY OF MALIGNANT NEOPLASM OF OTHER ORGANS AND SYSTEMS: Chronic | ICD-10-CM

## 2020-08-17 DIAGNOSIS — K40.20 BILATERAL INGUINAL HERNIA, WITHOUT OBSTRUCTION OR GANGRENE, NOT SPECIFIED AS RECURRENT: Chronic | ICD-10-CM

## 2020-08-17 LAB
ALBUMIN SERPL ELPH-MCNC: 4.7 G/DL — SIGNIFICANT CHANGE UP (ref 3.5–5.2)
ALP SERPL-CCNC: 69 U/L — SIGNIFICANT CHANGE UP (ref 30–115)
ALT FLD-CCNC: 16 U/L — SIGNIFICANT CHANGE UP (ref 0–41)
ANION GAP SERPL CALC-SCNC: 12 MMOL/L — SIGNIFICANT CHANGE UP (ref 7–14)
APPEARANCE UR: CLEAR — SIGNIFICANT CHANGE UP
APTT BLD: 30.8 SEC — SIGNIFICANT CHANGE UP (ref 27–39.2)
AST SERPL-CCNC: 22 U/L — SIGNIFICANT CHANGE UP (ref 0–41)
BACTERIA # UR AUTO: NEGATIVE — SIGNIFICANT CHANGE UP
BASOPHILS # BLD AUTO: 0.02 K/UL — SIGNIFICANT CHANGE UP (ref 0–0.2)
BASOPHILS NFR BLD AUTO: 0.2 % — SIGNIFICANT CHANGE UP (ref 0–1)
BILIRUB SERPL-MCNC: 0.3 MG/DL — SIGNIFICANT CHANGE UP (ref 0.2–1.2)
BILIRUB UR-MCNC: NEGATIVE — SIGNIFICANT CHANGE UP
BUN SERPL-MCNC: 17 MG/DL — SIGNIFICANT CHANGE UP (ref 10–20)
CALCIUM SERPL-MCNC: 9.6 MG/DL — SIGNIFICANT CHANGE UP (ref 8.5–10.1)
CHLORIDE SERPL-SCNC: 101 MMOL/L — SIGNIFICANT CHANGE UP (ref 98–110)
CO2 SERPL-SCNC: 27 MMOL/L — SIGNIFICANT CHANGE UP (ref 17–32)
COLOR SPEC: YELLOW — SIGNIFICANT CHANGE UP
CREAT SERPL-MCNC: 1.1 MG/DL — SIGNIFICANT CHANGE UP (ref 0.7–1.5)
DIFF PNL FLD: ABNORMAL
EOSINOPHIL # BLD AUTO: 0.13 K/UL — SIGNIFICANT CHANGE UP (ref 0–0.7)
EOSINOPHIL NFR BLD AUTO: 1.4 % — SIGNIFICANT CHANGE UP (ref 0–8)
EPI CELLS # UR: 0 /HPF — SIGNIFICANT CHANGE UP (ref 0–5)
GLUCOSE SERPL-MCNC: 96 MG/DL — SIGNIFICANT CHANGE UP (ref 70–99)
GLUCOSE UR QL: NEGATIVE — SIGNIFICANT CHANGE UP
HCT VFR BLD CALC: 44.8 % — SIGNIFICANT CHANGE UP (ref 42–52)
HGB BLD-MCNC: 13.5 G/DL — LOW (ref 14–18)
HYALINE CASTS # UR AUTO: 0 /LPF — SIGNIFICANT CHANGE UP (ref 0–7)
IMM GRANULOCYTES NFR BLD AUTO: 0.4 % — HIGH (ref 0.1–0.3)
INR BLD: 1.11 RATIO — SIGNIFICANT CHANGE UP (ref 0.65–1.3)
KETONES UR-MCNC: NEGATIVE — SIGNIFICANT CHANGE UP
LEUKOCYTE ESTERASE UR-ACNC: NEGATIVE — SIGNIFICANT CHANGE UP
LYMPHOCYTES # BLD AUTO: 1.53 K/UL — SIGNIFICANT CHANGE UP (ref 1.2–3.4)
LYMPHOCYTES # BLD AUTO: 16.2 % — LOW (ref 20.5–51.1)
MCHC RBC-ENTMCNC: 23.8 PG — LOW (ref 27–31)
MCHC RBC-ENTMCNC: 30.1 G/DL — LOW (ref 32–37)
MCV RBC AUTO: 79 FL — LOW (ref 80–94)
MONOCYTES # BLD AUTO: 0.86 K/UL — HIGH (ref 0.1–0.6)
MONOCYTES NFR BLD AUTO: 9.1 % — SIGNIFICANT CHANGE UP (ref 1.7–9.3)
NEUTROPHILS # BLD AUTO: 6.86 K/UL — HIGH (ref 1.4–6.5)
NEUTROPHILS NFR BLD AUTO: 72.7 % — SIGNIFICANT CHANGE UP (ref 42.2–75.2)
NITRITE UR-MCNC: NEGATIVE — SIGNIFICANT CHANGE UP
NRBC # BLD: 0 /100 WBCS — SIGNIFICANT CHANGE UP (ref 0–0)
PH UR: 6 — SIGNIFICANT CHANGE UP (ref 5–8)
PLATELET # BLD AUTO: 237 K/UL — SIGNIFICANT CHANGE UP (ref 130–400)
POTASSIUM SERPL-MCNC: 4.5 MMOL/L — SIGNIFICANT CHANGE UP (ref 3.5–5)
POTASSIUM SERPL-SCNC: 4.5 MMOL/L — SIGNIFICANT CHANGE UP (ref 3.5–5)
PROT SERPL-MCNC: 7.7 G/DL — SIGNIFICANT CHANGE UP (ref 6–8)
PROT UR-MCNC: SIGNIFICANT CHANGE UP
PROTHROM AB SERPL-ACNC: 12.8 SEC — SIGNIFICANT CHANGE UP (ref 9.95–12.87)
RBC # BLD: 5.67 M/UL — SIGNIFICANT CHANGE UP (ref 4.7–6.1)
RBC # FLD: 14.8 % — HIGH (ref 11.5–14.5)
RBC CASTS # UR COMP ASSIST: 6 /HPF — HIGH (ref 0–4)
SODIUM SERPL-SCNC: 140 MMOL/L — SIGNIFICANT CHANGE UP (ref 135–146)
SP GR SPEC: 1.02 — SIGNIFICANT CHANGE UP (ref 1.01–1.02)
UROBILINOGEN FLD QL: SIGNIFICANT CHANGE UP
WBC # BLD: 9.44 K/UL — SIGNIFICANT CHANGE UP (ref 4.8–10.8)
WBC # FLD AUTO: 9.44 K/UL — SIGNIFICANT CHANGE UP (ref 4.8–10.8)
WBC UR QL: 1 /HPF — SIGNIFICANT CHANGE UP (ref 0–5)

## 2020-08-17 PROCEDURE — 93010 ELECTROCARDIOGRAM REPORT: CPT

## 2020-08-17 PROCEDURE — 71046 X-RAY EXAM CHEST 2 VIEWS: CPT | Mod: 26

## 2020-08-17 RX ORDER — VENLAFAXINE HCL 75 MG
1 CAPSULE, EXT RELEASE 24 HR ORAL
Qty: 0 | Refills: 0 | DISCHARGE

## 2020-08-17 RX ORDER — ROSUVASTATIN CALCIUM 5 MG/1
1 TABLET ORAL
Qty: 0 | Refills: 0 | DISCHARGE

## 2020-08-17 NOTE — H&P PST ADULT - NSANTHOSAYNRD_GEN_A_CORE
No. CAREN screening performed.  STOP BANG Legend: 0-2 = LOW Risk; 3-4 = INTERMEDIATE Risk; 5-8 = HIGH Risk

## 2020-08-17 NOTE — H&P PST ADULT - REASON FOR ADMISSION
75 yo male presents for PAST in preparation for incarcerated umbilical hernia repair with mesh on 8/31/2020.

## 2020-08-17 NOTE — H&P PST ADULT - HISTORY OF PRESENT ILLNESS
Pt complains of abdominal pain. Denies any chest pain, difficulty breathing, SOB, palpitations, dysuria, URI, or any other infections in the last 2 weeks. Denies any recent travel, contact, or exposure to any persons with known or suspected COVID-19. Pt also denies COVID testing within the last 2 weeks. Pt advised to self quarantine until day of procedure. Exercise tolerance of 2 flights of stairs without dyspnea. CAREN reviewed with patient.     Anesthesia Alert  NO--Difficult Airway  NO--History of neck surgery or radiation  NO--Limited ROM of neck  NO--History of Malignant hyperthermia  NO--No personal or family history of Pseudocholinesterase deficiency.  NO--Prior Anesthesia Complication  NO--Latex Allergy  NO--Loose teeth  NO--History of Rheumatoid Arthritis  NO--CAREN  NO--Other_____

## 2020-08-28 ENCOUNTER — OUTPATIENT (OUTPATIENT)
Dept: OUTPATIENT SERVICES | Facility: HOSPITAL | Age: 75
LOS: 1 days | Discharge: HOME | End: 2020-08-28

## 2020-08-28 ENCOUNTER — LABORATORY RESULT (OUTPATIENT)
Age: 75
End: 2020-08-28

## 2020-08-28 DIAGNOSIS — K40.20 BILATERAL INGUINAL HERNIA, WITHOUT OBSTRUCTION OR GANGRENE, NOT SPECIFIED AS RECURRENT: Chronic | ICD-10-CM

## 2020-08-28 DIAGNOSIS — Z85.89 PERSONAL HISTORY OF MALIGNANT NEOPLASM OF OTHER ORGANS AND SYSTEMS: Chronic | ICD-10-CM

## 2020-08-28 DIAGNOSIS — Z11.59 ENCOUNTER FOR SCREENING FOR OTHER VIRAL DISEASES: ICD-10-CM

## 2020-08-31 ENCOUNTER — APPOINTMENT (OUTPATIENT)
Dept: SURGERY | Facility: AMBULATORY SURGERY CENTER | Age: 75
End: 2020-08-31
Payer: MEDICARE

## 2020-08-31 ENCOUNTER — OUTPATIENT (OUTPATIENT)
Dept: OUTPATIENT SERVICES | Facility: HOSPITAL | Age: 75
LOS: 1 days | Discharge: HOME | End: 2020-08-31

## 2020-08-31 VITALS
OXYGEN SATURATION: 96 % | RESPIRATION RATE: 16 BRPM | DIASTOLIC BLOOD PRESSURE: 72 MMHG | HEART RATE: 57 BPM | SYSTOLIC BLOOD PRESSURE: 153 MMHG

## 2020-08-31 VITALS
HEIGHT: 72 IN | SYSTOLIC BLOOD PRESSURE: 125 MMHG | OXYGEN SATURATION: 95 % | TEMPERATURE: 98 F | HEART RATE: 61 BPM | RESPIRATION RATE: 20 BRPM | DIASTOLIC BLOOD PRESSURE: 61 MMHG | WEIGHT: 223.11 LBS

## 2020-08-31 DIAGNOSIS — Z85.89 PERSONAL HISTORY OF MALIGNANT NEOPLASM OF OTHER ORGANS AND SYSTEMS: Chronic | ICD-10-CM

## 2020-08-31 DIAGNOSIS — K40.20 BILATERAL INGUINAL HERNIA, WITHOUT OBSTRUCTION OR GANGRENE, NOT SPECIFIED AS RECURRENT: Chronic | ICD-10-CM

## 2020-08-31 PROCEDURE — 49561: CPT

## 2020-08-31 PROCEDURE — 49568: CPT

## 2020-08-31 RX ORDER — HYDROMORPHONE HYDROCHLORIDE 2 MG/ML
0.5 INJECTION INTRAMUSCULAR; INTRAVENOUS; SUBCUTANEOUS
Refills: 0 | Status: DISCONTINUED | OUTPATIENT
Start: 2020-08-31 | End: 2020-08-31

## 2020-08-31 RX ORDER — TRAMADOL HYDROCHLORIDE 50 MG/1
1 TABLET ORAL
Qty: 30 | Refills: 0
Start: 2020-08-31 | End: 2020-09-04

## 2020-08-31 RX ORDER — SOTALOL HCL 120 MG
1 TABLET ORAL
Qty: 0 | Refills: 0 | DISCHARGE

## 2020-08-31 RX ORDER — ONDANSETRON 8 MG/1
4 TABLET, FILM COATED ORAL ONCE
Refills: 0 | Status: DISCONTINUED | OUTPATIENT
Start: 2020-08-31 | End: 2020-09-14

## 2020-08-31 RX ORDER — SODIUM CHLORIDE 9 MG/ML
1000 INJECTION, SOLUTION INTRAVENOUS
Refills: 0 | Status: DISCONTINUED | OUTPATIENT
Start: 2020-08-31 | End: 2020-09-14

## 2020-08-31 RX ADMIN — SODIUM CHLORIDE 100 MILLILITER(S): 9 INJECTION, SOLUTION INTRAVENOUS at 10:07

## 2020-08-31 NOTE — ASU DISCHARGE PLAN (ADULT/PEDIATRIC) - ASU DC SPECIAL INSTRUCTIONSFT
Diet    Eat light on the day of surgery. Nausea and vomiting can occur after anesthesia,   but usually resolve within 24 hours.  Resume normal diet the following day.      Activity    Rest!  No heavy lifting or strenuous activity.    Medications    Ibuprofen (Advil, Motrin), Naprosyn (Aleve) or Extra-Strength Tylenol for pain.  Tramadol for severe pain only.  Your prescription was sent electronically to your pharmacy.  Remember, Tramadol is a strong narcotic-like pain reliever which can cause drowsiness, upset stomach and constipation.  It should always be taken with food.  You can use stool softener (Mineral Oil) or laxative (MiraLax or Dulcolax) if constipated.  An antibiotic is given during surgery.  No antibiotic needed at home.   Resume all previous medications.  Resume blood thinners the day after surgery unless told otherwise.    Wound Care    Leave surgical dressing in place.  May shower (dressing is waterproof.)  No pool, ocean, lake, hot-tub or   bath for 3 weeks. If you were given an abdominal binder, please wear it as much as possible (day & night)   for 2 weeks, but you must remove it for showers.  Ice packs to the area intermittently for several days help   with pain and swelling.  Bruising (“black and blue”) is common.  Treatment is…Ice & Rest. Diet    Eat light on the day of surgery. Nausea and vomiting can occur after anesthesia,   but usually resolve within 24 hours.  Resume normal diet the following day.      Activity    Rest!  No heavy lifting or strenuous activity.    Medications    Extra-Strength Tylenol for pain.  Tramadol for severe pain only.  Your prescription was sent electronically to your pharmacy.  Remember, Tramadol is a strong narcotic-like pain reliever which can cause drowsiness, upset stomach and constipation.  It should always be taken with food.  You can use stool softener (Mineral Oil) or laxative (MiraLax or Dulcolax) if constipated.  An antibiotic is given during surgery.  No antibiotic needed at home.   Resume all previous medications.  Resume blood thinners the day after surgery unless told otherwise.    Wound Care    Leave surgical dressing in place.  May shower (dressing is waterproof.)  No pool, ocean, lake, hot-tub or   bath for 3 weeks. If you were given an abdominal binder, please wear it as much as possible (day & night)   for 2 weeks, but you must remove it for showers.  Ice packs to the area intermittently for several days help   with pain and swelling.  Bruising (“black and blue”) is common.  Treatment is…Ice & Rest.

## 2020-08-31 NOTE — PRE-ANESTHESIA EVALUATION ADULT - NSANTHADDINFOFT_GEN_ALL_CORE
Procedure/Risks explained for moderate/deep sedation + routine monitoring; GA backup. Patient understands stated anesthetic plan and agrees to proceed.

## 2020-08-31 NOTE — BRIEF OPERATIVE NOTE - NSICDXBRIEFPROCEDURE_GEN_ALL_CORE_FT
PROCEDURES:  Repair of incarcerated incisional hernia using mesh 31-Aug-2020 08:53:19  Darin Madrigal

## 2020-08-31 NOTE — ASU DISCHARGE PLAN (ADULT/PEDIATRIC) - CARE PROVIDER_API CALL
Darin Madrigal  SURGERY  [address] [place] [id number]  Phone: [phone]  Fax: [phone]  Scheduled Appointment: 09/10/2020

## 2020-09-03 DIAGNOSIS — Z79.01 LONG TERM (CURRENT) USE OF ANTICOAGULANTS: ICD-10-CM

## 2020-09-03 DIAGNOSIS — K42.0 UMBILICAL HERNIA WITH OBSTRUCTION, WITHOUT GANGRENE: ICD-10-CM

## 2020-09-03 DIAGNOSIS — Z87.891 PERSONAL HISTORY OF NICOTINE DEPENDENCE: ICD-10-CM

## 2020-09-03 DIAGNOSIS — I48.91 UNSPECIFIED ATRIAL FIBRILLATION: ICD-10-CM

## 2020-09-03 DIAGNOSIS — I10 ESSENTIAL (PRIMARY) HYPERTENSION: ICD-10-CM

## 2020-09-03 DIAGNOSIS — E78.00 PURE HYPERCHOLESTEROLEMIA, UNSPECIFIED: ICD-10-CM

## 2020-09-10 ENCOUNTER — APPOINTMENT (OUTPATIENT)
Dept: SURGERY | Facility: CLINIC | Age: 75
End: 2020-09-10
Payer: MEDICARE

## 2020-09-10 DIAGNOSIS — K42.0 UMBILICAL HERNIA WITH OBSTRUCTION, W/OUT GANGRENE: ICD-10-CM

## 2020-09-10 PROCEDURE — 99024 POSTOP FOLLOW-UP VISIT: CPT

## 2020-09-10 NOTE — ASSESSMENT
[FreeTextEntry1] : Elfego underwent the repair of his incarcerated periumbilical incisional hernia with mesh on August 31, 2020 under local with IV sedation without any problems or complications. His wound is clean, dry and intact. There is no evidence of erythema, seroma formation or infection. He is tolerating a diet and having normal bowel movements. He denies any significant postoperative pain or discomfort at this time.\par \par He was counseled and reassured. Elfego was discharged from the office with no specific followup necessary, but he knows to avoid any heavy lifting or strenuous activity for the next several weeks.  We also discussed the importance of calorie restriction and healthy eating with regard to weight loss, hernia recurrence and his overall health. He was also encouraged to continue to wear his abdominal binder for the better part of the next month, especially since he was not wearing it today.

## 2020-09-15 NOTE — H&P PST ADULT - NEUROLOGICAL
"I'm having my implants removed, my right 1 ruptured" Alert & oriented; no sensory, motor or coordination deficits, normal reflexes

## 2020-12-03 NOTE — ASU PATIENT PROFILE, ADULT - NSALCOHOLFREQ_GEN_A_CORE_SD
monthly or less Hydroxyzine Pregnancy And Lactation Text: This medication is not safe during pregnancy and should not be taken. It is also excreted in breast milk and breast feeding isn't recommended.

## 2020-12-07 ENCOUNTER — TRANSCRIPTION ENCOUNTER (OUTPATIENT)
Age: 75
End: 2020-12-07

## 2020-12-07 NOTE — ASU PREOP CHECKLIST - INTERNAL PROSTHESES
Pt provided a urine specimen cup at bedside with instructions that a clean catch urine sample is needed for a urinalysis; pt verbalizes understanding.  Will continue to monitor.  
no

## 2021-02-25 ENCOUNTER — APPOINTMENT (OUTPATIENT)
Dept: NEUROLOGY | Facility: CLINIC | Age: 76
End: 2021-02-25
Payer: MEDICARE

## 2021-02-25 VITALS
OXYGEN SATURATION: 99 % | TEMPERATURE: 97.1 F | BODY MASS INDEX: 29.8 KG/M2 | SYSTOLIC BLOOD PRESSURE: 135 MMHG | HEART RATE: 79 BPM | DIASTOLIC BLOOD PRESSURE: 82 MMHG | HEIGHT: 72 IN | WEIGHT: 220 LBS

## 2021-02-25 PROCEDURE — 99213 OFFICE O/P EST LOW 20 MIN: CPT

## 2021-02-25 RX ORDER — BENAZEPRIL HYDROCHLORIDE 40 MG/1
40 TABLET, FILM COATED ORAL
Refills: 0 | Status: DISCONTINUED | COMMUNITY
End: 2021-02-25

## 2021-02-26 NOTE — ASSESSMENT
[FreeTextEntry1] : Anxiety D/O\par PTSD\par A-Fib\par anemia\par DLD\par HTN\par \par \par plan\par limited number of Fioricet and Xanax\par discussed the issues related to weight/sleep and also exercise\par

## 2021-02-26 NOTE — PHYSICAL EXAM
[FreeTextEntry1] : A/A/Ox3\par follows 4 step commands\par Crosses the midline well\par good attention\par CN- intact \par No drift\par good range of motion of the neck\par stable gait\par negative Romberg\par no dysmetria

## 2021-02-26 NOTE — HISTORY OF PRESENT ILLNESS
[FreeTextEntry1] : Elfego is here for the F/U. He says he has been staying in and for the most part spending time and taking care of his autistic son. They did not travel to [place] this year and being in Pandemic he is really not that active. He gained some weight. \par still having episodes of panic attack that responds well to Xanax.\par is in the process of doing some dental work. This required multiple Covid tests.All were negative\par He occasionally feels achy and tense in his neck\par Many of  his panic attacks are coming after waking up from a bad dream\par No falls \par No significant spinal pain\par No vertigo\par still gets HA , often in the front of the head and at times waking up with that. The Ha could become more generalized and more diffuse\par She is having regular visits to GI and his cardiologist\par

## 2021-03-30 ENCOUNTER — APPOINTMENT (OUTPATIENT)
Dept: GASTROENTEROLOGY | Facility: CLINIC | Age: 76
End: 2021-03-30
Payer: MEDICARE

## 2021-03-30 DIAGNOSIS — R14.0 ABDOMINAL DISTENSION (GASEOUS): ICD-10-CM

## 2021-03-30 DIAGNOSIS — Z86.2 PERSONAL HISTORY OF DISEASES OF THE BLOOD AND BLOOD-FORMING ORGANS AND CERTAIN DISORDERS INVOLVING THE IMMUNE MECHANISM: ICD-10-CM

## 2021-03-30 PROCEDURE — 99213 OFFICE O/P EST LOW 20 MIN: CPT | Mod: 95

## 2021-03-30 NOTE — ASSESSMENT
[FreeTextEntry1] : 73 year old male pt with atrial fibrillation on eliquis, HTN, Hx of LUISITO, no gross GI bleeding \par s/p EGD, colonoscopy by Dr Ash: EGD unremarkable (2019), colonoscopy (2018): HP, diverticulosis, hemorrhoids.\par Had repeat EGD, colonoscopy and a capsule endoscopy with me in 2019 to show diverticulosis, and esophagitis, capsule showed non bleeding jejunal ectasia\par No gross GI bleeding, weight loss or any alarm signs\par Now following w repeat labs to show recurrent LUISITO when he stops iron supplementation, but Hg is 12.3. \par \par Advised to continue iron supplementation\par will repeat EGD and capsule if LUISITO does not respond to PO iron\par F/P with PMD for repeat labs. \par Phazyme PRN for bloating

## 2021-03-30 NOTE — HISTORY OF PRESENT ILLNESS
[Home] : at home, [unfilled] , at the time of the visit. [Verbal consent obtained from patient] : the patient, [unfilled] [Medical Office: ([facility])___] : at the medical office located in  [FreeTextEntry4] : Antonia Canales [de-identified] : 73 year old male pt with atrial fibrillation on eliquis, HTN, Hx of LUISITO, no gross GI bleeding \par s/p EGD, colonoscopy by Dr Ash: EGD unremarkable (2019), colonoscopy (2018): HP, diverticulosis, hemorrhoids.\par Had repeat EGD, colonoscopy and a capsule endoscopy with me in 2019 to show diverticulosis, and esophagitis, capsule showed non bleeding jejunal ectasia\par No gross GI bleeding, weight loss or any alarm signs\par Now following w repeat labs to show recurrent LUISITO when he stops iron supplementation, but Hg is 12.3.

## 2021-03-30 NOTE — PHYSICAL EXAM
[General Appearance - Alert] : alert [Hearing Threshold Finger Rub Not Garrett] : hearing was normal [] : no respiratory distress [Abdomen Mass (___ Cm)] : no abdominal mass palpated [Skin Color & Pigmentation] : normal skin color and pigmentation [Abnormal Walk] : normal gait [Oriented To Time, Place, And Person] : oriented to person, place, and time

## 2021-04-12 ENCOUNTER — NON-APPOINTMENT (OUTPATIENT)
Age: 76
End: 2021-04-12

## 2021-05-06 ENCOUNTER — APPOINTMENT (OUTPATIENT)
Dept: SURGERY | Facility: CLINIC | Age: 76
End: 2021-05-06
Payer: MEDICARE

## 2021-05-06 VITALS — WEIGHT: 230 LBS | HEIGHT: 72 IN | BODY MASS INDEX: 31.15 KG/M2

## 2021-05-06 DIAGNOSIS — R10.32 LEFT LOWER QUADRANT PAIN: ICD-10-CM

## 2021-05-06 DIAGNOSIS — E66.09 OTHER OBESITY DUE TO EXCESS CALORIES: ICD-10-CM

## 2021-05-06 PROCEDURE — 99213 OFFICE O/P EST LOW 20 MIN: CPT

## 2021-05-06 NOTE — ASSESSMENT
[FreeTextEntry1] : Elfego presents back to the office approximately 9 months after the repair of his incarcerated periumbilical incisional hernia with mesh with concerns about left groin discomfort which he notices occasionally on the treadmill possibly related to two previous left inguinal hernia repairs with mesh by Dr. Lester causing him concern. He has gained approximately 10 pounds over the last 9 months.\par \par Physical examination demonstrates a well-healed scar in the left groin with no evidence of hernia recurrence or delayed wound complications. He does feel a small nodule in the superior lateral aspect most likely related to a suture or a small fold of the mesh in this area which is of no significant clinical concern. Examination of his right groin is also unremarkable. Both testicles are normal. His umbilical examination demonstrates no evidence of hernia recurrence or delayed wound complications. He continues to have a mild diastasis recti likely related to his excess abdominal weight. His current BMI is now 31.\par \par Elfego was counseled and reassured. We also discussed the importance of calorie restriction and healthy eating with regard to weight loss, hernia recurrence and his overall health. He may return to me in the future if his symptoms persist or worsen. No surgical or radiological intervention is warranted at this time.

## 2021-05-06 NOTE — PHYSICAL EXAM
[JVD] : no jugular venous distention  [Normal Breath Sounds] : Normal breath sounds [No Rash or Lesion] : No rash or lesion [Alert] : alert [Calm] : calm [de-identified] : overweight [de-identified] : normal [de-identified] : normal testicles [de-identified] : moderately protuberant abdomen, mild diastasis recti\par \par

## 2021-05-24 NOTE — PATIENT PROFILE ADULT. - NSNUTRITIONRISK_GI_A_CORE
Follow head injury precautions and use Tylenol Motrin for discomfort.  Follow-up with Yamilka Pascual your primary care provider for recheck in 24 to 48 hours if still having symptoms and return to the ER if worse   No indicators present

## 2021-06-10 ENCOUNTER — RX RENEWAL (OUTPATIENT)
Age: 76
End: 2021-06-10

## 2021-12-08 ENCOUNTER — TRANSCRIPTION ENCOUNTER (OUTPATIENT)
Age: 76
End: 2021-12-08

## 2021-12-30 ENCOUNTER — APPOINTMENT (OUTPATIENT)
Dept: NEUROLOGY | Facility: CLINIC | Age: 76
End: 2021-12-30
Payer: MEDICARE

## 2021-12-30 PROCEDURE — 99442: CPT | Mod: 95

## 2021-12-31 NOTE — ASSESSMENT
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

## 2021-12-31 NOTE — HISTORY OF PRESENT ILLNESS
[Home] : at home, [unfilled] , at the time of the visit. [Medical Office: ([facility])___] : at the medical office located in  [Verbal consent obtained from patient] : the patient, [unfilled] [FreeTextEntry1] : Elfego was supposed to have this visit as  telehealth however had some technical issues and we converted that to the telephonic visit\par He is at his baseline. They have not traveled to [place] for quite a while . It was due to the covid related issues and also the breakthrough seizures that his son has been having\par He says he is getting a lot of HA and since last visit also has had few periods of not feeling well like in the past.Each could last for couple of days and during that he feels slightly apprehensive, lightheaded..\par He ususally takes 1/2 of the 0.25 mg Xanax that could also help\par otherwise no change\par he is vaccinated \par he sees his PMD regularly

## 2022-01-18 ENCOUNTER — APPOINTMENT (OUTPATIENT)
Dept: SURGERY | Facility: CLINIC | Age: 77
End: 2022-01-18

## 2022-02-22 ENCOUNTER — TRANSCRIPTION ENCOUNTER (OUTPATIENT)
Age: 77
End: 2022-02-22

## 2022-03-07 ENCOUNTER — TRANSCRIPTION ENCOUNTER (OUTPATIENT)
Age: 77
End: 2022-03-07

## 2022-04-01 NOTE — ED ADULT NURSE NOTE - NS ED NOTE  TALK SOMEONE YN
Assessment/Plan:    Problem List Items Addressed This Visit        Respiratory    Mild intermittent asthma with acute exacerbation - Primary    Relevant Medications    prednisoLONE (PRELONE) 15 MG/5ML syrup      Other Visit Diagnoses     Acute non-recurrent frontal sinusitis        Relevant Orders    COVID/FLU/RSV (Completed)          Component Ref Range & Units 3/31/22  4:51 PM   SARS-CoV-2 Negative Negative    INFLUENZA A PCR Negative Negative    INFLUENZA B PCR Negative Negative    RSV PCR Negative Negative     · Amoxicillin for sinusitis  · Prednisone due to asthma exacerbation  · Strict return and ED precautions discussed with mom who expressed understanding    No follow-ups on file  A chart review was performed and previous primary care visit notes were reviewed  All applicable imaging studies were reviewed and images were reviewed personally  All applicable laboratory studies were reviewed personally  Care everywhere review was performed if  available and all pertinent notes were reviewed  Subjective:     HPI: Elizabeth Shelton is a 5 y o  male who  has no past medical history on file  who presented to the office today for SUBJECTIVE:   Elizabeth Shelton is a 5 y o  male who complains of congestion, sneezing, sore throat, swollen glands, nasal blockage, post nasal drip, productive cough and cough described as productive for 2 days  He denies a history of fevers and nausea and has a history of asthma       The following portions of the patient's history were reviewed and updated as appropriate: allergies, current medications, past family history, past medical history, past social history, past surgical history, and problem list     Current Outpatient Medications on File Prior to Visit   Medication Sig Dispense Refill    Alaway 0 025 % ophthalmic solution INSTILL 1 DROP INTO BOTH EYES TWICE A DAY 10 mL 1    albuterol (2 5 mg/3 mL) 0 083 % nebulizer solution 3 mL      albuterol (2 5 mg/3 mL) 0 083 % nebulizer solution Take 1 vial (2 5 mg total) by nebulization every 6 (six) hours as needed for wheezing or shortness of breath (Patient not taking: Reported on 3/2/2020) 30 vial 2    albuterol (ACCUNEB) 1 25 MG/3ML nebulizer solution Take 3 mL by nebulization every 4 (four) hours as needed for wheezing 3 mL 2    albuterol (Ventolin HFA) 90 mcg/act inhaler Inhale 2 puffs every 6 (six) hours as needed for wheezing or shortness of breath 1 each 0    brompheniramine-pseudoephedrine-DM 30-2-10 MG/5ML syrup Take 2 5 mL by mouth 4 (four) times a day as needed for congestion or cough 473 mL 0    cetirizine (ZyrTEC) oral solution Take 2 5 mL (2 5 mg total) by mouth daily 473 mL 2    hydrocortisone 1 % cream Apply topically 4 (four) times a day as needed for rash (Patient not taking: Reported on 11/20/2019) 30 g 0    ibuprofen (MOTRIN) 100 mg/5 mL suspension Take 5 mL (100 mg total) by mouth every 6 (six) hours as needed for mild pain, fever or headaches 473 mL 0     No current facility-administered medications on file prior to visit  Review of Systems   Constitutional: Negative for chills and fever  HENT: Positive for congestion, rhinorrhea and sore throat  Negative for ear pain  Eyes: Negative for pain and redness  Respiratory: Positive for cough and wheezing  Cardiovascular: Negative for chest pain and leg swelling  Gastrointestinal: Negative for abdominal pain and vomiting  Genitourinary: Negative for frequency and hematuria  Musculoskeletal: Negative for gait problem and joint swelling  Skin: Negative for color change and rash  Neurological: Negative for seizures and syncope  All other systems reviewed and are negative              Objective:    BP 96/60 (BP Location: Right arm, Patient Position: Sitting, Cuff Size: Child)   Pulse (!) 155   Temp 97 9 °F (36 6 °C) (Temporal)   Resp 22   Ht 3' 4" (1 016 m)   Wt 23 6 kg (52 lb)   SpO2 95%   BMI 22 85 kg/m²     Physical Exam  Constitutional:       General: He is active  He is not in acute distress  Appearance: Normal appearance  He is well-developed and normal weight  HENT:      Right Ear: Tympanic membrane, ear canal and external ear normal  There is no impacted cerumen  Left Ear: Tympanic membrane, ear canal and external ear normal  There is no impacted cerumen  Nose: Mucosal edema present  Right Turbinates: Swollen  Left Turbinates: Swollen  Mouth/Throat:      Mouth: Mucous membranes are moist    Eyes:      General: Red reflex is present bilaterally  Allergic shiner present  Right eye: No discharge  Left eye: No discharge  Extraocular Movements: Extraocular movements intact  Conjunctiva/sclera: Conjunctivae normal       Pupils: Pupils are equal, round, and reactive to light  Cardiovascular:      Rate and Rhythm: Normal rate and regular rhythm  Pulses: Normal pulses  Heart sounds: Normal heart sounds  Pulmonary:      Effort: Pulmonary effort is normal       Breath sounds: Wheezing present  Abdominal:      General: Abdomen is flat  Palpations: Abdomen is soft  There is no mass  Tenderness: There is no abdominal tenderness  Hernia: No hernia is present  Musculoskeletal:         General: Normal range of motion  Cervical back: Normal range of motion  Skin:     General: Skin is warm and dry  Neurological:      Mental Status: He is alert  Motor: No weakness  Gait: Gait normal          CLAUDETTE Agrawal  04/01/22  11:39 AM    There are no Patient Instructions on file for this visit  No

## 2022-05-28 ENCOUNTER — NON-APPOINTMENT (OUTPATIENT)
Age: 77
End: 2022-05-28

## 2022-05-29 ENCOUNTER — INPATIENT (INPATIENT)
Facility: HOSPITAL | Age: 77
LOS: 3 days | Discharge: HOME | End: 2022-06-02
Attending: INTERNAL MEDICINE | Admitting: INTERNAL MEDICINE
Payer: MEDICARE

## 2022-05-29 VITALS
SYSTOLIC BLOOD PRESSURE: 118 MMHG | OXYGEN SATURATION: 98 % | RESPIRATION RATE: 20 BRPM | HEIGHT: 72 IN | WEIGHT: 220.02 LBS | HEART RATE: 88 BPM | TEMPERATURE: 98 F | DIASTOLIC BLOOD PRESSURE: 50 MMHG

## 2022-05-29 DIAGNOSIS — K40.20 BILATERAL INGUINAL HERNIA, WITHOUT OBSTRUCTION OR GANGRENE, NOT SPECIFIED AS RECURRENT: Chronic | ICD-10-CM

## 2022-05-29 DIAGNOSIS — Z85.89 PERSONAL HISTORY OF MALIGNANT NEOPLASM OF OTHER ORGANS AND SYSTEMS: Chronic | ICD-10-CM

## 2022-05-29 LAB
ALBUMIN SERPL ELPH-MCNC: 4.1 G/DL — SIGNIFICANT CHANGE UP (ref 3.5–5.2)
ALP SERPL-CCNC: 75 U/L — SIGNIFICANT CHANGE UP (ref 30–115)
ALT FLD-CCNC: 14 U/L — SIGNIFICANT CHANGE UP (ref 0–41)
ANION GAP SERPL CALC-SCNC: 13 MMOL/L — SIGNIFICANT CHANGE UP (ref 7–14)
AST SERPL-CCNC: 17 U/L — SIGNIFICANT CHANGE UP (ref 0–41)
BASOPHILS # BLD AUTO: 0.05 K/UL — SIGNIFICANT CHANGE UP (ref 0–0.2)
BASOPHILS NFR BLD AUTO: 0.4 % — SIGNIFICANT CHANGE UP (ref 0–1)
BILIRUB SERPL-MCNC: 0.5 MG/DL — SIGNIFICANT CHANGE UP (ref 0.2–1.2)
BUN SERPL-MCNC: 21 MG/DL — HIGH (ref 10–20)
CALCIUM SERPL-MCNC: 9.7 MG/DL — SIGNIFICANT CHANGE UP (ref 8.5–10.1)
CHLORIDE SERPL-SCNC: 102 MMOL/L — SIGNIFICANT CHANGE UP (ref 98–110)
CO2 SERPL-SCNC: 24 MMOL/L — SIGNIFICANT CHANGE UP (ref 17–32)
CREAT SERPL-MCNC: 1.2 MG/DL — SIGNIFICANT CHANGE UP (ref 0.7–1.5)
EGFR: 63 ML/MIN/1.73M2 — SIGNIFICANT CHANGE UP
EOSINOPHIL # BLD AUTO: 0.11 K/UL — SIGNIFICANT CHANGE UP (ref 0–0.7)
EOSINOPHIL NFR BLD AUTO: 0.8 % — SIGNIFICANT CHANGE UP (ref 0–8)
GLUCOSE SERPL-MCNC: 106 MG/DL — HIGH (ref 70–99)
HCT VFR BLD CALC: 50.2 % — SIGNIFICANT CHANGE UP (ref 42–52)
HGB BLD-MCNC: 15.1 G/DL — SIGNIFICANT CHANGE UP (ref 14–18)
IMM GRANULOCYTES NFR BLD AUTO: 0.4 % — HIGH (ref 0.1–0.3)
LYMPHOCYTES # BLD AUTO: 1.15 K/UL — LOW (ref 1.2–3.4)
LYMPHOCYTES # BLD AUTO: 8.1 % — LOW (ref 20.5–51.1)
MAGNESIUM SERPL-MCNC: 1.6 MG/DL — LOW (ref 1.8–2.4)
MCHC RBC-ENTMCNC: 22.9 PG — LOW (ref 27–31)
MCHC RBC-ENTMCNC: 30.1 G/DL — LOW (ref 32–37)
MCV RBC AUTO: 76.1 FL — LOW (ref 80–94)
MONOCYTES # BLD AUTO: 1.14 K/UL — HIGH (ref 0.1–0.6)
MONOCYTES NFR BLD AUTO: 8 % — SIGNIFICANT CHANGE UP (ref 1.7–9.3)
NEUTROPHILS # BLD AUTO: 11.69 K/UL — HIGH (ref 1.4–6.5)
NEUTROPHILS NFR BLD AUTO: 82.3 % — HIGH (ref 42.2–75.2)
NRBC # BLD: 0 /100 WBCS — SIGNIFICANT CHANGE UP (ref 0–0)
NT-PROBNP SERPL-SCNC: 1962 PG/ML — HIGH (ref 0–300)
PLATELET # BLD AUTO: 237 K/UL — SIGNIFICANT CHANGE UP (ref 130–400)
POTASSIUM SERPL-MCNC: 5 MMOL/L — SIGNIFICANT CHANGE UP (ref 3.5–5)
POTASSIUM SERPL-SCNC: 5 MMOL/L — SIGNIFICANT CHANGE UP (ref 3.5–5)
PROT SERPL-MCNC: 6.8 G/DL — SIGNIFICANT CHANGE UP (ref 6–8)
RBC # BLD: 6.6 M/UL — HIGH (ref 4.7–6.1)
RBC # FLD: 19.6 % — HIGH (ref 11.5–14.5)
SODIUM SERPL-SCNC: 139 MMOL/L — SIGNIFICANT CHANGE UP (ref 135–146)
TROPONIN T SERPL-MCNC: <0.01 NG/ML — SIGNIFICANT CHANGE UP
WBC # BLD: 14.2 K/UL — HIGH (ref 4.8–10.8)
WBC # FLD AUTO: 14.2 K/UL — HIGH (ref 4.8–10.8)

## 2022-05-29 PROCEDURE — 99223 1ST HOSP IP/OBS HIGH 75: CPT | Mod: AI

## 2022-05-29 PROCEDURE — 99285 EMERGENCY DEPT VISIT HI MDM: CPT | Mod: FS,CS

## 2022-05-29 PROCEDURE — 93010 ELECTROCARDIOGRAM REPORT: CPT | Mod: 76

## 2022-05-29 RX ORDER — LOSARTAN POTASSIUM 100 MG/1
100 TABLET, FILM COATED ORAL DAILY
Refills: 0 | Status: DISCONTINUED | OUTPATIENT
Start: 2022-05-29 | End: 2022-06-02

## 2022-05-29 RX ORDER — ATORVASTATIN CALCIUM 80 MG/1
40 TABLET, FILM COATED ORAL AT BEDTIME
Refills: 0 | Status: DISCONTINUED | OUTPATIENT
Start: 2022-05-29 | End: 2022-06-02

## 2022-05-29 RX ORDER — MAGNESIUM SULFATE 500 MG/ML
2 VIAL (ML) INJECTION ONCE
Refills: 0 | Status: COMPLETED | OUTPATIENT
Start: 2022-05-29 | End: 2022-05-29

## 2022-05-29 RX ORDER — SOTALOL HCL 120 MG
80 TABLET ORAL
Refills: 0 | Status: DISCONTINUED | OUTPATIENT
Start: 2022-05-29 | End: 2022-05-30

## 2022-05-29 RX ORDER — LANOLIN ALCOHOL/MO/W.PET/CERES
3 CREAM (GRAM) TOPICAL AT BEDTIME
Refills: 0 | Status: DISCONTINUED | OUTPATIENT
Start: 2022-05-29 | End: 2022-06-02

## 2022-05-29 RX ORDER — APIXABAN 2.5 MG/1
5 TABLET, FILM COATED ORAL EVERY 12 HOURS
Refills: 0 | Status: DISCONTINUED | OUTPATIENT
Start: 2022-05-30 | End: 2022-06-02

## 2022-05-29 RX ORDER — DILTIAZEM HCL 120 MG
10 CAPSULE, EXT RELEASE 24 HR ORAL ONCE
Refills: 0 | Status: COMPLETED | OUTPATIENT
Start: 2022-05-29 | End: 2022-05-29

## 2022-05-29 RX ORDER — AMLODIPINE BESYLATE 2.5 MG/1
1 TABLET ORAL
Qty: 0 | Refills: 0 | DISCHARGE

## 2022-05-29 RX ORDER — PANTOPRAZOLE SODIUM 20 MG/1
40 TABLET, DELAYED RELEASE ORAL
Refills: 0 | Status: DISCONTINUED | OUTPATIENT
Start: 2022-05-29 | End: 2022-06-02

## 2022-05-29 RX ORDER — DILTIAZEM HCL 120 MG
30 CAPSULE, EXT RELEASE 24 HR ORAL EVERY 8 HOURS
Refills: 0 | Status: DISCONTINUED | OUTPATIENT
Start: 2022-05-29 | End: 2022-05-30

## 2022-05-29 RX ORDER — DIVALPROEX SODIUM 500 MG/1
250 TABLET, DELAYED RELEASE ORAL DAILY
Refills: 0 | Status: DISCONTINUED | OUTPATIENT
Start: 2022-05-29 | End: 2022-06-02

## 2022-05-29 RX ORDER — ESCITALOPRAM OXALATE 10 MG/1
10 TABLET, FILM COATED ORAL DAILY
Refills: 0 | Status: DISCONTINUED | OUTPATIENT
Start: 2022-05-29 | End: 2022-06-02

## 2022-05-29 RX ORDER — BENAZEPRIL HYDROCHLORIDE 40 MG/1
1 TABLET ORAL
Qty: 0 | Refills: 0 | DISCHARGE

## 2022-05-29 RX ADMIN — Medication 25 GRAM(S): at 21:29

## 2022-05-29 RX ADMIN — Medication 10 MILLIGRAM(S): at 19:55

## 2022-05-29 RX ADMIN — Medication 10 MILLIGRAM(S): at 21:54

## 2022-05-29 NOTE — H&P ADULT - NSHPLABSRESULTS_GEN_ALL_CORE
.                          15.1   14.20 )-----------( 237      ( 29 May 2022 18:57 )             50.2     05-29    139  |  102  |  21<H>  ----------------------------<  106<H>  5.0   |  24  |  1.2    Ca    9.7      29 May 2022 18:57  Mg     1.6     05-29    TPro  6.8  /  Alb  4.1  /  TBili  0.5  /  DBili  x   /  AST  17  /  ALT  14  /  AlkPhos  75  05-29      CARDIAC MARKERS ( 29 May 2022 18:57 )  x     / <0.01 ng/mL / x     / x     / x        Serum Pro-Brain Natriuretic Peptide: 1962 pg/mL (05-29-22 @ 19:29)      12 Lead ECG:   Ventricular Rate 96 BPM  QTC Calculation(Bazett) 371 ms    Diagnosis Line Atrial fibrillation  Abnormal ECG    Confirmed by Rolando Arredondo ([id number]) on 5/29/2022 7:24:35 PM (05-29-22 @ 17:50)    RADIOLOGY & ADDITIONAL STUDIES:

## 2022-05-29 NOTE — ED PROVIDER NOTE - PHYSICAL EXAMINATION
VITAL SIGNS: I have reviewed nursing notes and confirm.  CONSTITUTIONAL: Patient is in no acute distress.  SKIN: Skin exam is warm and dry, no acute rash.  HEAD: Normocephalic; atraumatic.  EYES: PERRL, EOM intact; conjunctiva and sclera clear.  ENT: No nasal discharge; airway clear.   NECK: Supple; non tender.  CARD: S1, S2 normal; no murmurs, gallops, or rubs. Irregularly irregular rate and rhythm.   RESP: Clear to auscultation bilaterally. No wheezes, rales or rhonchi.  ABD: Normal bowel sounds; soft; non-distended; non-tender.   EXT: Normal ROM. No edema.  LYMPH: No acute cervical adenopathy.  NEURO: Alert, oriented. Grossly unremarkable. No focal deficits.  PSYCH: Cooperative, appropriate.

## 2022-05-29 NOTE — ED PROVIDER NOTE - OBJECTIVE STATEMENT
75 yo M with pmhx of afibb on eliquis presenting for evaluation of shortness of breath since last night. Patient states he ate last night after 9pm and started to feel indigestion around 11pm and then had shortness of breath throughout the whole night. Patient states he was felling dizzy and lightheaded this morning and then went to [facility]. Patient states that he was treated for pneumonia in April and has been having a lingering cough throughout May and is scheduled to see a pulmonologist. No cp, fever, chills, abdominal pain, nausea, vomiting, diarrhea, back pain, urinary symptoms, headache, paresthesias, or weakness. 75 yo M with pmhx of afibb on eliquis, HTN, HLD presenting for evaluation of shortness of breath since last night. Patient states he ate last night after 9pm and started to feel indigestion around 11pm and then had shortness of breath throughout the whole night. Patient states he was felling dizzy and lightheaded this morning and then went to [facility]. Patient states that he was treated for pneumonia in April and has been having a lingering cough throughout May and is scheduled to see a pulmonologist. No cp, fever, chills, abdominal pain, nausea, vomiting, diarrhea, back pain, urinary symptoms, headache, paresthesias, or weakness.

## 2022-05-29 NOTE — ED PROVIDER NOTE - CLINICAL SUMMARY MEDICAL DECISION MAKING FREE TEXT BOX
Pt in AF with RVR, already on eliquis.  given cardizem in ER with response.  labs reviewed, mag 1.6 (repleted), trop neg, bnp 1,962. pt admitted to [facility] for continued cardiac eval.

## 2022-05-29 NOTE — ED PROVIDER NOTE - ATTENDING APP SHARED VISIT CONTRIBUTION OF CARE
75 y/o male with h/o htn, hld, af on eliquis, in ER with c/o not feeling well since last night. Pt states he had an 'indigestion' type feeling after eating, took a stool softner, had mulitple bm's throughout the night.  + feeling SOB all night.  today feeling lightheaded/dizzy.  went to an [facility] earlier today and found to be in afib with RVR, given dose of PO cardizem.  Pt had pna 2 months ago and has been having dry cough since then.  no f/c.  no CP.  no n/v, no diaphoresis.  no abd pain.  no LE swelling/pain, no ha/dizziness/loc.  PE  - nad, nc/at, eomi, perrl, op - clear, mmm, no resp distress, cta b/l, no w/r/r, hr 130s-140's, irreg, abd - soft, nt/nd, nabs, from x 4, no LE tenderness, swelling, A&O x 3, no focal neuro deficits.  -cardiac w/u, rate control

## 2022-05-29 NOTE — H&P ADULT - NSHPPHYSICALEXAM_GEN_ALL_CORE
VITALS:  T(F): 98.3 (05-29-22 @ 17:45), Max: 98.3 (05-29-22 @ 17:45)  HR: 88 (05-29-22 @ 22:04) (88 - 135)  BP: 97/60 (05-29-22 @ 22:04) (97/60 - 139/67)  RR: 20 (05-29-22 @ 20:23) (20 - 20)  SpO2: 97% (05-29-22 @ 20:23) (97% - 98%)      PHYSICAL EXAM:  GENERAL: NAD, well-developed  CHEST/LUNG: CTAB; No wheeze  HEART: RRR; No murmurs, rubs, or gallops  ABDOMEN: Soft, NT/ND; BS present  EXTREMITIES:  No cyanosis, or edema  NEUROLOGY: AAOx3  SKIN: No rashes or lesions VITALS:  T(F): 98.3 (05-29-22 @ 17:45), Max: 98.3 (05-29-22 @ 17:45)  HR: 88 (05-29-22 @ 22:04) (88 - 135)  BP: 97/60 (05-29-22 @ 22:04) (97/60 - 139/67)  RR: 20 (05-29-22 @ 20:23) (20 - 20)  SpO2: 97% (05-29-22 @ 20:23) (97% - 98%)      PHYSICAL EXAM:  GENERAL: NAD, well-developed  CHEST/LUNG: CTAB; No wheeze  HEART: irregularly irregular  ABDOMEN: Soft, NT/ND; BS present  EXTREMITIES:  No cyanosis, or edema  NEUROLOGY: AAOx3  SKIN: No rashes or lesions

## 2022-05-29 NOTE — ED PROVIDER NOTE - NS ED ATTENDING STATEMENT MOD
This was a shared visit with the ALBERTO. I reviewed and verified the documentation and independently performed the documented:

## 2022-05-29 NOTE — ED ADULT TRIAGE NOTE - CHIEF COMPLAINT QUOTE
patient reports sob and cough for last few days. patient went to [facility] noted to be in rapid afib patient given 25mg of cardizem. reports relief of symptoms

## 2022-05-29 NOTE — H&P ADULT - ATTENDING COMMENTS
HPI:  77 yo M with pmhx of afibb on eliquis, HTN, HLD presenting for evaluation of shortness of breath since last night. Patient states he ate last night after 9pm and started to feel indigestion around 11pm and then had shortness of breath throughout the whole night. Patient reports taking dulcolax last night and had multiple BMs today. Patient states he was felling dizzy and lightheaded this morning and then went to [facility] [place]. Patient states that he was treated for pneumonia in April and has been having a lingering cough throughout May and is scheduled to see a pulmonologist. He was prescribed Steroids, Albuterol inhaler, Symbicort and Duonebs by a visiting PA fe weeks ago. No cp, fever, chills, abdominal pain, nausea, vomiting, diarrhea, back pain, urinary symptoms, headache, paresthesias, or weakness.  In ED: Temp = 98.3; HR = 88; BP = 118/50; RR = 20; SaO2 = 98% on room air. Work up noted for WBC 14.2, hgb 15.2, Cr. 1.2, Mg 1.6, Trop 0.01, Pro-BNP 1962.  Patient received IV Cardizem 10 mg x 2, IV MgSO4 2 gm. (29 May 2022 22:28)    REVIEW OF SYSTEMS: see cc/HPI  CONSTITUTIONAL: No weakness, fevers or chills  EYES/ENT: No visual changes;  No vertigo or throat pain   NECK: No pain or stiffness  RESPIRATORY: (+) persistent cough, NO wheezing, NO hemoptysis; (+) shortness of breath  CARDIOVASCULAR: (+) chest pain or palpitations  GASTROINTESTINAL: No abdominal or epigastric pain. No nausea, vomiting, or hematemesis; No diarrhea or constipation. No melena or hematochezia.  GENITOURINARY: No dysuria, frequency or hematuria  NEUROLOGICAL: No numbness or weakness  SKIN: No itching, rashes    Physical Exam:  General: WN/WD NAD  Neurology: A&Ox3, nonfocal, follows commands  Eyes: PERRLA/ EOMI  ENT/Neck: Neck supple, trachea midline, No JVD  Respiratory: CTA B/L, No wheezing, rales, rhonchi,(+) intermittent cough  CV:  irregular rate and rhythm, S1S2, no murmurs, rubs or gallops  Abdominal: Soft, NT, ND +BS,   Extremities: No edema, + peripheral pulses  Skin: No Rashes, Hematoma, Ecchymosis    A/p  A. Fib w/ RVR  -admit to tele   -serial Stacia and EKGs  -Add Cardizem - oral   -c/w Sotalol, Eliquis   -echo and TSH   -serial EKG and Stacia   -Cardiology eval     Hypomagnesemia   -supplement Mg++ and repeat level ( gaol >2)    HTN   -c/w Losartan  -hold amlodipine and start Cardizem     Leukocytosis - recent steroid use and remote URI history  -check blood Cx and repeat WBC    DVT prophylaxis - c/w Eliquis     PATIENT SEEN by ATTENDING 5/29/22 ( note revised 5/30)

## 2022-05-29 NOTE — ED PROVIDER NOTE - PROGRESS NOTE DETAILS
Spoke to cardiology fellow recommends admit to med tele with consult to Dr. Issa. Recommended giving another dose IV cardizem and if still not rate controlled to start on cardizem drip.

## 2022-05-29 NOTE — H&P ADULT - HISTORY OF PRESENT ILLNESS
77 yo M with pmhx of afibb on eliquis, HTN, HLD presenting for evaluation of shortness of breath since last night. Patient states he ate last night after 9pm and started to feel indigestion around 11pm and then had shortness of breath throughout the whole night. Patient states he was felling dizzy and lightheaded this morning and then went to [facility] [place]. Patient states that he was treated for pneumonia in April and has been having a lingering cough throughout May and is scheduled to see a pulmonologist. No cp, fever, chills, abdominal pain, nausea, vomiting, diarrhea, back pain, urinary symptoms, headache, paresthesias, or weakness.  In ED: Temp = 98.3; HR = 88; BP = 118/50; RR = 20; SaO2 = 98% on room air. Work up noted for .  Patient received IV Cardizem 10 mg x 2, IV MgSO4 2 gm. 75 yo M with pmhx of afibb on eliquis, HTN, HLD presenting for evaluation of shortness of breath since last night. Patient states he ate last night after 9pm and started to feel indigestion around 11pm and then had shortness of breath throughout the whole night. Patient reports taking dulcolax last night and had multiple BMs today. Patient states he was felling dizzy and lightheaded this morning and then went to [facility] [place] [place]. Patient states that he was treated for pneumonia in April and has been having a lingering cough throughout May and is scheduled to see a pulmonologist. He was prescribed Steroids, Albuterol inhaler, Symbicort and Duonebs by a visiting PA fe weeks ago. No cp, fever, chills, abdominal pain, nausea, vomiting, diarrhea, back pain, urinary symptoms, headache, paresthesias, or weakness.  In ED: Temp = 98.3; HR = 88; BP = 118/50; RR = 20; SaO2 = 98% on room air. Work up noted for WBC 14.2, hgb 15.2, Cr. 1.2, Mg 1.6, Trop 0.01, Pro-BNP 1962.  Patient received IV Cardizem 10 mg x 2, IV MgSO4 2 gm.

## 2022-05-29 NOTE — H&P ADULT - ASSESSMENT
75 yo M with pmhx of afibb on eliquis, HTN, HLD presenting for evaluation of shortness of breath since last night. Patient states he ate last night after 9pm and started to feel indigestion around 11pm and then had shortness of breath throughout the whole night. Patient states he was felling dizzy and lightheaded this morning and then went to [facility]. Patient states that he was treated for pneumonia in April and has been having a lingering cough throughout May and is scheduled to see a pulmonologist. 77 yo M with pmhx of afibb on eliquis, HTN, HLD presenting for evaluation of shortness of breath since last night. Patient states he ate last night after 9pm and started to feel indigestion around 11pm and then had shortness of breath throughout the whole night.    # Afib with RVR  - c/w home dose of Sotalol, Eliquis  - s/p IV cadizem  - start cardizem 30 mg q 6 hr  - tele monitoring  - check echo  - check TSH  - trend trops  - correct electrolytes as needed    # HTN  - c/w losartan  - hold amlodipine  - started on Cardizem instead  - monitor BP    # Leukocytosis  - likely due to recent steroids  - no obvious signs of infection  - monitor off antibiotics    DVT: eliquis  GI: Pantoprazole  Diet: DASH  Activity: Increase as tolerated  Dispo: Acute for now

## 2022-05-29 NOTE — ED PROVIDER NOTE - NS ED ROS FT
Review of Systems:  	•	CONSTITUTIONAL - no fever, no diaphoresis, no chills  	•	SKIN - no rash  	•	HEMATOLOGIC - no bleeding, no bruising  	•	EYES - no eye pain, no blurry vision  	•	ENT - no congestion  	•	RESPIRATORY - + shortness of breath, + cough  	•	CARDIAC - no chest pain, no palpitations  	•	GI - no abd pain, no nausea, no vomiting, no diarrhea, no constipation  	•	GENITO-URINARY - no dysuria; no hematuria, no increased urinary frequency  	•	MUSCULOSKELETAL - no joint paint, no swelling, no redness  	•	NEUROLOGIC - +dizziness, no weakness, no headache, no paresthesias, no LOC  	•	PSYCH - no anxiety, no depression  	All other ROS are negative except as documented in HPI.

## 2022-05-30 LAB
ALBUMIN SERPL ELPH-MCNC: 3.8 G/DL — SIGNIFICANT CHANGE UP (ref 3.5–5.2)
ALP SERPL-CCNC: 74 U/L — SIGNIFICANT CHANGE UP (ref 30–115)
ALT FLD-CCNC: 12 U/L — SIGNIFICANT CHANGE UP (ref 0–41)
ANION GAP SERPL CALC-SCNC: 12 MMOL/L — SIGNIFICANT CHANGE UP (ref 7–14)
AST SERPL-CCNC: 17 U/L — SIGNIFICANT CHANGE UP (ref 0–41)
BASOPHILS # BLD AUTO: 0.05 K/UL — SIGNIFICANT CHANGE UP (ref 0–0.2)
BASOPHILS NFR BLD AUTO: 0.4 % — SIGNIFICANT CHANGE UP (ref 0–1)
BILIRUB SERPL-MCNC: 0.5 MG/DL — SIGNIFICANT CHANGE UP (ref 0.2–1.2)
BUN SERPL-MCNC: 20 MG/DL — SIGNIFICANT CHANGE UP (ref 10–20)
CALCIUM SERPL-MCNC: 9.3 MG/DL — SIGNIFICANT CHANGE UP (ref 8.5–10.1)
CHLORIDE SERPL-SCNC: 101 MMOL/L — SIGNIFICANT CHANGE UP (ref 98–110)
CO2 SERPL-SCNC: 25 MMOL/L — SIGNIFICANT CHANGE UP (ref 17–32)
CREAT SERPL-MCNC: 1.1 MG/DL — SIGNIFICANT CHANGE UP (ref 0.7–1.5)
EGFR: 70 ML/MIN/1.73M2 — SIGNIFICANT CHANGE UP
EOSINOPHIL # BLD AUTO: 0.13 K/UL — SIGNIFICANT CHANGE UP (ref 0–0.7)
EOSINOPHIL NFR BLD AUTO: 1 % — SIGNIFICANT CHANGE UP (ref 0–8)
GLUCOSE SERPL-MCNC: 109 MG/DL — HIGH (ref 70–99)
HCT VFR BLD CALC: 48.3 % — SIGNIFICANT CHANGE UP (ref 42–52)
HCV AB S/CO SERPL IA: 0.04 COI — SIGNIFICANT CHANGE UP
HCV AB SERPL-IMP: SIGNIFICANT CHANGE UP
HGB BLD-MCNC: 14.4 G/DL — SIGNIFICANT CHANGE UP (ref 14–18)
IMM GRANULOCYTES NFR BLD AUTO: 0.6 % — HIGH (ref 0.1–0.3)
LYMPHOCYTES # BLD AUTO: 1.33 K/UL — SIGNIFICANT CHANGE UP (ref 1.2–3.4)
LYMPHOCYTES # BLD AUTO: 10.7 % — LOW (ref 20.5–51.1)
MAGNESIUM SERPL-MCNC: 2.2 MG/DL — SIGNIFICANT CHANGE UP (ref 1.8–2.4)
MCHC RBC-ENTMCNC: 22.9 PG — LOW (ref 27–31)
MCHC RBC-ENTMCNC: 29.8 G/DL — LOW (ref 32–37)
MCV RBC AUTO: 76.8 FL — LOW (ref 80–94)
MONOCYTES # BLD AUTO: 1.1 K/UL — HIGH (ref 0.1–0.6)
MONOCYTES NFR BLD AUTO: 8.8 % — SIGNIFICANT CHANGE UP (ref 1.7–9.3)
NEUTROPHILS # BLD AUTO: 9.75 K/UL — HIGH (ref 1.4–6.5)
NEUTROPHILS NFR BLD AUTO: 78.5 % — HIGH (ref 42.2–75.2)
NRBC # BLD: 0 /100 WBCS — SIGNIFICANT CHANGE UP (ref 0–0)
PLATELET # BLD AUTO: 206 K/UL — SIGNIFICANT CHANGE UP (ref 130–400)
POTASSIUM SERPL-MCNC: 5.3 MMOL/L — HIGH (ref 3.5–5)
POTASSIUM SERPL-SCNC: 5.3 MMOL/L — HIGH (ref 3.5–5)
PROT SERPL-MCNC: 6.1 G/DL — SIGNIFICANT CHANGE UP (ref 6–8)
RBC # BLD: 6.29 M/UL — HIGH (ref 4.7–6.1)
RBC # FLD: 19 % — HIGH (ref 11.5–14.5)
SARS-COV-2 RNA SPEC QL NAA+PROBE: SIGNIFICANT CHANGE UP
SODIUM SERPL-SCNC: 138 MMOL/L — SIGNIFICANT CHANGE UP (ref 135–146)
TROPONIN T SERPL-MCNC: <0.01 NG/ML — SIGNIFICANT CHANGE UP
TSH SERPL-MCNC: 1.71 UIU/ML — SIGNIFICANT CHANGE UP (ref 0.27–4.2)
WBC # BLD: 12.43 K/UL — HIGH (ref 4.8–10.8)
WBC # FLD AUTO: 12.43 K/UL — HIGH (ref 4.8–10.8)

## 2022-05-30 PROCEDURE — 99233 SBSQ HOSP IP/OBS HIGH 50: CPT

## 2022-05-30 PROCEDURE — 99222 1ST HOSP IP/OBS MODERATE 55: CPT

## 2022-05-30 RX ORDER — DILTIAZEM HCL 120 MG
30 CAPSULE, EXT RELEASE 24 HR ORAL EVERY 6 HOURS
Refills: 0 | Status: DISCONTINUED | OUTPATIENT
Start: 2022-05-30 | End: 2022-05-31

## 2022-05-30 RX ORDER — AMIODARONE HYDROCHLORIDE 400 MG/1
400 TABLET ORAL
Refills: 0 | Status: DISCONTINUED | OUTPATIENT
Start: 2022-05-30 | End: 2022-06-02

## 2022-05-30 RX ORDER — LOPERAMIDE HCL 2 MG
2 TABLET ORAL ONCE
Refills: 0 | Status: COMPLETED | OUTPATIENT
Start: 2022-05-30 | End: 2022-05-30

## 2022-05-30 RX ORDER — DILTIAZEM HCL 120 MG
10 CAPSULE, EXT RELEASE 24 HR ORAL ONCE
Refills: 0 | Status: COMPLETED | OUTPATIENT
Start: 2022-05-30 | End: 2022-05-30

## 2022-05-30 RX ADMIN — APIXABAN 5 MILLIGRAM(S): 2.5 TABLET, FILM COATED ORAL at 17:29

## 2022-05-30 RX ADMIN — ATORVASTATIN CALCIUM 40 MILLIGRAM(S): 80 TABLET, FILM COATED ORAL at 22:51

## 2022-05-30 RX ADMIN — ESCITALOPRAM OXALATE 10 MILLIGRAM(S): 10 TABLET, FILM COATED ORAL at 17:30

## 2022-05-30 RX ADMIN — Medication 80 MILLIGRAM(S): at 06:31

## 2022-05-30 RX ADMIN — Medication 3 MILLIGRAM(S): at 00:39

## 2022-05-30 RX ADMIN — LOSARTAN POTASSIUM 100 MILLIGRAM(S): 100 TABLET, FILM COATED ORAL at 06:31

## 2022-05-30 RX ADMIN — DIVALPROEX SODIUM 250 MILLIGRAM(S): 500 TABLET, DELAYED RELEASE ORAL at 17:30

## 2022-05-30 RX ADMIN — Medication 30 MILLIGRAM(S): at 13:01

## 2022-05-30 RX ADMIN — Medication 3 MILLIGRAM(S): at 22:51

## 2022-05-30 RX ADMIN — AMIODARONE HYDROCHLORIDE 400 MILLIGRAM(S): 400 TABLET ORAL at 17:30

## 2022-05-30 RX ADMIN — APIXABAN 5 MILLIGRAM(S): 2.5 TABLET, FILM COATED ORAL at 06:31

## 2022-05-30 RX ADMIN — PANTOPRAZOLE SODIUM 40 MILLIGRAM(S): 20 TABLET, DELAYED RELEASE ORAL at 06:31

## 2022-05-30 RX ADMIN — Medication 30 MILLIGRAM(S): at 17:30

## 2022-05-30 RX ADMIN — Medication 2 MILLIGRAM(S): at 22:03

## 2022-05-30 RX ADMIN — Medication 10 MILLIGRAM(S): at 17:06

## 2022-05-30 RX ADMIN — Medication 30 MILLIGRAM(S): at 06:32

## 2022-05-30 RX ADMIN — Medication 10 MILLIGRAM(S): at 00:09

## 2022-05-30 RX ADMIN — Medication 30 MILLIGRAM(S): at 00:09

## 2022-05-30 NOTE — CONSULT NOTE ADULT - ASSESSMENT
Afib with RVR  DC amlodipine and Satolol  give cardizem 30 mg Q6 PO  start loading with amiodarone  mg BID  Continue with Eliquis

## 2022-05-30 NOTE — CONSULT NOTE ADULT - SUBJECTIVE AND OBJECTIVE BOX
Date of Admission:    CHIEF COMPLAINT: Short of breath    HISTORY OF PRESENT ILLNESS: 76yMale with PMH below presented to the hospital for worsening short of breath since few day, took the nebulizer without any benefit.  found to be in Afib with RVR in urgent care and was sent here.  Patient have a history of paroxysmal afib on DOACs    PAST MEDICAL & SURGICAL HISTORY:  HTN (hypertension)      Afib      Squamous cell carcinoma  scalp      Dyslipidemia      Gastritis      Malaria      Hernia, inguinal, bilateral      History of squamous cell carcinoma  scalp , moh&#x27;s procedure        HEALTH ISSUES - PROBLEM Dx:        FAMILY HISTORY:  No pertinent family history in first degree relatives      Allergies    No Known Allergies    Intolerances    	  Home Medications:  amLODIPine 5 mg oral tablet: 1 tab(s) orally once a day (29 May 2022 23:37)  Depakote 250 mg oral delayed release tablet: 1 tab(s) orally once a day (31 Aug 2020 07:39)  Eliquis 5 mg oral tablet: 1 tab(s) orally 2 times a day (31 Aug 2020 07:39)  escitalopram 10 mg oral tablet: 1 tab(s) orally once a day (29 May 2022 23:37)  losartan 100 mg oral tablet: 1 tab(s) orally once a day (29 May 2022 23:36)  pantoprazole 40 mg oral delayed release tablet: 1 tab(s) orally once a day (29 May 2022 23:37)  rosuvastatin 10 mg oral tablet: 1 tab(s) orally once a day (31 Aug 2020 07:39)  sotalol 80 mg oral tablet: 1 tab(s) orally 2 times a day (31 Aug 2020 07:39)    MEDICATIONS  (STANDING):  apixaban 5 milliGRAM(s) Oral every 12 hours  atorvastatin 40 milliGRAM(s) Oral at bedtime  diltiazem    Tablet 30 milliGRAM(s) Oral every 8 hours  diVALproex  milliGRAM(s) Oral daily  escitalopram 10 milliGRAM(s) Oral daily  losartan 100 milliGRAM(s) Oral daily  melatonin 3 milliGRAM(s) Oral at bedtime  pantoprazole    Tablet 40 milliGRAM(s) Oral before breakfast  sotalol 80 milliGRAM(s) Oral two times a day    MEDICATIONS  (PRN):              SOCIAL HISTORY:    [ x] Non-smoker  [ ] Smoker  [ ] Alcohol      REVIEW OF SYSTEMS:  CONSTITUTIONAL: No fever, weight loss, or fatigue  CARDIOLOGY: PAtient denies chest pain, shortness of breath or syncopal episodes.   RESPIRATORY: denies shortness of breath, wheezeing.   NEUROLOGICAL: NO weakness, no focal deficits to report.  ENDOCRINOLOGICAL: no recent change in diabetic medications.   GI: no BRBPR, no N,V,diarrhea.    PSYCHIATRY: normal mood and affect  HEENT: no nasal discharge, no ecchymosis  SKIN: no ecchymosis, no breakdown  MUSCULOSKELETAL: Full range of motion x4.      PHYSICAL EXAM:  T(C): 36.5 (05-30-22 @ 10:50), Max: 36.9 (05-29-22 @ 22:50)  HR: 107 (05-30-22 @ 07:49) (88 - 135)  BP: 127/70 (05-30-22 @ 10:50) (97/60 - 163/88)  RR: 20 (05-30-22 @ 10:50) (20 - 20)  SpO2: 96% (05-30-22 @ 10:50) (96% - 98%)  Wt(kg): --  I&O's Summary    Daily Height in cm: 182.88 (29 May 2022 17:45)    Daily     General Appearance: Normal	  Cardiovascular: Normal S1 S2, No JVD, No murmurs, No edema  Respiratory: Lungs clear to auscultation	  Psychiatry: A & O x 3, Mood & affect appropriate  Gastrointestinal:  Soft, Non-tender  Skin: No rashes, No ecchymoses, No cyanosis	  Neurologic: Non-focal  Extremities: Normal range of motion, No clubbing, cyanosis or edema  Vascular: Peripheral pulses palpable 2+ bilaterally        LABS:	 	                          14.4   12.43 )-----------( 206      ( 30 May 2022 06:30 )             48.3     05-30    138  |  101  |  20  ----------------------------<  109<H>  5.3<H>   |  25  |  1.1    Ca    9.3      30 May 2022 06:30  Mg     2.2     05-30    TPro  6.1  /  Alb  3.8  /  TBili  0.5  /  DBili  x   /  AST  17  /  ALT  12  /  AlkPhos  74  05-30    CARDIAC MARKERS ( 30 May 2022 06:30 )  x     / <0.01 ng/mL / x     / x     / x      CARDIAC MARKERS ( 29 May 2022 18:57 )  x     / <0.01 ng/mL / x     / x     / x              proBNP: Serum Pro-Brain Natriuretic Peptide: 1962 pg/mL (05-29 @ 19:29)    Lipid Profile:   HgA1c:   TSH:       CARDIAC MARKERS:            TELEMETRY EVENTS: 	    ECG:  	  RADIOLOGY:  OTHER: 	    PREVIOUS DIAGNOSTIC TESTING:    [ ] Echocardiogram:  [ ]  Catheterization:  [ ] Stress Test:  	  	  ASSESSMENT/PLAN:

## 2022-05-31 LAB
ALBUMIN SERPL ELPH-MCNC: 3.8 G/DL — SIGNIFICANT CHANGE UP (ref 3.5–5.2)
ALP SERPL-CCNC: 82 U/L — SIGNIFICANT CHANGE UP (ref 30–115)
ALT FLD-CCNC: 14 U/L — SIGNIFICANT CHANGE UP (ref 0–41)
ANION GAP SERPL CALC-SCNC: 10 MMOL/L — SIGNIFICANT CHANGE UP (ref 7–14)
AST SERPL-CCNC: 17 U/L — SIGNIFICANT CHANGE UP (ref 0–41)
BILIRUB SERPL-MCNC: 0.4 MG/DL — SIGNIFICANT CHANGE UP (ref 0.2–1.2)
BUN SERPL-MCNC: 24 MG/DL — HIGH (ref 10–20)
CALCIUM SERPL-MCNC: 9.5 MG/DL — SIGNIFICANT CHANGE UP (ref 8.5–10.1)
CHLORIDE SERPL-SCNC: 103 MMOL/L — SIGNIFICANT CHANGE UP (ref 98–110)
CO2 SERPL-SCNC: 28 MMOL/L — SIGNIFICANT CHANGE UP (ref 17–32)
CREAT SERPL-MCNC: 1.2 MG/DL — SIGNIFICANT CHANGE UP (ref 0.7–1.5)
EGFR: 63 ML/MIN/1.73M2 — SIGNIFICANT CHANGE UP
GLUCOSE SERPL-MCNC: 132 MG/DL — HIGH (ref 70–99)
HCT VFR BLD CALC: 49.7 % — SIGNIFICANT CHANGE UP (ref 42–52)
HGB BLD-MCNC: 14.6 G/DL — SIGNIFICANT CHANGE UP (ref 14–18)
MAGNESIUM SERPL-MCNC: 2.1 MG/DL — SIGNIFICANT CHANGE UP (ref 1.8–2.4)
MCHC RBC-ENTMCNC: 22.7 PG — LOW (ref 27–31)
MCHC RBC-ENTMCNC: 29.4 G/DL — LOW (ref 32–37)
MCV RBC AUTO: 77.3 FL — LOW (ref 80–94)
NRBC # BLD: 0 /100 WBCS — SIGNIFICANT CHANGE UP (ref 0–0)
PLATELET # BLD AUTO: 211 K/UL — SIGNIFICANT CHANGE UP (ref 130–400)
POTASSIUM SERPL-MCNC: 5.6 MMOL/L — HIGH (ref 3.5–5)
POTASSIUM SERPL-SCNC: 5.6 MMOL/L — HIGH (ref 3.5–5)
PROT SERPL-MCNC: 6.3 G/DL — SIGNIFICANT CHANGE UP (ref 6–8)
RBC # BLD: 6.43 M/UL — HIGH (ref 4.7–6.1)
RBC # FLD: 19.4 % — HIGH (ref 11.5–14.5)
SODIUM SERPL-SCNC: 141 MMOL/L — SIGNIFICANT CHANGE UP (ref 135–146)
WBC # BLD: 11.61 K/UL — HIGH (ref 4.8–10.8)
WBC # FLD AUTO: 11.61 K/UL — HIGH (ref 4.8–10.8)

## 2022-05-31 PROCEDURE — 99232 SBSQ HOSP IP/OBS MODERATE 35: CPT

## 2022-05-31 PROCEDURE — 99233 SBSQ HOSP IP/OBS HIGH 50: CPT

## 2022-05-31 PROCEDURE — 93306 TTE W/DOPPLER COMPLETE: CPT | Mod: 26

## 2022-05-31 RX ORDER — DILTIAZEM HCL 120 MG
10 CAPSULE, EXT RELEASE 24 HR ORAL ONCE
Refills: 0 | Status: COMPLETED | OUTPATIENT
Start: 2022-05-31 | End: 2022-05-31

## 2022-05-31 RX ORDER — SIMETHICONE 80 MG/1
80 TABLET, CHEWABLE ORAL DAILY
Refills: 0 | Status: DISCONTINUED | OUTPATIENT
Start: 2022-05-31 | End: 2022-06-02

## 2022-05-31 RX ORDER — DILTIAZEM HCL 120 MG
60 CAPSULE, EXT RELEASE 24 HR ORAL EVERY 6 HOURS
Refills: 0 | Status: DISCONTINUED | OUTPATIENT
Start: 2022-05-31 | End: 2022-06-01

## 2022-05-31 RX ADMIN — AMIODARONE HYDROCHLORIDE 400 MILLIGRAM(S): 400 TABLET ORAL at 05:55

## 2022-05-31 RX ADMIN — APIXABAN 5 MILLIGRAM(S): 2.5 TABLET, FILM COATED ORAL at 05:55

## 2022-05-31 RX ADMIN — PANTOPRAZOLE SODIUM 40 MILLIGRAM(S): 20 TABLET, DELAYED RELEASE ORAL at 06:54

## 2022-05-31 RX ADMIN — Medication 60 MILLIGRAM(S): at 11:05

## 2022-05-31 RX ADMIN — Medication 60 MILLIGRAM(S): at 18:02

## 2022-05-31 RX ADMIN — Medication 3 MILLIGRAM(S): at 22:11

## 2022-05-31 RX ADMIN — Medication 60 MILLIGRAM(S): at 23:08

## 2022-05-31 RX ADMIN — Medication 30 MILLIGRAM(S): at 05:55

## 2022-05-31 RX ADMIN — Medication 10 MILLIGRAM(S): at 08:48

## 2022-05-31 RX ADMIN — SIMETHICONE 80 MILLIGRAM(S): 80 TABLET, CHEWABLE ORAL at 20:59

## 2022-05-31 RX ADMIN — LOSARTAN POTASSIUM 100 MILLIGRAM(S): 100 TABLET, FILM COATED ORAL at 05:55

## 2022-05-31 RX ADMIN — Medication 30 MILLIGRAM(S): at 00:00

## 2022-05-31 RX ADMIN — AMIODARONE HYDROCHLORIDE 400 MILLIGRAM(S): 400 TABLET ORAL at 17:56

## 2022-05-31 RX ADMIN — Medication 10 MILLIGRAM(S): at 19:25

## 2022-05-31 RX ADMIN — ESCITALOPRAM OXALATE 10 MILLIGRAM(S): 10 TABLET, FILM COATED ORAL at 17:56

## 2022-05-31 RX ADMIN — DIVALPROEX SODIUM 250 MILLIGRAM(S): 500 TABLET, DELAYED RELEASE ORAL at 17:57

## 2022-05-31 RX ADMIN — APIXABAN 5 MILLIGRAM(S): 2.5 TABLET, FILM COATED ORAL at 18:04

## 2022-05-31 RX ADMIN — ATORVASTATIN CALCIUM 40 MILLIGRAM(S): 80 TABLET, FILM COATED ORAL at 22:11

## 2022-06-01 LAB
ALBUMIN SERPL ELPH-MCNC: 3.8 G/DL — SIGNIFICANT CHANGE UP (ref 3.5–5.2)
ALP SERPL-CCNC: 86 U/L — SIGNIFICANT CHANGE UP (ref 30–115)
ALT FLD-CCNC: 13 U/L — SIGNIFICANT CHANGE UP (ref 0–41)
ANION GAP SERPL CALC-SCNC: 14 MMOL/L — SIGNIFICANT CHANGE UP (ref 7–14)
AST SERPL-CCNC: 16 U/L — SIGNIFICANT CHANGE UP (ref 0–41)
BILIRUB SERPL-MCNC: 0.4 MG/DL — SIGNIFICANT CHANGE UP (ref 0.2–1.2)
BUN SERPL-MCNC: 23 MG/DL — HIGH (ref 10–20)
CALCIUM SERPL-MCNC: 9 MG/DL — SIGNIFICANT CHANGE UP (ref 8.5–10.1)
CHLORIDE SERPL-SCNC: 101 MMOL/L — SIGNIFICANT CHANGE UP (ref 98–110)
CO2 SERPL-SCNC: 25 MMOL/L — SIGNIFICANT CHANGE UP (ref 17–32)
CREAT SERPL-MCNC: 1.1 MG/DL — SIGNIFICANT CHANGE UP (ref 0.7–1.5)
EGFR: 70 ML/MIN/1.73M2 — SIGNIFICANT CHANGE UP
GLUCOSE SERPL-MCNC: 107 MG/DL — HIGH (ref 70–99)
HCT VFR BLD CALC: 47.8 % — SIGNIFICANT CHANGE UP (ref 42–52)
HGB BLD-MCNC: 14.1 G/DL — SIGNIFICANT CHANGE UP (ref 14–18)
MAGNESIUM SERPL-MCNC: 2 MG/DL — SIGNIFICANT CHANGE UP (ref 1.8–2.4)
MCHC RBC-ENTMCNC: 22.5 PG — LOW (ref 27–31)
MCHC RBC-ENTMCNC: 29.5 G/DL — LOW (ref 32–37)
MCV RBC AUTO: 76.2 FL — LOW (ref 80–94)
NRBC # BLD: 0 /100 WBCS — SIGNIFICANT CHANGE UP (ref 0–0)
PLATELET # BLD AUTO: 233 K/UL — SIGNIFICANT CHANGE UP (ref 130–400)
POTASSIUM SERPL-MCNC: 4.8 MMOL/L — SIGNIFICANT CHANGE UP (ref 3.5–5)
POTASSIUM SERPL-SCNC: 4.8 MMOL/L — SIGNIFICANT CHANGE UP (ref 3.5–5)
PROT SERPL-MCNC: 6.4 G/DL — SIGNIFICANT CHANGE UP (ref 6–8)
RBC # BLD: 6.27 M/UL — HIGH (ref 4.7–6.1)
RBC # FLD: 19.3 % — HIGH (ref 11.5–14.5)
SODIUM SERPL-SCNC: 140 MMOL/L — SIGNIFICANT CHANGE UP (ref 135–146)
WBC # BLD: 11.68 K/UL — HIGH (ref 4.8–10.8)
WBC # FLD AUTO: 11.68 K/UL — HIGH (ref 4.8–10.8)

## 2022-06-01 PROCEDURE — 99233 SBSQ HOSP IP/OBS HIGH 50: CPT

## 2022-06-01 PROCEDURE — 92960 CARDIOVERSION ELECTRIC EXT: CPT

## 2022-06-01 RX ORDER — DILTIAZEM HCL 120 MG
90 CAPSULE, EXT RELEASE 24 HR ORAL EVERY 6 HOURS
Refills: 0 | Status: DISCONTINUED | OUTPATIENT
Start: 2022-06-01 | End: 2022-06-02

## 2022-06-01 RX ORDER — AMIODARONE HYDROCHLORIDE 400 MG/1
150 TABLET ORAL ONCE
Refills: 0 | Status: COMPLETED | OUTPATIENT
Start: 2022-06-01 | End: 2022-06-01

## 2022-06-01 RX ORDER — ACETAMINOPHEN 500 MG
975 TABLET ORAL EVERY 6 HOURS
Refills: 0 | Status: DISCONTINUED | OUTPATIENT
Start: 2022-06-01 | End: 2022-06-02

## 2022-06-01 RX ADMIN — ATORVASTATIN CALCIUM 40 MILLIGRAM(S): 80 TABLET, FILM COATED ORAL at 22:24

## 2022-06-01 RX ADMIN — Medication 3 MILLIGRAM(S): at 22:24

## 2022-06-01 RX ADMIN — Medication 90 MILLIGRAM(S): at 18:30

## 2022-06-01 RX ADMIN — APIXABAN 5 MILLIGRAM(S): 2.5 TABLET, FILM COATED ORAL at 06:49

## 2022-06-01 RX ADMIN — AMIODARONE HYDROCHLORIDE 600 MILLIGRAM(S): 400 TABLET ORAL at 11:57

## 2022-06-01 RX ADMIN — PANTOPRAZOLE SODIUM 40 MILLIGRAM(S): 20 TABLET, DELAYED RELEASE ORAL at 06:49

## 2022-06-01 RX ADMIN — APIXABAN 5 MILLIGRAM(S): 2.5 TABLET, FILM COATED ORAL at 18:28

## 2022-06-01 RX ADMIN — Medication 90 MILLIGRAM(S): at 11:59

## 2022-06-01 RX ADMIN — ESCITALOPRAM OXALATE 10 MILLIGRAM(S): 10 TABLET, FILM COATED ORAL at 18:28

## 2022-06-01 RX ADMIN — Medication 60 MILLIGRAM(S): at 06:49

## 2022-06-01 RX ADMIN — LOSARTAN POTASSIUM 100 MILLIGRAM(S): 100 TABLET, FILM COATED ORAL at 06:49

## 2022-06-01 RX ADMIN — DIVALPROEX SODIUM 250 MILLIGRAM(S): 500 TABLET, DELAYED RELEASE ORAL at 18:28

## 2022-06-01 RX ADMIN — AMIODARONE HYDROCHLORIDE 400 MILLIGRAM(S): 400 TABLET ORAL at 06:49

## 2022-06-01 RX ADMIN — AMIODARONE HYDROCHLORIDE 400 MILLIGRAM(S): 400 TABLET ORAL at 18:29

## 2022-06-01 RX ADMIN — Medication 975 MILLIGRAM(S): at 22:23

## 2022-06-01 NOTE — ED ADULT NURSE NOTE - CHIEF COMPLAINT QUOTE
patient reports sob and cough for last few days. patient went to [facility] noted to be in rapid afib patient given 25mg of cardizem. reports relief of symptoms

## 2022-06-01 NOTE — PROGRESS NOTE ADULT - SUBJECTIVE AND OBJECTIVE BOX
TRELL ISRAEL  76y, Male  Allergy: No Known Allergies    Hospital Day: 1d    Patient seen and examined earlier today. reports having SOB. no chest pain.      PMH/PSH:  PAST MEDICAL & SURGICAL HISTORY:  HTN (hypertension)      Afib      Squamous cell carcinoma  scalp      Dyslipidemia      Gastritis      Malaria      Hernia, inguinal, bilateral      History of squamous cell carcinoma  scalp , moh&#x27;s procedure          LAST 24-Hr EVENTS:    VITALS:  T(F): 97.7 (05-30-22 @ 10:50), Max: 98.5 (05-29-22 @ 22:50)  HR: 107 (05-30-22 @ 07:49)  BP: 127/70 (05-30-22 @ 10:50) (97/60 - 163/88)  RR: 20 (05-30-22 @ 10:50)  SpO2: 96% (05-30-22 @ 10:50)          TESTS & MEASUREMENTS:  Weight/BMI  99.8 (05-29-22 @ 17:45)  29.8 (05-29-22 @ 17:45)                          14.4   12.43 )-----------( 206      ( 30 May 2022 06:30 )             48.3         05-30    138  |  101  |  20  ----------------------------<  109<H>  5.3<H>   |  25  |  1.1    Ca    9.3      30 May 2022 06:30  Mg     2.2     05-30    TPro  6.1  /  Alb  3.8  /  TBili  0.5  /  DBili  x   /  AST  17  /  ALT  12  /  AlkPhos  74  05-30    LIVER FUNCTIONS - ( 30 May 2022 06:30 )  Alb: 3.8 g/dL / Pro: 6.1 g/dL / ALK PHOS: 74 U/L / ALT: 12 U/L / AST: 17 U/L / GGT: x           CARDIAC MARKERS ( 30 May 2022 06:30 )  x     / <0.01 ng/mL / x     / x     / x      CARDIAC MARKERS ( 29 May 2022 18:57 )  x     / <0.01 ng/mL / x     / x     / x                    Serum Pro-Brain Natriuretic Peptide: 1962 pg/mL (05-29-22 @ 19:29)    COVID-19 PCR: NotDetec (05-29-22 @ 22:04)                RADIOLOGY, ECG, & ADDITIONAL TESTS:  12 Lead ECG:   Ventricular Rate 96 BPM    QRS Duration 70 ms    Q-T Interval 294 ms    QTC Calculation(Bazett) 371 ms    R Axis 21 degrees    T Axis 40 degrees    Diagnosis Line Atrial fibrillation  Abnormal ECG    Confirmed by Rolando Arredondo ([id number]) on 5/29/2022 7:24:35 PM (05-29-22 @ 17:50)      RECENT DIAGNOSTIC ORDERS:  Magnesium, Serum: AM Sched. Collection: 31-May-2022 04:30 (05-30-22 @ 08:00)  Comprehensive Metabolic Panel: AM Sched. Collection: 31-May-2022 04:30 (05-30-22 @ 08:00)  Complete Blood Count: AM Sched. Collection: 31-May-2022 04:30 (05-30-22 @ 08:00)  Diet, DASH/TLC:   Sodium & Cholesterol Restricted (05-29-22 @ 23:48)  TTE Echo Complete w/o Contrast w/ Doppler:   Transport: Stretcher-Crib  Monitor: w/o Monitor (05-29-22 @ 23:48)  Add On Test-Specimen in Lab: STAT, - bnp  Specimen In Lab. Enter name of test required in Special Instructions.  This is for informational purposes only and will not receive results. Laboratory staff will enter new orders for the requested add on test and that order will receive results. (05-29-22 @ 19:28)      MEDICATIONS:  MEDICATIONS  (STANDING):  aMIOdarone    Tablet 400 milliGRAM(s) Oral two times a day  apixaban 5 milliGRAM(s) Oral every 12 hours  atorvastatin 40 milliGRAM(s) Oral at bedtime  diltiazem    Tablet 30 milliGRAM(s) Oral every 6 hours  diVALproex  milliGRAM(s) Oral daily  escitalopram 10 milliGRAM(s) Oral daily  losartan 100 milliGRAM(s) Oral daily  melatonin 3 milliGRAM(s) Oral at bedtime  pantoprazole    Tablet 40 milliGRAM(s) Oral before breakfast    MEDICATIONS  (PRN):      HOME MEDICATIONS:  amLODIPine 5 mg oral tablet (05-29)  Depakote 250 mg oral delayed release tablet (08-31)  Eliquis 5 mg oral tablet (08-31)  escitalopram 10 mg oral tablet (05-29)  losartan 100 mg oral tablet (05-29)  pantoprazole 40 mg oral delayed release tablet (05-29)  rosuvastatin 10 mg oral tablet (08-31)  sotalol 80 mg oral tablet (08-31)      PHYSICAL EXAM:  General: WN/WD NAD  Neurology: A&Ox3, nonfocal, follows commands  Eyes: PERRLA/ EOMI  ENT/Neck: Neck supple, trachea midline, No JVD  Respiratory: CTA B/L, No wheezing, rales, rhonchi,(+) intermittent cough  CV:  irregular rate and rhythm, S1S2, no murmurs, rubs or gallops  Abdominal: Soft, NT, ND +BS,   Extremities: No edema, + peripheral pulses  Skin: No Rashes, Hematoma, Ecchymosis      
TRELL ISRAEL 76y Male  MRN#: [id number]   Hospital Day: 3d    SUBJECTIVE  Patient is a 76y old Male who presents with a chief complaint of Palpitations (01 Jun 2022 12:56)  Currently admitted to medicine with the primary diagnosis of Shortness of breath      INTERVAL HPI AND OVERNIGHT EVENTS:  Patient was examined and seen at bedside. This morning he is resting comfortably in bed and reports no issues or overnight events.    REVIEW OF SYMPTOMS:  CONSTITUTIONAL: No weakness, fevers or chills; No headaches  EYES: No visual changes, eye pain, or discharge  ENT: No vertigo; No ear pain or change in hearing; No sore throat or difficulty swallowing  NECK: No pain or stiffness  RESPIRATORY: No cough, wheezing, or hemoptysis; No shortness of breath  CARDIOVASCULAR: No chest pain or palpitations  GASTROINTESTINAL: No abdominal or epigastric pain; No nausea, vomiting, or hematemesis; No diarrhea or constipation; No melena or hematochezia  GENITOURINARY: No dysuria, frequency or hematuria  MUSCULOSKELETAL: No joint pain, no muscle pain, no weakness  NEUROLOGICAL: No numbness or weakness  SKIN: No itching or rashes    OBJECTIVE  PAST MEDICAL & SURGICAL HISTORY  HTN (hypertension)    Afib    Squamous cell carcinoma  scalp    Dyslipidemia    Gastritis    Malaria    Hernia, inguinal, bilateral    History of squamous cell carcinoma  scalp , moh&#x27;s procedure      ALLERGIES:  No Known Allergies    MEDICATIONS:  STANDING MEDICATIONS  aMIOdarone    Tablet 400 milliGRAM(s) Oral two times a day  apixaban 5 milliGRAM(s) Oral every 12 hours  atorvastatin 40 milliGRAM(s) Oral at bedtime  diltiazem    Tablet 90 milliGRAM(s) Oral every 6 hours  diVALproex  milliGRAM(s) Oral daily  escitalopram 10 milliGRAM(s) Oral daily  losartan 100 milliGRAM(s) Oral daily  melatonin 3 milliGRAM(s) Oral at bedtime  pantoprazole    Tablet 40 milliGRAM(s) Oral before breakfast    PRN MEDICATIONS  simethicone 80 milliGRAM(s) Chew daily PRN      VITAL SIGNS: Last 24 Hours  T(C): 36.4 (01 Jun 2022 07:42), Max: 36.7 (31 May 2022 23:54)  T(F): 97.5 (01 Jun 2022 07:25), Max: 98 (31 May 2022 23:54)  HR: 74 (01 Jun 2022 12:01) (74 - 140)  BP: 110/69 (01 Jun 2022 12:01) (110/69 - 131/76)  BP(mean): --  RR: 18 (01 Jun 2022 12:01) (18 - 18)  SpO2: 96% (01 Jun 2022 12:01) (92% - 99%)    LABS:                        14.1   11.68 )-----------( 233      ( 01 Jun 2022 07:42 )             47.8     06-01    140  |  101  |  23<H>  ----------------------------<  107<H>  4.8   |  25  |  1.1    Ca    9.0      01 Jun 2022 07:42  Mg     2.0     06-01    TPro  6.4  /  Alb  3.8  /  TBili  0.4  /  DBili  x   /  AST  16  /  ALT  13  /  AlkPhos  86  06-01                  RADIOLOGY:      PHYSICAL EXAM:  CONSTITUTIONAL: No acute distress, well-developed, well-groomed, AAOx3  HEAD: Atraumatic, normocephalic  EYES: EOM intact, PERRLA, conjunctiva and sclera clear  ENT: Supple, no masses, no thyromegaly, no bruits, no JVD; moist mucous membranes  PULMONARY: Clear to auscultation bilaterally; no wheezes, rales, or rhonchi  CARDIOVASCULAR: Regular rate and rhythm; no murmurs, rubs, or gallops  GASTROINTESTINAL: Soft, non-tender, non-distended; bowel sounds present  MUSCULOSKELETAL: 2+ peripheral pulses; no clubbing, no cyanosis, no edema  NEUROLOGY: non-focal  SKIN: No rashes or lesions; warm and dry    
SUBJ: Short of breath      MEDICATIONS  (STANDING):  aMIOdarone    Tablet 400 milliGRAM(s) Oral two times a day  apixaban 5 milliGRAM(s) Oral every 12 hours  atorvastatin 40 milliGRAM(s) Oral at bedtime  diltiazem    Tablet 60 milliGRAM(s) Oral every 6 hours  diVALproex  milliGRAM(s) Oral daily  escitalopram 10 milliGRAM(s) Oral daily  losartan 100 milliGRAM(s) Oral daily  melatonin 3 milliGRAM(s) Oral at bedtime  pantoprazole    Tablet 40 milliGRAM(s) Oral before breakfast    MEDICATIONS  (PRN):            Vital Signs Last 24 Hrs  T(C): 36.5 (31 May 2022 07:30), Max: 36.5 (30 May 2022 10:50)  T(F): 97.7 (31 May 2022 07:30), Max: 97.7 (30 May 2022 10:50)  HR: 126 (31 May 2022 07:56) (110 - 140)  BP: 120/81 (31 May 2022 07:30) (120/81 - 140/82)  BP(mean): --  RR: 18 (31 May 2022 07:30) (18 - 20)  SpO2: 99% (31 May 2022 07:30) (96% - 99%)     REVIEW OF SYSTEMS:  CONSTITUTIONAL: No fever, weight loss, or fatigue  CARDIOLOGY: PAtient denies chest pain, shortness of breath or syncopal episodes.   RESPIRATORY: denies shortness of breath, wheezeing.   NEUROLOGICAL: NO weakness, no focal deficits to report.  ENDOCRINOLOGICAL: no recent change in diabetic medications.   GI: no BRBPR, no N,V,diarrhea.    PSYCHIATRY: normal mood and affect  HEENT: no nasal discharge, no ecchymosis  SKIN: no ecchymosis, no breakdown  MUSCULOSKELETAL: Full range of motion x4.        PHYSICAL EXAM:  · CONSTITUTIONAL:	Well-developed, well nourished    BMI-  ·RESPIRATORY:   airway patent; breath sounds equal; good air movement; respirations non-labored; clear to auscultation bilaterally; no chest wall tenderness; no intercostal retractions; no rales,rhonchi or wheeze  · CARDIOVASCULAR	regular rate and rhythm  no rub  no murmur  normal PMI  · EXTREMITIES: No cyanosis, clubbing or edema  · VASCULAR: 	Equal and normal pulses (carotid, femoral, dorsalis pedis)  	  TELEMETRY:    ECG:    TTE:    LABS:                        14.6   11.61 )-----------( 211      ( 31 May 2022 07:27 )             49.7     05-31    141  |  103  |  24<H>  ----------------------------<  132<H>  5.6<H>   |  28  |  1.2    Ca    9.5      31 May 2022 07:27  Mg     2.1     05-31    TPro  6.3  /  Alb  3.8  /  TBili  0.4  /  DBili  x   /  AST  17  /  ALT  14  /  AlkPhos  82  05-31    CARDIAC MARKERS ( 30 May 2022 06:30 )  x     / <0.01 ng/mL / x     / x     / x      CARDIAC MARKERS ( 29 May 2022 18:57 )  x     / <0.01 ng/mL / x     / x     / x              I&O's Summary    BNP  RADIOLOGY & ADDITIONAL STUDIES:    IMPRESSION AND PLAN:      Still in Afib  continue with Amiodarone PO and DOACs  NPO after Midnight for NADINE cardioversion      
TRELL ISRAEL  76y, Male  Allergy: No Known Allergies    Hospital Day: 3d    Patient seen and examined earlier today. He is feeling better today but still w/ palpitations. 's    PMH/PSH:  PAST MEDICAL & SURGICAL HISTORY:    HTN (hypertension)  Chronic Afib  Squamous cell carcinoma scalp  Dyslipidemia  Gastritis  Malaria  Hernia, inguinal, bilateral      History of squamous cell carcinoma  scalp , moh&#x27;s procedure    LAST 24-Hr EVENTS: no events     VITALS:    T(C): 36.4 (01 Jun 2022 07:42), Max: 36.7 (31 May 2022 23:54)  T(F): 97.5 (01 Jun 2022 07:25), Max: 98 (31 May 2022 23:54)  HR: 74 (01 Jun 2022 12:01) (74 - 140)  BP: 110/69 (01 Jun 2022 12:01) (110/69 - 131/76)  RR: 18 (01 Jun 2022 12:01) (18 - 18)  SpO2: 96% (01 Jun 2022 12:01) (92% - 99%)    PHYSICAL EXAM:  GENERAL: NAD, well-developed  HEAD:  Atraumatic, Normocephalic  NECK: Supple, No JVD  CHEST/LUNG: Clear to auscultation bilaterally; No wheeze  HEART: Irregularly Irregular, No murmurs, rubs, or gallops  ABDOMEN: Soft, Nontender, Nondistended; Bowel sounds present  EXTREMITIES:  2+ Peripheral Pulses, No clubbing, cyanosis, or edema  PSYCH: AAOx3  NEUROLOGY: non-focal  SKIN: No rashes or lesions    TESTS & MEASUREMENTS:  Weight/BMI  99.8 (05-29-22 @ 17:45)  29.8 (05-29-22 @ 17:45)                        14.1   11.68 )-----------( 233      ( 01 Jun 2022 07:42 )             47.8     06-01    140  |  101  |  23<H>  ----------------------------<  107<H>  4.8   |  25  |  1.1    Ca    9.0      01 Jun 2022 07:42  Mg     2.0     06-01    TPro  6.4  /  Alb  3.8  /  TBili  0.4  /  DBili  x   /  AST  16  /  ALT  13  /  AlkPhos  86  06-01       LIVER FUNCTIONS - ( 31 May 2022 07:27 )  Alb: 3.8 g/dL / Pro: 6.3 g/dL / ALK PHOS: 82 U/L / ALT: 14 U/L / AST: 17 U/L / GGT: x           CARDIAC MARKERS ( 30 May 2022 06:30 )  x     / <0.01 ng/mL / x     / x     / x      CARDIAC MARKERS ( 29 May 2022 18:57 )  x     / <0.01 ng/mL / x     / x     / x        Serum Pro-Brain Natriuretic Peptide: 1962 pg/mL (05-29-22 @ 19:29)    COVID-19 PCR: NotDetec (05-29-22 @ 22:04)    RADIOLOGY, ECG, & ADDITIONAL TESTS:  12 Lead ECG:   Ventricular Rate 127 BPM    QRS Duration 66 ms    Q-T Interval 288 ms    QTC Calculation(Bazett) 418 ms    R Axis 43 degrees    T Axis -12 degrees    Diagnosis Line Atrial fibrillation with rapid ventricular response  Abnormal ECG    Confirmed by Rolando Arredondo ([id number]) on 5/30/2022 9:57:04 PM (05-29-22 @ 19:37)      RECENT DIAGNOSTIC ORDERS:  Magnesium, Serum: AM Sched. Collection: 01-Jun-2022 04:30 (05-31-22 @ 10:00)  Comprehensive Metabolic Panel: AM Sched. Collection: 01-Jun-2022 04:30 (05-31-22 @ 10:00)  Complete Blood Count: AM Sched. Collection: 01-Jun-2022 04:30 (05-31-22 @ 10:00)      MEDICATIONS:  aMIOdarone    Tablet 400 milliGRAM(s) Oral two times a day  apixaban 5 milliGRAM(s) Oral every 12 hours  atorvastatin 40 milliGRAM(s) Oral at bedtime  diltiazem    Tablet 90 milliGRAM(s) Oral every 6 hours  diVALproex  milliGRAM(s) Oral daily  escitalopram 10 milliGRAM(s) Oral daily  losartan 100 milliGRAM(s) Oral daily  melatonin 3 milliGRAM(s) Oral at bedtime  pantoprazole    Tablet 40 milliGRAM(s) Oral before breakfast    MEDICATIONS  (PRN):  simethicone 80 milliGRAM(s) Chew daily PRN Dyspepsia      HOME MEDICATIONS:  amLODIPine 5 mg oral tablet (05-29)  Depakote 250 mg oral delayed release tablet (08-31)  Eliquis 5 mg oral tablet (08-31)  escitalopram 10 mg oral tablet (05-29)  losartan 100 mg oral tablet (05-29)  pantoprazole 40 mg oral delayed release tablet (05-29)  rosuvastatin 10 mg oral tablet (08-31)  sotalol 80 mg oral tablet (08-31)        
TRELL ISRAEL  76y, Male  Allergy: No Known Allergies    Hospital Day: 2d    Patient seen and examined earlier today.     PMH/PSH:  PAST MEDICAL & SURGICAL HISTORY:  HTN (hypertension)      Afib      Squamous cell carcinoma  scalp      Dyslipidemia      Gastritis      Malaria      Hernia, inguinal, bilateral      History of squamous cell carcinoma  scalp , moh&#x27;s procedure          LAST 24-Hr EVENTS:    VITALS:  T(F): 97.7 (05-31-22 @ 07:30), Max: 97.7 (05-31-22 @ 07:30)  HR: 126 (05-31-22 @ 07:56)  BP: 120/81 (05-31-22 @ 07:30) (120/81 - 140/82)  RR: 18 (05-31-22 @ 07:30)  SpO2: 99% (05-31-22 @ 07:30)          TESTS & MEASUREMENTS:  Weight/BMI  99.8 (05-29-22 @ 17:45)  29.8 (05-29-22 @ 17:45)                          14.6   11.61 )-----------( 211      ( 31 May 2022 07:27 )             49.7         05-31    141  |  103  |  24<H>  ----------------------------<  132<H>  5.6<H>   |  28  |  1.2    Ca    9.5      31 May 2022 07:27  Mg     2.1     05-31    TPro  6.3  /  Alb  3.8  /  TBili  0.4  /  DBili  x   /  AST  17  /  ALT  14  /  AlkPhos  82  05-31    LIVER FUNCTIONS - ( 31 May 2022 07:27 )  Alb: 3.8 g/dL / Pro: 6.3 g/dL / ALK PHOS: 82 U/L / ALT: 14 U/L / AST: 17 U/L / GGT: x           CARDIAC MARKERS ( 30 May 2022 06:30 )  x     / <0.01 ng/mL / x     / x     / x      CARDIAC MARKERS ( 29 May 2022 18:57 )  x     / <0.01 ng/mL / x     / x     / x                    Serum Pro-Brain Natriuretic Peptide: 1962 pg/mL (05-29-22 @ 19:29)    COVID-19 PCR: NotDetec (05-29-22 @ 22:04)                RADIOLOGY, ECG, & ADDITIONAL TESTS:  12 Lead ECG:   Ventricular Rate 127 BPM    QRS Duration 66 ms    Q-T Interval 288 ms    QTC Calculation(Bazett) 418 ms    R Axis 43 degrees    T Axis -12 degrees    Diagnosis Line Atrial fibrillation with rapid ventricular response  Abnormal ECG    Confirmed by Rolando Arredondo ([id number]) on 5/30/2022 9:57:04 PM (05-29-22 @ 19:37)      RECENT DIAGNOSTIC ORDERS:  Magnesium, Serum: AM Sched. Collection: 01-Jun-2022 04:30 (05-31-22 @ 10:00)  Comprehensive Metabolic Panel: AM Sched. Collection: 01-Jun-2022 04:30 (05-31-22 @ 10:00)  Complete Blood Count: AM Sched. Collection: 01-Jun-2022 04:30 (05-31-22 @ 10:00)      MEDICATIONS:  MEDICATIONS  (STANDING):  aMIOdarone    Tablet 400 milliGRAM(s) Oral two times a day  apixaban 5 milliGRAM(s) Oral every 12 hours  atorvastatin 40 milliGRAM(s) Oral at bedtime  diltiazem    Tablet 60 milliGRAM(s) Oral every 6 hours  diVALproex  milliGRAM(s) Oral daily  escitalopram 10 milliGRAM(s) Oral daily  losartan 100 milliGRAM(s) Oral daily  melatonin 3 milliGRAM(s) Oral at bedtime  pantoprazole    Tablet 40 milliGRAM(s) Oral before breakfast    MEDICATIONS  (PRN):      HOME MEDICATIONS:  amLODIPine 5 mg oral tablet (05-29)  Depakote 250 mg oral delayed release tablet (08-31)  Eliquis 5 mg oral tablet (08-31)  escitalopram 10 mg oral tablet (05-29)  losartan 100 mg oral tablet (05-29)  pantoprazole 40 mg oral delayed release tablet (05-29)  rosuvastatin 10 mg oral tablet (08-31)  sotalol 80 mg oral tablet (08-31)      PHYSICAL EXAM:  General: WN/WD NAD  Neurology: A&Ox3, nonfocal, follows commands  Eyes: PERRLA/ EOMI  ENT/Neck: Neck supple, trachea midline, No JVD  Respiratory: CTA B/L, No wheezing, rales, rhonchi,(+) intermittent cough  CV:  irregular rate and rhythm, S1S2, no murmurs, rubs or gallops  Abdominal: Soft, NT, ND +BS,   Extremities: No edema, + peripheral pulses  Skin: No Rashes, Hematoma, Ecchymosis

## 2022-06-01 NOTE — PROGRESS NOTE ADULT - REASON FOR ADMISSION
Afib
Patient is a 76y old  Male who presents with a chief complaint of Afib (30 May 2022 11:37)
Palpitations
Patient is a 76y old  Male who presents with a chief complaint of Afib (30 May 2022 11:37)

## 2022-06-01 NOTE — PATIENT PROFILE ADULT - FALL HARM RISK - HARM RISK INTERVENTIONS
Assistance with ambulation/Communicate Risk of Fall with Harm to all staff/Monitor gait and stability/Reinforce activity limits and safety measures with patient and family/Reorient to person, place and time as needed/Sit up slowly, dangle for a short time, stand at bedside before walking/Tailored Fall Risk Interventions/Use of alarms - bed, chair and/or voice tab/Visual Cue: Yellow wristband and red socks/Bed in lowest position, wheels locked, appropriate side rails in place/Call bell, personal items and telephone in reach/Instruct patient to call for assistance before getting out of bed or chair/Non-slip footwear when patient is out of bed/[place] to call system/Physically safe environment - no spills, clutter or unnecessary equipment/Purposeful Proactive Rounding/Room/bathroom lighting operational, light cord in reach

## 2022-06-01 NOTE — PATIENT PROFILE ADULT - FUNCTIONAL SCREEN CURRENT LEVEL: SWALLOWING (IF SCORE 2 OR MORE FOR ANY ITEM, CONSULT REHAB SERVICES), MLM)
Problem: DISCHARGE BARRIERS  Goal: Patient's continuum of care needs are met  Outcome: Ongoing  Patient from home with his mother and son. Discharge plan to be determined pending medical course. See SW note. 0 = swallows foods/liquids without difficulty

## 2022-06-01 NOTE — CHART NOTE - NSCHARTNOTEFT_GEN_A_CORE
POST OPERATIVE PROCEDURAL DOCUMENTATION  PRE-OP DIAGNOSIS:  AF      POST-OP DIAGNOSIS:  AF s/p successful CV    PROCEDURE: Transesophageal echocardiogram    Primary Physician:  Dr. Issa  Assistant: Dr. Bridges    ANESTHESIA TYPE  [  ] General Anesthesia  [ x ] Conscious Sedation  [  ] Local/Regional    CONDITION  [  ] Critical  [  ] Serious  [  ] Fair  [ x ] Good    SPECIMENS REMOVED (IF APPLICABLE): N/A    IMPLANTS (IF APPLICABLE): None    ESTIMATED BLOOD LOSS: None    COMPLICATIONS: None      FINDINGS:    After risks and benefits of procedures were explained, informed consent was obtained and placed in chart. Refer to Anesthesia note for sedation details.  The NADINE probe was passed into the esophagus without difficulty.  Transesophageal and transgastric images were obtained.  The NADINE probe was removed without difficulty and examined.  There was no evidence for bleeding.  The patient tolerated the procedure well without any immediate NADINE-related complications.      Preliminary Findings:  LA:   enlarged  CARLA: Left atrial appendage was clear of clot and smoke.  LV: LVEF normal  MV: trace/mild MR, no evidence of MS.   AV: trace AI, no evidence of AS.   RA:  TV: mild-mod TR.   PV: no PI.   IAS: no PFO. No R-> L shunt.   Aorta:  simple atheroma of aortic arch / desc aorta      Patient successfully converted to sinus rhythm with synchronized  __200_ J of direct current cardioversion.  Post cardioversion pt had transient short run of parox AF but subsequently stayed in NSR.       DIAGNOSIS/IMPRESSION:    PLAN OF CARE:  return to floor  f/u with cardiologist outpt  Give extra dose of IV amiodarone 150mg push over 10mg x 1 dose after arrival to floor  On dc,  please send pt on amiodarone 400mg bid x 1 week, followed by 200mg once daily.

## 2022-06-02 ENCOUNTER — TRANSCRIPTION ENCOUNTER (OUTPATIENT)
Age: 77
End: 2022-06-02

## 2022-06-02 VITALS
TEMPERATURE: 97 F | SYSTOLIC BLOOD PRESSURE: 165 MMHG | RESPIRATION RATE: 18 BRPM | HEART RATE: 70 BPM | DIASTOLIC BLOOD PRESSURE: 69 MMHG

## 2022-06-02 LAB
ALBUMIN SERPL ELPH-MCNC: 3.6 G/DL — SIGNIFICANT CHANGE UP (ref 3.5–5.2)
ALP SERPL-CCNC: 77 U/L — SIGNIFICANT CHANGE UP (ref 30–115)
ALT FLD-CCNC: 13 U/L — SIGNIFICANT CHANGE UP (ref 0–41)
ANION GAP SERPL CALC-SCNC: 12 MMOL/L — SIGNIFICANT CHANGE UP (ref 7–14)
AST SERPL-CCNC: 16 U/L — SIGNIFICANT CHANGE UP (ref 0–41)
BILIRUB SERPL-MCNC: 0.4 MG/DL — SIGNIFICANT CHANGE UP (ref 0.2–1.2)
BUN SERPL-MCNC: 20 MG/DL — SIGNIFICANT CHANGE UP (ref 10–20)
CALCIUM SERPL-MCNC: 8.7 MG/DL — SIGNIFICANT CHANGE UP (ref 8.5–10.1)
CHLORIDE SERPL-SCNC: 100 MMOL/L — SIGNIFICANT CHANGE UP (ref 98–110)
CO2 SERPL-SCNC: 28 MMOL/L — SIGNIFICANT CHANGE UP (ref 17–32)
CREAT SERPL-MCNC: 1 MG/DL — SIGNIFICANT CHANGE UP (ref 0.7–1.5)
EGFR: 79 ML/MIN/1.73M2 — SIGNIFICANT CHANGE UP
GLUCOSE SERPL-MCNC: 115 MG/DL — HIGH (ref 70–99)
HCT VFR BLD CALC: 43.4 % — SIGNIFICANT CHANGE UP (ref 42–52)
HGB BLD-MCNC: 13.1 G/DL — LOW (ref 14–18)
MAGNESIUM SERPL-MCNC: 1.9 MG/DL — SIGNIFICANT CHANGE UP (ref 1.8–2.4)
MCHC RBC-ENTMCNC: 23 PG — LOW (ref 27–31)
MCHC RBC-ENTMCNC: 30.2 G/DL — LOW (ref 32–37)
MCV RBC AUTO: 76.3 FL — LOW (ref 80–94)
NRBC # BLD: 0 /100 WBCS — SIGNIFICANT CHANGE UP (ref 0–0)
PLATELET # BLD AUTO: 196 K/UL — SIGNIFICANT CHANGE UP (ref 130–400)
POTASSIUM SERPL-MCNC: 4.6 MMOL/L — SIGNIFICANT CHANGE UP (ref 3.5–5)
POTASSIUM SERPL-SCNC: 4.6 MMOL/L — SIGNIFICANT CHANGE UP (ref 3.5–5)
PROT SERPL-MCNC: 6 G/DL — SIGNIFICANT CHANGE UP (ref 6–8)
RBC # BLD: 5.69 M/UL — SIGNIFICANT CHANGE UP (ref 4.7–6.1)
RBC # FLD: 19 % — HIGH (ref 11.5–14.5)
SODIUM SERPL-SCNC: 140 MMOL/L — SIGNIFICANT CHANGE UP (ref 135–146)
WBC # BLD: 8.27 K/UL — SIGNIFICANT CHANGE UP (ref 4.8–10.8)
WBC # FLD AUTO: 8.27 K/UL — SIGNIFICANT CHANGE UP (ref 4.8–10.8)

## 2022-06-02 PROCEDURE — 99238 HOSP IP/OBS DSCHRG MGMT 30/<: CPT

## 2022-06-02 RX ORDER — DILTIAZEM HCL 120 MG
1 CAPSULE, EXT RELEASE 24 HR ORAL
Qty: 120 | Refills: 0
Start: 2022-06-02 | End: 2022-07-01

## 2022-06-02 RX ORDER — AMIODARONE HYDROCHLORIDE 400 MG/1
2 TABLET ORAL
Qty: 120 | Refills: 0
Start: 2022-06-02 | End: 2022-07-01

## 2022-06-02 RX ADMIN — PANTOPRAZOLE SODIUM 40 MILLIGRAM(S): 20 TABLET, DELAYED RELEASE ORAL at 06:20

## 2022-06-02 RX ADMIN — AMIODARONE HYDROCHLORIDE 400 MILLIGRAM(S): 400 TABLET ORAL at 06:19

## 2022-06-02 RX ADMIN — Medication 90 MILLIGRAM(S): at 06:19

## 2022-06-02 RX ADMIN — APIXABAN 5 MILLIGRAM(S): 2.5 TABLET, FILM COATED ORAL at 06:19

## 2022-06-02 RX ADMIN — LOSARTAN POTASSIUM 100 MILLIGRAM(S): 100 TABLET, FILM COATED ORAL at 06:20

## 2022-06-02 RX ADMIN — Medication 90 MILLIGRAM(S): at 00:40

## 2022-06-02 RX ADMIN — Medication 975 MILLIGRAM(S): at 07:49

## 2022-06-02 NOTE — DISCHARGE NOTE PROVIDER - HOSPITAL COURSE
HPI  75 yo M with pmhx of afibb on eliquis, HTN, HLD presenting for evaluation of shortness of breath since last night. Patient states he ate last night after 9pm and started to feel indigestion around 11pm and then had shortness of breath throughout the whole night. Patient reports taking dulcolax last night and had multiple BMs today. Patient states he was felling dizzy and lightheaded this morning and then went to [facility]. Patient states that he was treated for pneumonia in April and has been having a lingering cough throughout May and is scheduled to see a pulmonologist. He was prescribed Steroids, Albuterol inhaler, Symbicort and Duonebs by a visiting PA fe weeks ago. No cp, fever, chills, abdominal pain, nausea, vomiting, diarrhea, back pain, urinary symptoms, headache, paresthesias, or weakness.  In ED: Temp = 98.3; HR = 88; BP = 118/50; RR = 20; SaO2 = 98% on room air. Work up noted for WBC 14.2, hgb 15.2, Cr. 1.2, Mg 1.6, Trop 0.01, Pro-BNP 1962.  Patient received IV Cardizem 10 mg x 2, IV MgSO4 2 gm.    Hospital Course   Patient was admitted for Atrial Fibrillation w/ RVR. He is s/p NADINE and DCCV and  converted to sinus rhythm with synchronized  __200_ J of direct current cardioversion.  Post cardioversion pt had transient short run of parox AF but subsequently stayed in NSR. Patient remained in NSR overnight and will be discharged  on amiodarone 400mg bid x 1 week, followed by 200mg once daily.  He mis medically stable and cleared for discharge

## 2022-06-02 NOTE — DISCHARGE NOTE PROVIDER - NSDCCPCAREPLAN_GEN_ALL_CORE_FT
PRINCIPAL DISCHARGE DIAGNOSIS  Diagnosis: Atrial fibrillation  Assessment and Plan of Treatment: Atrial fibrillation is a type of irregular heartbeat (arrhythmia) where the heart quivers continuously in a chaotic pattern that makes the heart unable to pump blood normally. This can increase the risk for stroke, heart failure, and other heart-related conditions. Atrial fibrillation can be caused by a variety of conditions and may be temporary, intermittent, or permanent. Symptoms include feeling that your heart is beating rapidly or irregularly, chest discomfort, shortness of breath, or dizziness/lightheadedness that may be worse with exertion.   You had an  electrical shock (cardioversion) and are back in sinus rhtym. Please follow up with your cardiologist   SEEK IMMEDIATE MEDICAL CARE IF YOU HAVE ANY OF THE FOLLOWING SYMPTOMS: chest pain, shortness of breath, abdominal pain, sweating, vomiting, blood in vomit/bowel movements/urine, dizziness/lightheadedness, weakness or numbness to face/arm/leg, trouble speaking or understanding, facial droop.

## 2022-06-02 NOTE — DISCHARGE NOTE PROVIDER - NSDCMRMEDTOKEN_GEN_ALL_CORE_FT
amLODIPine 5 mg oral tablet: 1 tab(s) orally once a day  Depakote 250 mg oral delayed release tablet: 1 tab(s) orally once a day  Eliquis 5 mg oral tablet: 1 tab(s) orally 2 times a day  escitalopram 10 mg oral tablet: 1 tab(s) orally once a day  losartan 100 mg oral tablet: 1 tab(s) orally once a day  pantoprazole 40 mg oral delayed release tablet: 1 tab(s) orally once a day  rosuvastatin 10 mg oral tablet: 1 tab(s) orally once a day  sotalol 80 mg oral tablet: 1 tab(s) orally 2 times a day

## 2022-06-02 NOTE — DISCHARGE NOTE NURSING/CASE MANAGEMENT/SOCIAL WORK - PATIENT PORTAL LINK FT
You can access the FollowMyHealth Patient Portal offered by [facility] by registering at the following website: [email or web address]. By joining [employer]’s FollowMyHealth portal, you will also be able to view your health information using other applications (apps) compatible with our system.

## 2022-06-02 NOTE — CHART NOTE - NSCHARTNOTEFT_GEN_A_CORE
Pt seen and examined at bedside.  HR controlled. Medications discussed with pt. Medically stable for dc home.    PHYSICAL EXAM:  GENERAL: NAD, speaks in full sentences, no signs of respiratory distress  HEAD:  Atraumatic, Normocephalic  EYES: Anicteric  NECK: Supple, No JVD  CHEST/LUNG: Clear to auscultation bilaterally; No wheeze; No crackles; No accessory muscles used  HEART: Regular rate and rhythm; No murmurs;   ABDOMEN: Soft, Nontender, Nondistended; Bowel sounds present; No guarding  EXTREMITIES:  2+ Peripheral Pulses, No cyanosis or edema  PSYCH: AAOx3  NEUROLOGY: non-focal  SKIN: No rashes or lesions

## 2022-06-02 NOTE — DISCHARGE NOTE PROVIDER - CARE PROVIDER_API CALL
Amaya Issa)  Cardiology; Interventional Cardiology  [address] [place] [address]  Phone: [phone]  Fax: [phone]  Follow Up Time: 2 weeks

## 2022-06-02 NOTE — DISCHARGE NOTE NURSING/CASE MANAGEMENT/SOCIAL WORK - NSDCPEFALRISK_GEN_ALL_CORE
For information on Fall & Injury Prevention, visit: [email or web address] OR  [email or web address] OR  https://www.cdc.gov/steadi/patient.html

## 2022-06-02 NOTE — DISCHARGE NOTE PROVIDER - NSDCFUSCHEDAPPT_GEN_ALL_CORE_FT
Tripp Finley  [facility] Physician [facility]  NEUROLOGY [address] [facility]  Scheduled Appointment: 08/04/2022

## 2022-06-13 DIAGNOSIS — Z79.01 LONG TERM (CURRENT) USE OF ANTICOAGULANTS: ICD-10-CM

## 2022-06-13 DIAGNOSIS — E83.42 HYPOMAGNESEMIA: ICD-10-CM

## 2022-06-13 DIAGNOSIS — I70.0 ATHEROSCLEROSIS OF AORTA: ICD-10-CM

## 2022-06-13 DIAGNOSIS — E78.5 HYPERLIPIDEMIA, UNSPECIFIED: ICD-10-CM

## 2022-06-13 DIAGNOSIS — D72.829 ELEVATED WHITE BLOOD CELL COUNT, UNSPECIFIED: ICD-10-CM

## 2022-06-13 DIAGNOSIS — I48.0 PAROXYSMAL ATRIAL FIBRILLATION: ICD-10-CM

## 2022-06-13 DIAGNOSIS — T38.0X5A ADVERSE EFFECT OF GLUCOCORTICOIDS AND SYNTHETIC ANALOGUES, INITIAL ENCOUNTER: ICD-10-CM

## 2022-06-13 DIAGNOSIS — I10 ESSENTIAL (PRIMARY) HYPERTENSION: ICD-10-CM

## 2022-06-13 DIAGNOSIS — I48.20 CHRONIC ATRIAL FIBRILLATION, UNSPECIFIED: ICD-10-CM

## 2022-07-05 RX ORDER — SIMETHICONE 250 MG/1
250 CAPSULE, GELATIN COATED ORAL 3 TIMES DAILY
Qty: 120 | Refills: 6 | Status: COMPLETED | COMMUNITY
Start: 2021-03-30 | End: 2022-07-05

## 2022-07-05 RX ORDER — VENLAFAXINE HYDROCHLORIDE 75 MG/1
75 CAPSULE, EXTENDED RELEASE ORAL
Refills: 0 | Status: COMPLETED | COMMUNITY
End: 2022-07-05

## 2022-07-05 RX ORDER — ESOMEPRAZOLE MAGNESIUM 40 MG/1
40 CAPSULE, DELAYED RELEASE ORAL
Refills: 0 | Status: COMPLETED | COMMUNITY
End: 2022-07-05

## 2022-07-05 RX ORDER — APIXABAN 5 MG/1
5 TABLET, FILM COATED ORAL
Refills: 0 | Status: COMPLETED | COMMUNITY
End: 2022-07-05

## 2022-07-05 RX ORDER — SOTALOL HYDROCHLORIDE 80 MG/1
80 TABLET ORAL
Refills: 0 | Status: COMPLETED | COMMUNITY
End: 2022-07-05

## 2022-07-05 RX ORDER — LOSARTAN POTASSIUM 100 MG/1
TABLET, FILM COATED ORAL
Refills: 0 | Status: COMPLETED | COMMUNITY
End: 2022-07-05

## 2022-07-05 RX ORDER — ROSUVASTATIN CALCIUM 20 MG/1
20 TABLET, FILM COATED ORAL
Refills: 0 | Status: COMPLETED | COMMUNITY
End: 2022-07-05

## 2022-07-08 ENCOUNTER — APPOINTMENT (OUTPATIENT)
Dept: CARDIOLOGY | Facility: CLINIC | Age: 77
End: 2022-07-08

## 2022-07-08 DIAGNOSIS — I10 ESSENTIAL (PRIMARY) HYPERTENSION: ICD-10-CM

## 2022-07-08 DIAGNOSIS — Z86.79 PERSONAL HISTORY OF OTHER DISEASES OF THE CIRCULATORY SYSTEM: ICD-10-CM

## 2022-07-08 PROCEDURE — 93000 ELECTROCARDIOGRAM COMPLETE: CPT

## 2022-07-08 PROCEDURE — 99204 OFFICE O/P NEW MOD 45 MIN: CPT

## 2022-08-04 ENCOUNTER — APPOINTMENT (OUTPATIENT)
Dept: NEUROLOGY | Facility: CLINIC | Age: 77
End: 2022-08-04

## 2022-08-04 VITALS
HEART RATE: 88 BPM | DIASTOLIC BLOOD PRESSURE: 80 MMHG | WEIGHT: 220 LBS | SYSTOLIC BLOOD PRESSURE: 129 MMHG | TEMPERATURE: 97.9 F | HEIGHT: 72 IN | BODY MASS INDEX: 29.8 KG/M2 | OXYGEN SATURATION: 99 %

## 2022-08-04 DIAGNOSIS — R51.9 HEADACHE, UNSPECIFIED: ICD-10-CM

## 2022-08-04 DIAGNOSIS — D50.9 IRON DEFICIENCY ANEMIA, UNSPECIFIED: ICD-10-CM

## 2022-08-04 PROCEDURE — 99214 OFFICE O/P EST MOD 30 MIN: CPT

## 2022-08-04 RX ORDER — ALPRAZOLAM 0.25 MG/1
0.25 TABLET ORAL
Qty: 30 | Refills: 0 | Status: DISCONTINUED | COMMUNITY
Start: 2020-06-01 | End: 2022-08-04

## 2022-08-04 NOTE — HISTORY OF PRESENT ILLNESS
[FreeTextEntry1] : pool is here for the F/U.\par He is very much at his baseline .\par However in the last few months has been having more of those episodes of feeling a buzz in the head and a rush throughout the body.\par This can happen at different length and with different intensity and frequency for a period of up to 2-3 days. He says during that he is able to interact well and make memory\par Since last visit he has also been having other issues \par In march had pneumonia and in may  A-fib that became more symptomatic\par He is scheduled to do ablation  on Sep. 1st\par Prior to that he is supposed to do sleep study.\par No significant spinal and joint pain except the right hip\par with the A-fib he was also feeling dizzy\par His sleep is not that great. After few hours wakes up and goes down and watches TV. \par Occasionally has  HA and fioricet is helpful\par He also believes for the episodes as described above Valume is more helpful than the Xanax\par \par

## 2022-08-04 NOTE — ASSESSMENT
[FreeTextEntry1] : 1-mood disorder\par 2- PTSD\par 3- HA\par 4- A-fib\par 5- DLD/ HTN\par \par \par plan \par PRN Fioricet\par switch PRN xanax to diazepam 2 mg

## 2022-08-04 NOTE — PHYSICAL EXAM
[FreeTextEntry1] : A/A/Ox3\par follows 4 step commands\par Good mood\par good attention\par normal language\par god memory of the current events\par no dysmetria\par no drift\par slight limp on the right with stable gait\par able to stand up without using the hand

## 2022-08-10 ENCOUNTER — APPOINTMENT (OUTPATIENT)
Dept: PULMONOLOGY | Facility: CLINIC | Age: 77
End: 2022-08-10

## 2022-08-10 VITALS
WEIGHT: 215 LBS | HEART RATE: 69 BPM | SYSTOLIC BLOOD PRESSURE: 140 MMHG | BODY MASS INDEX: 29.12 KG/M2 | DIASTOLIC BLOOD PRESSURE: 80 MMHG | HEIGHT: 72 IN | OXYGEN SATURATION: 95 %

## 2022-08-10 DIAGNOSIS — Z87.891 PERSONAL HISTORY OF NICOTINE DEPENDENCE: ICD-10-CM

## 2022-08-10 PROCEDURE — 99203 OFFICE O/P NEW LOW 30 MIN: CPT

## 2022-08-10 NOTE — HISTORY OF PRESENT ILLNESS
[TextBox_4] : 77-year-old man who is a former smoker of about 10 to 20 years and quit 40 years ago, had pneumonia and March for which she was placed on inhalers (Breo), then developed atrial fibrillation in May for which he was placed on Eliquis and amiodarone.  He underwent NADINE cardioversion in June and is scheduled with EP for ablation.  He is presenting to the office for sleep apnea evaluation.  From a respiratory standpoint his cough and shortness of breath have nearly resolved and he is scheduled to get PFTs.  He is known to snore at night, has daytime sleepiness, carries a diagnosis of hypertension, has multiple nocturnal awakenings and nocturia, as well as daytime fatigue and headaches.  His STOP-BANG score is high for CAREN.  He will need a lab sleep apnea evaluation.

## 2022-08-10 NOTE — REVIEW OF SYSTEMS
[Headache] : headache [Negative] : Endocrine [TextBox_30] : Cough was present before and has resolved

## 2022-08-18 ENCOUNTER — OUTPATIENT (OUTPATIENT)
Dept: OUTPATIENT SERVICES | Facility: HOSPITAL | Age: 77
LOS: 1 days | Discharge: HOME | End: 2022-08-18

## 2022-08-18 ENCOUNTER — RESULT REVIEW (OUTPATIENT)
Age: 77
End: 2022-08-18

## 2022-08-18 VITALS
SYSTOLIC BLOOD PRESSURE: 143 MMHG | TEMPERATURE: 96 F | DIASTOLIC BLOOD PRESSURE: 77 MMHG | OXYGEN SATURATION: 96 % | HEIGHT: 72 IN | HEART RATE: 75 BPM | WEIGHT: 220.02 LBS | RESPIRATION RATE: 16 BRPM

## 2022-08-18 DIAGNOSIS — Z01.818 ENCOUNTER FOR OTHER PREPROCEDURAL EXAMINATION: ICD-10-CM

## 2022-08-18 DIAGNOSIS — Z85.89 PERSONAL HISTORY OF MALIGNANT NEOPLASM OF OTHER ORGANS AND SYSTEMS: Chronic | ICD-10-CM

## 2022-08-18 DIAGNOSIS — I48.0 PAROXYSMAL ATRIAL FIBRILLATION: ICD-10-CM

## 2022-08-18 DIAGNOSIS — K40.20 BILATERAL INGUINAL HERNIA, WITHOUT OBSTRUCTION OR GANGRENE, NOT SPECIFIED AS RECURRENT: Chronic | ICD-10-CM

## 2022-08-18 LAB
ALBUMIN SERPL ELPH-MCNC: 4.6 G/DL — SIGNIFICANT CHANGE UP (ref 3.5–5.2)
ALP SERPL-CCNC: 83 U/L — SIGNIFICANT CHANGE UP (ref 30–115)
ALT FLD-CCNC: 14 U/L — SIGNIFICANT CHANGE UP (ref 0–41)
ANION GAP SERPL CALC-SCNC: 15 MMOL/L — HIGH (ref 7–14)
APTT BLD: 34.5 SEC — SIGNIFICANT CHANGE UP (ref 27–39.2)
AST SERPL-CCNC: 21 U/L — SIGNIFICANT CHANGE UP (ref 0–41)
BASOPHILS # BLD AUTO: 0.03 K/UL — SIGNIFICANT CHANGE UP (ref 0–0.2)
BASOPHILS NFR BLD AUTO: 0.4 % — SIGNIFICANT CHANGE UP (ref 0–1)
BILIRUB SERPL-MCNC: 0.3 MG/DL — SIGNIFICANT CHANGE UP (ref 0.2–1.2)
BUN SERPL-MCNC: 19 MG/DL — SIGNIFICANT CHANGE UP (ref 10–20)
CALCIUM SERPL-MCNC: 9.7 MG/DL — SIGNIFICANT CHANGE UP (ref 8.5–10.1)
CHLORIDE SERPL-SCNC: 100 MMOL/L — SIGNIFICANT CHANGE UP (ref 98–110)
CO2 SERPL-SCNC: 24 MMOL/L — SIGNIFICANT CHANGE UP (ref 17–32)
CREAT SERPL-MCNC: 1 MG/DL — SIGNIFICANT CHANGE UP (ref 0.7–1.5)
EGFR: 79 ML/MIN/1.73M2 — SIGNIFICANT CHANGE UP
EOSINOPHIL # BLD AUTO: 0.04 K/UL — SIGNIFICANT CHANGE UP (ref 0–0.7)
EOSINOPHIL NFR BLD AUTO: 0.5 % — SIGNIFICANT CHANGE UP (ref 0–8)
GLUCOSE SERPL-MCNC: 105 MG/DL — HIGH (ref 70–99)
HCT VFR BLD CALC: 46.3 % — SIGNIFICANT CHANGE UP (ref 42–52)
HGB BLD-MCNC: 14 G/DL — SIGNIFICANT CHANGE UP (ref 14–18)
IMM GRANULOCYTES NFR BLD AUTO: 0.3 % — SIGNIFICANT CHANGE UP (ref 0.1–0.3)
INR BLD: 1.25 RATIO — SIGNIFICANT CHANGE UP (ref 0.65–1.3)
LYMPHOCYTES # BLD AUTO: 1 K/UL — LOW (ref 1.2–3.4)
LYMPHOCYTES # BLD AUTO: 12.6 % — LOW (ref 20.5–51.1)
MCHC RBC-ENTMCNC: 23.4 PG — LOW (ref 27–31)
MCHC RBC-ENTMCNC: 30.2 G/DL — LOW (ref 32–37)
MCV RBC AUTO: 77.4 FL — LOW (ref 80–94)
MONOCYTES # BLD AUTO: 0.75 K/UL — HIGH (ref 0.1–0.6)
MONOCYTES NFR BLD AUTO: 9.5 % — HIGH (ref 1.7–9.3)
NEUTROPHILS # BLD AUTO: 6.07 K/UL — SIGNIFICANT CHANGE UP (ref 1.4–6.5)
NEUTROPHILS NFR BLD AUTO: 76.7 % — HIGH (ref 42.2–75.2)
NRBC # BLD: 0 /100 WBCS — SIGNIFICANT CHANGE UP (ref 0–0)
PLATELET # BLD AUTO: 236 K/UL — SIGNIFICANT CHANGE UP (ref 130–400)
POTASSIUM SERPL-MCNC: 4.4 MMOL/L — SIGNIFICANT CHANGE UP (ref 3.5–5)
POTASSIUM SERPL-SCNC: 4.4 MMOL/L — SIGNIFICANT CHANGE UP (ref 3.5–5)
PROT SERPL-MCNC: 7.5 G/DL — SIGNIFICANT CHANGE UP (ref 6–8)
PROTHROM AB SERPL-ACNC: 14.4 SEC — HIGH (ref 9.95–12.87)
RBC # BLD: 5.98 M/UL — SIGNIFICANT CHANGE UP (ref 4.7–6.1)
RBC # FLD: 19.6 % — HIGH (ref 11.5–14.5)
SODIUM SERPL-SCNC: 139 MMOL/L — SIGNIFICANT CHANGE UP (ref 135–146)
WBC # BLD: 7.91 K/UL — SIGNIFICANT CHANGE UP (ref 4.8–10.8)
WBC # FLD AUTO: 7.91 K/UL — SIGNIFICANT CHANGE UP (ref 4.8–10.8)

## 2022-08-18 PROCEDURE — 71046 X-RAY EXAM CHEST 2 VIEWS: CPT | Mod: 26

## 2022-08-18 PROCEDURE — 93010 ELECTROCARDIOGRAM REPORT: CPT

## 2022-08-18 RX ORDER — ESCITALOPRAM OXALATE 10 MG/1
1 TABLET, FILM COATED ORAL
Qty: 0 | Refills: 0 | DISCHARGE

## 2022-08-18 NOTE — H&P PST ADULT - REASON FOR ADMISSION
Case Type: OP Block TimeSuite: EP LabProceduralist: Ian Tafoya  Confirmed Surgery DateTime: 09- - 11:00PAST DateTime: 08- - 10:30Procedure: AF ABLATION/ RF/ NADINE  Laterality: N/ALength of Procedure: 180 Minutes  Anesthesia Type: General

## 2022-08-18 NOTE — H&P PST ADULT - HISTORY OF PRESENT ILLNESS
75 yo male presents for PAST in preparation for Afib   Pt complains of a long history of A fib, Per pt in March he had Bronchitis and was prescribed nebulizer treatment that he misused ( every 30 minutes) that triggered palpitations from overdose of steroids. Also pt states that he had ablation done in the past. Denies any chest pain, difficulty breathing, SOB, palpitations, dysuria, URI, or any other infections in the last 2 weeks/1 month. Denies any recent travel, contact, or exposure to any persons with known or suspected COVID-19. Pt also denies COVID testing within the last 2 weeks. Pt advised to self quarantine until day of procedure. Exercise tolerance of Treadmill 20-30 minutes per day. without dyspnea. CAREN reviewed with patient.  Anesthesia Alert  NO--Difficult Airway  NO--History of neck surgery or radiation  NO--Limited ROM of neck  NO--History of Malignant hyperthermia  NO--Personal or family history of Pseudocholinesterase deficiency.  NO--Prior Anesthesia Complication  NO--Latex Allergy  NO--Loose teeth  NO--History of Rheumatoid Arthritis  NO--CAREN  NO--Bleeding risk- Eliquis   NO--Other_____   written and verbal instructions with teach back on chlorhexidine shampoo provided,  pt verbalized understanding with returned demonstration  Patient verbalized understanding of instructions and was given the opportunity to ask questions and have them answered.

## 2022-08-18 NOTE — H&P PST ADULT - PATIENT ON (OXYGEN DELIVERY METHOD)
Error. Addressed in other triage encounter    Stormy Jimenez RN [facility] Triage     
room air

## 2022-08-29 ENCOUNTER — LABORATORY RESULT (OUTPATIENT)
Age: 77
End: 2022-08-29

## 2022-09-01 ENCOUNTER — INPATIENT (INPATIENT)
Facility: HOSPITAL | Age: 77
LOS: 0 days | Discharge: HOME | End: 2022-09-02
Attending: STUDENT IN AN ORGANIZED HEALTH CARE EDUCATION/TRAINING PROGRAM | Admitting: STUDENT IN AN ORGANIZED HEALTH CARE EDUCATION/TRAINING PROGRAM

## 2022-09-01 ENCOUNTER — TRANSCRIPTION ENCOUNTER (OUTPATIENT)
Age: 77
End: 2022-09-01

## 2022-09-01 VITALS — HEIGHT: 71.65 IN | WEIGHT: 218.26 LBS

## 2022-09-01 DIAGNOSIS — K40.20 BILATERAL INGUINAL HERNIA, WITHOUT OBSTRUCTION OR GANGRENE, NOT SPECIFIED AS RECURRENT: Chronic | ICD-10-CM

## 2022-09-01 DIAGNOSIS — Z85.89 PERSONAL HISTORY OF MALIGNANT NEOPLASM OF OTHER ORGANS AND SYSTEMS: Chronic | ICD-10-CM

## 2022-09-01 DIAGNOSIS — I48.0 PAROXYSMAL ATRIAL FIBRILLATION: ICD-10-CM

## 2022-09-01 PROCEDURE — 93656 COMPRE EP EVAL ABLTJ ATR FIB: CPT

## 2022-09-01 PROCEDURE — 93312 ECHO TRANSESOPHAGEAL: CPT | Mod: 26

## 2022-09-01 PROCEDURE — 93657 TX L/R ATRIAL FIB ADDL: CPT

## 2022-09-01 PROCEDURE — 93655 ICAR CATH ABLTJ DSCRT ARRHYT: CPT

## 2022-09-01 PROCEDURE — 93325 DOPPLER ECHO COLOR FLOW MAPG: CPT | Mod: 26

## 2022-09-01 PROCEDURE — 93613 INTRACARDIAC EPHYS 3D MAPG: CPT

## 2022-09-01 PROCEDURE — 93320 DOPPLER ECHO COMPLETE: CPT | Mod: 26

## 2022-09-01 PROCEDURE — 93623 PRGRMD STIMJ&PACG IV RX NFS: CPT | Mod: 26

## 2022-09-01 PROCEDURE — 93662 INTRACARDIAC ECG (ICE): CPT | Mod: 26

## 2022-09-01 RX ORDER — ESCITALOPRAM OXALATE 10 MG/1
5 TABLET, FILM COATED ORAL DAILY
Refills: 0 | Status: DISCONTINUED | OUTPATIENT
Start: 2022-09-01 | End: 2022-09-02

## 2022-09-01 RX ORDER — METOPROLOL TARTRATE 50 MG
25 TABLET ORAL DAILY
Refills: 0 | Status: DISCONTINUED | OUTPATIENT
Start: 2022-09-01 | End: 2022-09-02

## 2022-09-01 RX ORDER — ATORVASTATIN CALCIUM 80 MG/1
40 TABLET, FILM COATED ORAL AT BEDTIME
Refills: 0 | Status: DISCONTINUED | OUTPATIENT
Start: 2022-09-01 | End: 2022-09-02

## 2022-09-01 RX ORDER — AMIODARONE HYDROCHLORIDE 400 MG/1
200 TABLET ORAL DAILY
Refills: 0 | Status: DISCONTINUED | OUTPATIENT
Start: 2022-09-01 | End: 2022-09-02

## 2022-09-01 RX ORDER — FAMOTIDINE 10 MG/ML
20 INJECTION INTRAVENOUS
Refills: 0 | Status: DISCONTINUED | OUTPATIENT
Start: 2022-09-01 | End: 2022-09-02

## 2022-09-01 RX ORDER — DIVALPROEX SODIUM 500 MG/1
1 TABLET, DELAYED RELEASE ORAL
Qty: 0 | Refills: 0 | DISCHARGE

## 2022-09-01 RX ORDER — DIVALPROEX SODIUM 500 MG/1
250 TABLET, DELAYED RELEASE ORAL DAILY
Refills: 0 | Status: DISCONTINUED | OUTPATIENT
Start: 2022-09-01 | End: 2022-09-02

## 2022-09-01 RX ORDER — APIXABAN 2.5 MG/1
5 TABLET, FILM COATED ORAL EVERY 12 HOURS
Refills: 0 | Status: DISCONTINUED | OUTPATIENT
Start: 2022-09-01 | End: 2022-09-01

## 2022-09-01 RX ORDER — AMLODIPINE BESYLATE 2.5 MG/1
5 TABLET ORAL AT BEDTIME
Refills: 0 | Status: DISCONTINUED | OUTPATIENT
Start: 2022-09-01 | End: 2022-09-02

## 2022-09-01 RX ORDER — APIXABAN 2.5 MG/1
5 TABLET, FILM COATED ORAL EVERY 12 HOURS
Refills: 0 | Status: DISCONTINUED | OUTPATIENT
Start: 2022-09-01 | End: 2022-09-02

## 2022-09-01 RX ORDER — ROSUVASTATIN CALCIUM 5 MG/1
1 TABLET ORAL
Qty: 0 | Refills: 0 | DISCHARGE

## 2022-09-01 RX ORDER — ESCITALOPRAM OXALATE 10 MG/1
0.5 TABLET, FILM COATED ORAL
Qty: 0 | Refills: 0 | DISCHARGE

## 2022-09-01 RX ORDER — FAMOTIDINE 10 MG/ML
1 INJECTION INTRAVENOUS
Qty: 60 | Refills: 0
Start: 2022-09-01 | End: 2022-09-30

## 2022-09-01 RX ORDER — INFLUENZA VIRUS VACCINE 15; 15; 15; 15 UG/.5ML; UG/.5ML; UG/.5ML; UG/.5ML
0.7 SUSPENSION INTRAMUSCULAR ONCE
Refills: 0 | Status: DISCONTINUED | OUTPATIENT
Start: 2022-09-01 | End: 2022-09-02

## 2022-09-01 RX ORDER — APIXABAN 2.5 MG/1
1 TABLET, FILM COATED ORAL
Qty: 0 | Refills: 0 | DISCHARGE

## 2022-09-01 RX ORDER — LOSARTAN POTASSIUM 100 MG/1
100 TABLET, FILM COATED ORAL DAILY
Refills: 0 | Status: DISCONTINUED | OUTPATIENT
Start: 2022-09-01 | End: 2022-09-02

## 2022-09-01 RX ORDER — AMLODIPINE BESYLATE 2.5 MG/1
1 TABLET ORAL
Qty: 0 | Refills: 0 | DISCHARGE
Start: 2022-09-01

## 2022-09-01 RX ADMIN — AMLODIPINE BESYLATE 5 MILLIGRAM(S): 2.5 TABLET ORAL at 22:25

## 2022-09-01 RX ADMIN — Medication 1 CAPSULE(S): at 23:38

## 2022-09-01 RX ADMIN — ATORVASTATIN CALCIUM 40 MILLIGRAM(S): 80 TABLET, FILM COATED ORAL at 22:25

## 2022-09-01 RX ADMIN — APIXABAN 5 MILLIGRAM(S): 2.5 TABLET, FILM COATED ORAL at 17:20

## 2022-09-01 NOTE — DISCHARGE NOTE PROVIDER - NSDCFUSCHEDAPPT_GEN_ALL_CORE_FT
Collette Peraza  [facility] Physician [facility]  PULMMED [address]  Scheduled Appointment: 09/26/2022    Norma Lancaster  [facility] Physician [facility]  CARDIOLOGY [address]  Scheduled Appointment: 10/11/2022

## 2022-09-01 NOTE — PATIENT PROFILE ADULT - FALL HARM RISK - HARM RISK INTERVENTIONS

## 2022-09-01 NOTE — PATIENT PROFILE ADULT - NSPROEXTENSIONSOFSELF_GEN_A_NUR
Received request via: Pharmacy    Was the patient seen in the last year in this department? Yes    Does the patient have an active prescription (recently filled or refills available) for medication(s) requested? No   none

## 2022-09-01 NOTE — DISCHARGE NOTE PROVIDER - NSDCCPTREATMENT_GEN_ALL_CORE_FT
PRINCIPAL PROCEDURE  Procedure: Intracardiac catheter ablation for atrial fibrillation  Findings and Treatment:   - Continue Eliquis   - Do NOT stop blood thinners unless discussed with doctor  - Cont Protonix 40 mg daily x 30 days  - No heavy lifting > 10 lbs, squatting, or exertional activities 1-2 weeks  - Can take a shower starting today  - No submerging in water for 1 week  - No driving for 3 days  - FU in office on **         PRINCIPAL PROCEDURE  Procedure: Intracardiac catheter ablation for atrial fibrillation  Findings and Treatment:   - Continue Eliquis   - Do NOT stop blood thinners unless discussed with doctor  - Cont Protonix 40 mg daily x 30 days  - No heavy lifting > 10 lbs, squatting, or exertional activities 1-2 weeks  - Can take a shower starting today  - No submerging in water for 1 week  - No driving for 3 days

## 2022-09-01 NOTE — DISCHARGE NOTE PROVIDER - NSDCMRMEDTOKEN_GEN_ALL_CORE_FT
amiodarone 200 mg oral tablet: 2 tab(s) orally 2 times a day for four days and then 1 tab once a day thereafter   amLODIPine 5 mg oral tablet: 1 tab(s) orally once a day (at bedtime)  Depakote 250 mg oral delayed release tablet: 1 tab(s) orally once a day  Eliquis 5 mg oral tablet: 1 tab(s) orally 2 times a day  escitalopram 10 mg oral tablet: 0.5 tab(s) orally once a day  losartan 100 mg oral tablet: 1 tab(s) orally once a day  metoprolol succinate 25 mg oral tablet, extended release: 1 tab(s) orally once a day  pantoprazole 40 mg oral delayed release tablet: 1 tab(s) orally once a day  rosuvastatin 10 mg oral tablet: 1 tab(s) orally once a day   amiodarone 200 mg oral tablet: 2 tab(s) orally 2 times a day for four days and then 1 tab once a day thereafter   losartan 100 mg oral tablet: 1 tab(s) orally once a day  metoprolol succinate 25 mg oral tablet, extended release: 1 tab(s) orally once a day  pantoprazole 40 mg oral delayed release tablet: 1 tab(s) orally once a day   amiodarone 100 mg oral tablet: 1 tab(s) orally once a day   apixaban 5 mg oral tablet: 1 tab(s) orally every 12 hours  divalproex sodium 250 mg oral delayed release tablet: 1 tab(s) orally once a day  escitalopram 5 mg oral tablet: 1 tab(s) orally once a day  losartan 100 mg oral tablet: 1 tab(s) orally once a day  metoprolol succinate 25 mg oral tablet, extended release: 1 tab(s) orally once a day  pantoprazole 40 mg oral delayed release tablet: 1 tab(s) orally once a day   amiodarone 100 mg oral tablet: 1 tab(s) orally once a day   amLODIPine 5 mg oral tablet: 1 tab(s) orally once a day  apixaban 5 mg oral tablet: 1 tab(s) orally every 12 hours  divalproex sodium 250 mg oral delayed release tablet: 1 tab(s) orally once a day  escitalopram 5 mg oral tablet: 1 tab(s) orally once a day  icosapent 1 g oral capsule: 1 cap(s) orally 2 times a day  losartan 100 mg oral tablet: 1 tab(s) orally once a day  metoprolol succinate 25 mg oral tablet, extended release: 1 tab(s) orally once a day  pantoprazole 40 mg oral delayed release tablet: 1 tab(s) orally once a day  rosuvastatin 10 mg oral tablet: 1 tab(s) orally once a day

## 2022-09-01 NOTE — PACU DISCHARGE NOTE - COMMENTS
77 y/o M s/p EP study and ablation under GETA. No anesthesia related complications.     HR: 70  BP: 109/60  SpO2: 97%   RR: 17   T: 97.5

## 2022-09-01 NOTE — DISCHARGE NOTE PROVIDER - CARE PROVIDER_API CALL
Norma Lancaster)  Cardiovascular Disease; Internal Medicine  [address] [place] [address], [address]  [place], [place] [id number]  Phone: [phone]  Fax: [phone]  Scheduled Appointment: 10/11/2022 01:00 PM

## 2022-09-01 NOTE — CHART NOTE - NSCHARTNOTEFT_GEN_A_CORE
Electrophysiology Brief Post-Op Note      I have personally seen and examined the patient.  I agree with the history and physical which I have reviewed and noted any changes below.      PRE-OP DIAGNOSIS:  -Persistent Atrial Fibrillation    POST-OP DIAGNOSIS:  -Persistent Atrial Fibrillation      PROCEDURE:  -RF ablation of Atrial Fibrillation   -3D mapping   -Infusion of Medicine  -Transeptal puncture  -Use of intracardiac Echo  -NADINE      Vascular Access used (using Ultrasound Guidance)  -Right Femoral Vein: 8F    -Left Femoral Vein: 11F 8F  -Right Femoral Artery: none    All sheaths and wires removed (Perclose x1 in 8Fr and 2 in 11F; figure 8 left groin), and manual pressure applied.    Physician: Lottie  Assistant: None    ANESTHESIA TYPE:  [X]General Anesthesia  [  ] Sedation  [X] Local/Regional      CONDITION  [  ] Critical  [  ] Serious  [  ]Fair  [X]Good      SPECIMENS REMOVED (IF APPLICABLE): NONE      IMPLANTS (IF APPLICABLE): NONE      FINDINGS (see below)  -Successful Pulmonary Veins Isolation   -Successful Posterior Wall Isolation  -Successful CTI isthmus ablation  -No pericardial effusion on ICE  -No immediate complications  -Perclose x1 in 8Fr and 2 in 11F; figure 8 left groin  -ESTIMATED BLOOD LOSS:  15 mL  -Contrast Used: none        PLAN OF CARE  -               -	Start Eliquis 5 mg q12 tonight at 6 pm  -	Start pepcid 20 mg daily tonight  -	Bed rest for 4 hours  -	Admit to telemetry

## 2022-09-01 NOTE — DISCHARGE NOTE PROVIDER - ATTENDING DISCHARGE PHYSICAL EXAMINATION:
I saw and examined the patient at bedside with the NP the day of discharge.  He is hemodynamically stable, comfortable appearing, and without complaints.  Physical exam is grossly unremarkable. His venous access sites are clean, dry, and intact without hematoma or active bleeding.  He is stable for discharge with EP follow up.

## 2022-09-01 NOTE — DISCHARGE NOTE PROVIDER - HOSPITAL COURSE
75 y/o male with h/o persistent a-fib on Eliquis (h/o unsuccessful past AF ablations), HTN, HLD and gastritis presented for an elective a-fib ablation now s/p successful CTI isthmus ablation.  Bilateral groins C/D/I without swelling or hematoma noted.  **Sutures removed.  Distal DP pulses palpable, extremities warm.  EKG reviewed, SR maintained. ** + void post chapman removal.  He will be discharged home on Eliquis 5 mg Q 12.  Seen and examined at bedside, he is stable for discharge to home today. 77 y/o male with h/o persistent a-fib on Eliquis (h/o unsuccessful past AF ablations), HTN, HLD and gastritis presented for an elective a-fib ablation now s/p successful CTI isthmus ablation.  Bilateral groins C/D/I without swelling or hematoma noted.  Distal DP pulses palpable, extremities warm.  EKG reviewed, SR maintained. voided post chapman removal.  He will be discharged home on Eliquis 5 mg Q 12hrs and amiodarone 100mg daily.  Seen and examined at bedside, he is stable for discharge to home today.

## 2022-09-02 ENCOUNTER — TRANSCRIPTION ENCOUNTER (OUTPATIENT)
Age: 77
End: 2022-09-02

## 2022-09-02 VITALS — WEIGHT: 215.39 LBS

## 2022-09-02 LAB
ANION GAP SERPL CALC-SCNC: 11 MMOL/L — SIGNIFICANT CHANGE UP (ref 7–14)
BUN SERPL-MCNC: 18 MG/DL — SIGNIFICANT CHANGE UP (ref 10–20)
CALCIUM SERPL-MCNC: 8.5 MG/DL — SIGNIFICANT CHANGE UP (ref 8.5–10.1)
CHLORIDE SERPL-SCNC: 104 MMOL/L — SIGNIFICANT CHANGE UP (ref 98–110)
CO2 SERPL-SCNC: 25 MMOL/L — SIGNIFICANT CHANGE UP (ref 17–32)
CREAT SERPL-MCNC: 1.1 MG/DL — SIGNIFICANT CHANGE UP (ref 0.7–1.5)
EGFR: 70 ML/MIN/1.73M2 — SIGNIFICANT CHANGE UP
GLUCOSE SERPL-MCNC: 103 MG/DL — HIGH (ref 70–99)
HCT VFR BLD CALC: 39.1 % — LOW (ref 42–52)
HGB BLD-MCNC: 12.2 G/DL — LOW (ref 14–18)
MAGNESIUM SERPL-MCNC: 2 MG/DL — SIGNIFICANT CHANGE UP (ref 1.8–2.4)
MCHC RBC-ENTMCNC: 24.2 PG — LOW (ref 27–31)
MCHC RBC-ENTMCNC: 31.2 G/DL — LOW (ref 32–37)
MCV RBC AUTO: 77.4 FL — LOW (ref 80–94)
NRBC # BLD: 0 /100 WBCS — SIGNIFICANT CHANGE UP (ref 0–0)
PLATELET # BLD AUTO: 206 K/UL — SIGNIFICANT CHANGE UP (ref 130–400)
POTASSIUM SERPL-MCNC: 4.7 MMOL/L — SIGNIFICANT CHANGE UP (ref 3.5–5)
POTASSIUM SERPL-SCNC: 4.7 MMOL/L — SIGNIFICANT CHANGE UP (ref 3.5–5)
RBC # BLD: 5.05 M/UL — SIGNIFICANT CHANGE UP (ref 4.7–6.1)
RBC # FLD: 18.6 % — HIGH (ref 11.5–14.5)
SODIUM SERPL-SCNC: 140 MMOL/L — SIGNIFICANT CHANGE UP (ref 135–146)
WBC # BLD: 11.84 K/UL — HIGH (ref 4.8–10.8)
WBC # FLD AUTO: 11.84 K/UL — HIGH (ref 4.8–10.8)

## 2022-09-02 PROCEDURE — 93010 ELECTROCARDIOGRAM REPORT: CPT

## 2022-09-02 PROCEDURE — 99238 HOSP IP/OBS DSCHRG MGMT 30/<: CPT

## 2022-09-02 PROCEDURE — 99232 SBSQ HOSP IP/OBS MODERATE 35: CPT

## 2022-09-02 RX ORDER — METOPROLOL TARTRATE 50 MG
1 TABLET ORAL
Qty: 0 | Refills: 0 | DISCHARGE

## 2022-09-02 RX ORDER — DIVALPROEX SODIUM 500 MG/1
1 TABLET, DELAYED RELEASE ORAL
Qty: 0 | Refills: 0 | DISCHARGE
Start: 2022-09-02

## 2022-09-02 RX ORDER — ROSUVASTATIN CALCIUM 5 MG/1
1 TABLET ORAL
Qty: 0 | Refills: 0 | DISCHARGE

## 2022-09-02 RX ORDER — APIXABAN 2.5 MG/1
1 TABLET, FILM COATED ORAL
Qty: 0 | Refills: 0 | DISCHARGE
Start: 2022-09-02

## 2022-09-02 RX ORDER — ICOSAPENT ETHYL 500 MG/1
1 CAPSULE, LIQUID FILLED ORAL
Qty: 0 | Refills: 0 | DISCHARGE

## 2022-09-02 RX ORDER — SIMETHICONE 80 MG/1
80 TABLET, CHEWABLE ORAL EVERY 6 HOURS
Refills: 0 | Status: DISCONTINUED | OUTPATIENT
Start: 2022-09-02 | End: 2022-09-02

## 2022-09-02 RX ORDER — ESCITALOPRAM OXALATE 10 MG/1
1 TABLET, FILM COATED ORAL
Qty: 0 | Refills: 0 | DISCHARGE
Start: 2022-09-02

## 2022-09-02 RX ORDER — AMLODIPINE BESYLATE 2.5 MG/1
1 TABLET ORAL
Qty: 0 | Refills: 0 | DISCHARGE

## 2022-09-02 RX ORDER — ACETAMINOPHEN 500 MG
650 TABLET ORAL EVERY 6 HOURS
Refills: 0 | Status: DISCONTINUED | OUTPATIENT
Start: 2022-09-02 | End: 2022-09-02

## 2022-09-02 RX ORDER — AMIODARONE HYDROCHLORIDE 400 MG/1
1 TABLET ORAL
Qty: 30 | Refills: 0
Start: 2022-09-02 | End: 2022-10-01

## 2022-09-02 RX ORDER — LOSARTAN POTASSIUM 100 MG/1
1 TABLET, FILM COATED ORAL
Qty: 0 | Refills: 0 | DISCHARGE

## 2022-09-02 RX ORDER — ICOSAPENT ETHYL 500 MG/1
2 CAPSULE, LIQUID FILLED ORAL
Qty: 0 | Refills: 0 | DISCHARGE

## 2022-09-02 RX ORDER — PANTOPRAZOLE SODIUM 20 MG/1
1 TABLET, DELAYED RELEASE ORAL
Qty: 0 | Refills: 0 | DISCHARGE

## 2022-09-02 RX ADMIN — LOSARTAN POTASSIUM 100 MILLIGRAM(S): 100 TABLET, FILM COATED ORAL at 05:31

## 2022-09-02 RX ADMIN — SIMETHICONE 80 MILLIGRAM(S): 80 TABLET, CHEWABLE ORAL at 08:44

## 2022-09-02 RX ADMIN — Medication 650 MILLIGRAM(S): at 09:19

## 2022-09-02 RX ADMIN — Medication 25 MILLIGRAM(S): at 05:31

## 2022-09-02 RX ADMIN — Medication 1 CAPSULE(S): at 00:50

## 2022-09-02 RX ADMIN — FAMOTIDINE 20 MILLIGRAM(S): 10 INJECTION INTRAVENOUS at 05:31

## 2022-09-02 RX ADMIN — AMIODARONE HYDROCHLORIDE 200 MILLIGRAM(S): 400 TABLET ORAL at 05:30

## 2022-09-02 RX ADMIN — DIVALPROEX SODIUM 250 MILLIGRAM(S): 500 TABLET, DELAYED RELEASE ORAL at 11:10

## 2022-09-02 RX ADMIN — ESCITALOPRAM OXALATE 5 MILLIGRAM(S): 10 TABLET, FILM COATED ORAL at 11:10

## 2022-09-02 RX ADMIN — Medication 650 MILLIGRAM(S): at 08:44

## 2022-09-02 RX ADMIN — APIXABAN 5 MILLIGRAM(S): 2.5 TABLET, FILM COATED ORAL at 05:31

## 2022-09-02 NOTE — PROGRESS NOTE ADULT - ASSESSMENT
Assessment: 77 y/o male with h/o persistent a-fib on Eliquis (h/o unsuccessful past AF ablations), HTN, HLD and gastritis admitted for AF ablation. Patient POD#1 and feeling well.    Impression:  AF sp Ablation  HTN  HLD    Plan:  - Cont Eliquis 5mg Q12h  - Cont Amiodarone and Toprol  - Start Pepcid 20mg daily for one month  - No heavy lifting or squatting for one week  - Follow up with Dr Tafoya in one month Assessment: 75 y/o male with h/o persistent a-fib on Eliquis (h/o unsuccessful past AF ablations), HTN, HLD and gastritis admitted for AF ablation. Patient POD#1 and feeling well.    Impression:  AF sp Ablation  HTN  HLD    Plan:  - Cont Eliquis 5mg Q12h  - Cont Toprol XL 25mg  - Decrease Amiodarone to 100mg daily  - Start Pepcid 20mg daily for one month  - No heavy lifting or squatting for one week  - Follow up with Dr Tafoya in one month

## 2022-09-02 NOTE — DISCHARGE NOTE NURSING/CASE MANAGEMENT/SOCIAL WORK - NSDCPEFALRISK_GEN_ALL_CORE
For information on Fall & Injury Prevention, visit: [email or web address] OR  [email or web address] OR  https://www.cdc.gov/steadi/patient.html

## 2022-09-02 NOTE — PROGRESS NOTE ADULT - SUBJECTIVE AND OBJECTIVE BOX
INTERVAL HPI/OVERNIGHT EVENTS: No acute events overnight    MEDICATIONS  (STANDING):  aMIOdarone    Tablet 200 milliGRAM(s) Oral daily  amLODIPine   Tablet 5 milliGRAM(s) Oral at bedtime  apixaban 5 milliGRAM(s) Oral every 12 hours  atorvastatin 40 milliGRAM(s) Oral at bedtime  diVALproex  milliGRAM(s) Oral daily  escitalopram 5 milliGRAM(s) Oral daily  famotidine    Tablet 20 milliGRAM(s) Oral two times a day  influenza  Vaccine (HIGH DOSE) 0.7 milliLiter(s) IntraMuscular once  losartan 100 milliGRAM(s) Oral daily  metoprolol succinate ER 25 milliGRAM(s) Oral daily    MEDICATIONS  (PRN):  acetaminophen     Tablet .. 650 milliGRAM(s) Oral every 6 hours PRN Mild Pain (1 - 3)  simethicone 80 milliGRAM(s) Chew every 6 hours PRN Dyspepsia      Allergies  No Known Allergies    Intolerances        REVIEW OF SYSTEMS: No CP, palpitations, dizziness or SOB    Vital Signs Last 24 Hrs  T(C): 36.7 (02 Sep 2022 05:28), Max: 36.7 (02 Sep 2022 05:28)  T(F): 98 (02 Sep 2022 05:28), Max: 98 (02 Sep 2022 05:28)  HR: 70 (02 Sep 2022 05:28) (70 - 80)  BP: 156/74 (01 Sep 2022 22:57) (129/70 - 156/74)  BP(mean): --  RR: 18 (02 Sep 2022 05:28) (15 - 27)  SpO2: 97% (02 Sep 2022 05:28) (92% - 97%)    Parameters below as of 02 Sep 2022 05:28  Patient On (Oxygen Delivery Method): room air        09-01-22 @ 07:01  -  09-02-22 @ 07:00  --------------------------------------------------------  IN: 490 mL / OUT: 675 mL / NET: -185 mL        Physical Exam  GENERAL: In no apparent distress, well nourished, and hydrated.  EYES: EOMI, PERRLA, conjunctiva and sclera clear  NECK: Supple  HEART: Regular rate and rhythm; No murmurs, rubs, or gallops.  PULMONARY: Clear to auscultation and perfusion.  No rales, wheezing, or rhonchi bilaterally.  EXTREMITIES:  2+ Peripheral Pulses, No clubbing, cyanosis, or edema  SKIN: Groins healing well, no hematoma  NEUROLOGICAL: Grossly nonfocal    LABS:                        12.2   11.84 )-----------( 206      ( 02 Sep 2022 05:35 )             39.1     09-02    140  |  104  |  18  ----------------------------<  103<H>  4.7   |  25  |  1.1    Ca    8.5      02 Sep 2022 05:35  Mg     2.0     09-02            RADIOLOGY & ADDITIONAL TESTS:   INTERVAL HPI/OVERNIGHT EVENTS: No acute events overnight    MEDICATIONS  (STANDING):  aMIOdarone    Tablet 200 milliGRAM(s) Oral daily  amLODIPine   Tablet 5 milliGRAM(s) Oral at bedtime  apixaban 5 milliGRAM(s) Oral every 12 hours  atorvastatin 40 milliGRAM(s) Oral at bedtime  diVALproex  milliGRAM(s) Oral daily  escitalopram 5 milliGRAM(s) Oral daily  famotidine    Tablet 20 milliGRAM(s) Oral two times a day  influenza  Vaccine (HIGH DOSE) 0.7 milliLiter(s) IntraMuscular once  losartan 100 milliGRAM(s) Oral daily  metoprolol succinate ER 25 milliGRAM(s) Oral daily    MEDICATIONS  (PRN):  acetaminophen     Tablet .. 650 milliGRAM(s) Oral every 6 hours PRN Mild Pain (1 - 3)  simethicone 80 milliGRAM(s) Chew every 6 hours PRN Dyspepsia      Allergies  No Known Allergies    Intolerances        REVIEW OF SYSTEMS: No CP, palpitations, dizziness or SOB    Vital Signs Last 24 Hrs  T(C): 36.7 (02 Sep 2022 05:28), Max: 36.7 (02 Sep 2022 05:28)  T(F): 98 (02 Sep 2022 05:28), Max: 98 (02 Sep 2022 05:28)  HR: 70 (02 Sep 2022 05:28) (70 - 80)  BP: 156/74 (01 Sep 2022 22:57) (129/70 - 156/74)  BP(mean): --  RR: 18 (02 Sep 2022 05:28) (15 - 27)  SpO2: 97% (02 Sep 2022 05:28) (92% - 97%)    Parameters below as of 02 Sep 2022 05:28  Patient On (Oxygen Delivery Method): room air        09-01-22 @ 07:01  -  09-02-22 @ 07:00  --------------------------------------------------------  IN: 490 mL / OUT: 675 mL / NET: -185 mL        Physical Exam  GENERAL: In no apparent distress, well nourished, and hydrated.  EYES: EOMI, PERRLA, conjunctiva and sclera clear  NECK: Supple  HEART: Regular rate and rhythm; No murmurs, rubs, or gallops.  PULMONARY: Clear to auscultation and perfusion.  No rales, wheezing, or rhonchi bilaterally.  EXTREMITIES:  2+ Peripheral Pulses, No clubbing, cyanosis, or edema  SKIN: bilateral groins healing well, no hematoma or bleeding  NEUROLOGICAL: Grossly nonfocal    LABS:                        12.2   11.84 )-----------( 206      ( 02 Sep 2022 05:35 )             39.1     09-02    140  |  104  |  18  ----------------------------<  103<H>  4.7   |  25  |  1.1    Ca    8.5      02 Sep 2022 05:35  Mg     2.0     09-02            RADIOLOGY & ADDITIONAL TESTS:

## 2022-09-02 NOTE — DISCHARGE NOTE NURSING/CASE MANAGEMENT/SOCIAL WORK - PATIENT PORTAL LINK FT
You can access the FollowMyHealth Patient Portal offered by [facility] by registering at the following website: [email or web address]. By joining [employer]’s FollowMyHealth portal, you will also be able to view your health information using other applications (apps) compatible with our system.

## 2022-09-02 NOTE — PROGRESS NOTE ADULT - NS ATTEND AMEND GEN_ALL_CORE FT
77 yo M with history of persistent AFib on Eliquis (h/o unsuccessful past AF ablations), HTN, HLD and gastritis admitted for AF ablation. Patient POD#1 and feeling well.    Impression:  AF s/p Ablation 9/1/2022  HTN    Rec  - Cont Eliquis 5mg Q12h  - Cont Toprol XL 25mg  - Decrease Amiodarone to 100mg daily  - Start Pepcid 20mg daily for one month  - No heavy lifting or squatting for one week  - Follow up with Dr Tafoya in one month

## 2022-09-07 ENCOUNTER — NON-APPOINTMENT (OUTPATIENT)
Age: 77
End: 2022-09-07

## 2022-09-11 PROBLEM — Z86.79 HISTORY OF ATRIAL FIBRILLATION: Status: RESOLVED | Noted: 2019-07-18 | Resolved: 2022-09-11

## 2022-09-11 PROBLEM — I10 ESSENTIAL HYPERTENSION: Status: ACTIVE | Noted: 2022-09-11

## 2022-09-11 NOTE — HISTORY OF PRESENT ILLNESS
[FreeTextEntry1] : hypertension, hyperlipidemia, thoracic aneurysm, persistent atrial fibrillation\par diagnosed with AF in ~2012 treated with sotalol and Eliquis\par Hospitalized in March 2022 for pneumonia and had recurrence of AF\par remained in AD with symptoms of increased fatigue and mild RODGERS\par NADINE-DCCV on 5/30/2022\par He has no chest pain, no shortness of breath, no dyspnea on exertion, no orthopnea, no PND. He denies dizziness, lightheadedness and syncope. He has no exertional symptoms. He presents for evaluation.\par \par

## 2022-09-11 NOTE — REASON FOR VISIT
[Arrhythmia/ECG Abnorrmalities] : arrhythmia/ECG abnormalities [FreeTextEntry3] : Dr. Amaya Issa, Dr. Nevaeh Stafford, Dr. Jt Ash

## 2022-09-11 NOTE — CARDIOLOGY SUMMARY
[de-identified] : (7/8/2022) sinus rhythm at 69 bpm, no significant ST/T abnormalities [de-identified] : [facility]

## 2022-09-11 NOTE — DISCUSSION/SUMMARY
[FreeTextEntry1] : Mr. Elfego Ambrocio is a pleasant 76 year-old man with hypertension, hyperlipidemia, thoracic aortic aneurysm, and persistent atrial fibrillation. He was diagnosed with AF in 2012 and treated with Sotalol. He had recurrence of AF in 2022 and had NADINE/DCCV on 5/30/2022. He has symptoms of fatigue and RODGERS when in AF. His CHADSVASC score is 3. He is on Eliquis.\par \par No bleeding on Eliquis. I recommend to continue same medications. I will lower Amiodarone to 100 mg daily after ablation and stop it in December 2022.\par \par The management strategies for atrial fibrillation were discussed focusing on the issues of stroke prevention, heart rate control and rhythm control. The options of rate control, antiarrhythmic drug therapy, and electrophysiologic testing and catheter ablation therapy were discussed at length. As he is not keen on long term antiarrhythmic medication, he is a good candidate for catheter ablation of atrial fibrillation in the form of pulmonary vein isolation. I have discussed the procedure with him in great detail. He was told this procedure is performed under anesthesia with the duration of about four hours. Possible complications include but not limited to bleeding, vascular injury, groin complications, cardiac tamponade, stroke, esophageal injury, pulmonary vein stenosis, need for pacemaker, need for cardiac surgery, and rare risks of esophageal fistula/stroke/heart attack/death. Intracardiac echo is used to monitor the procedure. In addition, we use a temperature probe in the esophagus to prevent lesions. The success rate is in the range 70-80%. About 20% of patients require a second procedure for pulmonary vein reconnection. \par \par Procedure will be planned on 9/1/2022.\par \par He verbalized understanding of the discussion and all questions were addressed and answered. He expressed agreement in proceeding with EP study and ablation. My  will be in contact with her for finalizing date, preadmission testing and instructions. Patient will follow with me in 2 months’ time. Please do not hesitate to contact me at [phone] if you have any further questions regarding this patient care.\par \par  [EKG obtained to assist in diagnosis and management of assessed problem(s)] : EKG obtained to assist in diagnosis and management of assessed problem(s)

## 2022-09-15 DIAGNOSIS — I10 ESSENTIAL (PRIMARY) HYPERTENSION: ICD-10-CM

## 2022-09-15 DIAGNOSIS — K29.70 GASTRITIS, UNSPECIFIED, WITHOUT BLEEDING: ICD-10-CM

## 2022-09-15 DIAGNOSIS — Z85.828 PERSONAL HISTORY OF OTHER MALIGNANT NEOPLASM OF SKIN: ICD-10-CM

## 2022-09-15 DIAGNOSIS — E78.5 HYPERLIPIDEMIA, UNSPECIFIED: ICD-10-CM

## 2022-09-15 DIAGNOSIS — I48.19 OTHER PERSISTENT ATRIAL FIBRILLATION: ICD-10-CM

## 2022-09-15 DIAGNOSIS — Z79.01 LONG TERM (CURRENT) USE OF ANTICOAGULANTS: ICD-10-CM

## 2022-09-26 ENCOUNTER — APPOINTMENT (OUTPATIENT)
Dept: PULMONOLOGY | Facility: CLINIC | Age: 77
End: 2022-09-26

## 2022-10-04 NOTE — ED ADULT NURSE NOTE - NS ED NURSE TRANSPORT WITH
Dominik Koch 60 y o  male MRN: [id number]    Encounter: [id number]      Assessment/Plan     Assessment: This is a 60y o -year-old male with   1-T2DM: > 10 yrs/ no h/o pancreatitis/ controlled( A1c has dropped to 5 7%)  Mentions mid afternoon hypoglycemia/ on 5 agents/ updated eye exam showing some left eye retionpathy  2-dyslipidemia: on 2 agents and last panel 8 months ago and acceptable parameters  3-HTN/ proteniuria: stable and improved/ though his GFR has dropped from 80 to 60 range  4-carcinoid ( rectal): Per D  Ali/ stable and no imaging needed at this time  5-MG: still getting droopy lids by the end of the day ( per his neurologist)    Plan:  1-all meds same   But decrease basaglar to 55 U q an  RTV in 3 months with test results; lipid panel, A1c,,,,,  Consider one time nephro consult ; though on all approved meds for proteinuria    CC: Diabetes    History of Present Illness     HPI:  See assessment     Review of Systems   Constitutional: Negative for appetite change, fatigue and unexpected weight change  HENT: Negative for mouth sores, sinus pain and trouble swallowing  Eyes: Negative for visual disturbance  Respiratory: Negative for cough, chest tightness and shortness of breath  Cardiovascular: Negative for chest pain, palpitations and leg swelling  Gastrointestinal: Negative for abdominal distention, abdominal pain, blood in stool, constipation, diarrhea, nausea and vomiting  Endocrine: Negative for polyphagia and polyuria  Genitourinary: Negative for dysuria, frequency and genital sores  Skin: Negative for rash and wound  Neurological: Negative for weakness, numbness and headaches  Hematological: Does not bruise/bleed easily  Psychiatric/Behavioral: Negative for confusion         Historical Information   Past Medical History:   Diagnosis Date    BPH (benign prostatic hyperplasia)     Carcinoid tumor of colon     Carcinoid tumor of rectum     Colon polyp     CPAP (continuous positive airway pressure) dependence     Diabetes mellitus ([facility] [address] )     Diabetic eye exam ([facility] [address] ) 2020    Hyperlipidemia     Hypernatremia     Hypertension     Iron deficiency     Liver hemangioma     Myasthenia gravis ([facility] [address] )     Nephropathy     Retinopathy     Sleep apnea      Past Surgical History:   Procedure Laterality Date    BACK SURGERY  01/01/2001    lump removed     COLONOSCOPY      1/1/12,1/1/2016 tumor removed     DENTAL SURGERY      FL RETROGRADE PYELOGRAM  6/25/2020    NASAL POLYP EXCISION      IA COLONOSCOPY FLX DX W/COLLJ SPEC WHEN PFRMD N/A 7/19/2016    Procedure: COLONOSCOPY;  Surgeon: Pietro Dejesus MD;  Location: [facility]; Service: Gastroenterology    IA CYSTOURETHROSCOPY,FULGUR <0 5 CM LESN N/A 6/25/2020    Procedure: TRANSURETHRAL RESECTION OF BLADDER TUMOR (TURBT);   Surgeon: Cheyenne Ontiveros MD;  Location: [facility];  Service: Urology    IA CYSTOURETHROSCOPY,URETER CATHETER Bilateral 6/25/2020    Procedure: Trudy Rossi WITH RETROGRADE PYELOGRAM;  Surgeon: Cheyenne Ontiveros MD;  [facility]: [facility];  Service: Urology    IA REPAIR UMBILICAL MLDX,7+Y/Z,BDUMG N/A 7/8/2021    Procedure: REPAIR HERNIA UMBILICAL WITH MESH;  Surgeon: Marisabel Fountain MD;  Location: [facility];  Service: General    TRANSURETHRAL RESECTION OF PROSTATE       Social History   Social History     Substance and Sexual Activity   Alcohol Use Yes    Alcohol/week: 1 0 standard drink    Types: 1 Glasses of wine per week    Comment: 1 per week     Social History     Substance and Sexual Activity   Drug Use Never     Social History     Tobacco Use   Smoking Status Never Smoker   Smokeless Tobacco Never Used     Family History:   Family History   Problem Relation Age of Onset    Diabetes Mother     Heart disease Father     Heart failure Father     Prostate cancer Father     No Known Problems Sister        Meds/Allergies   Current Outpatient Medications   Medication Sig Dispense Refill    amLODIPine (NORVASC) 10 mg tablet TAKE 1 TABLET EVERY DAY 90 tablet 2    Ascorbic Acid (VITAMIN C) 500 MG CAPS Take 1 tablet by mouth daily      aspirin 81 MG tablet Take 81 mg by mouth daily   atorvastatin (LIPITOR) 40 mg tablet Take 1 tablet (40 mg total) by mouth daily at bedtime 90 tablet 0    B Complex Vitamins (VITAMIN B COMPLEX PO) Take by mouth      B-D UF III MINI PEN NEEDLES 31G X 5 MM MISC USE 1 DAILY  5    Basaglar KwikPen 100 units/mL SOPN Inject 0 6 mL (60 Units total) under the skin daily      Cholecalciferol 50 MCG (2000 UT) CAPS Take 2 tablets by mouth 2 (two) times a day Once daily      Cinnamon 500 MG TABS Take by mouth 2 (two) times a day       coenzyme Q-10 100 MG capsule Take 2 capsules by mouth daily      Cyanocobalamin (Vitamin B-12) 2500 MCG SUBL Place under the tongue      Ferrous Sulfate (Iron) 325 (65 Fe) MG TABS Take by mouth 2 (two) times a day      GARLIC PO Take by mouth      glipiZIDE (GLUCOTROL XL) 10 mg 24 hr tablet Take 5 mg by mouth daily       Icosapent Ethyl 1 g CAPS Take 2 capsules (2 g total) by mouth 2 (two) times a day before lunch and dinner 360 capsule 2    Januvia 100 MG tablet Take 1 tablet (100 mg total) by mouth daily 90 tablet 2    Jardiance 10 MG TABS tablet Take 1 tablet (10 mg total) by mouth daily      loratadine (CLARITIN) 10 mg tablet Take 10 mg by mouth daily      metFORMIN (GLUCOPHAGE-XR) 500 mg 24 hr tablet Take 1,000 mg by mouth 2 (two) times a day      metoprolol succinate (TOPROL-XL) 100 mg 24 hr tablet TAKE 1 TABLET BY MOUTH DAILY 90 tablet 3    Multiple Vitamin (MULTI-VITAMIN DAILY) TABS Take 1 tablet by mouth daily      olmesartan (BENICAR) 40 mg tablet TAKE 1 TABLET BY MOUTH EVERY DAY 90 tablet 2    pyridostigmine (MESTINON) 60 mg tablet Take 60 mg by mouth 3 (three) times a day        triamterene-hydrochlorothiazide (MAXZIDE-25) 37 5-25 mg per tablet TAKE 1 TABLET BY MOUTH EVERY DAY 90 tablet 3    TURMERIC PO Take by mouth      zinc gluconate 50 mg tablet Take 50 mg by mouth daily      Accu-Chek Danielle Plus test strip TEST BLOOD SUGARS ONCE DAILY (Patient not taking: Reported on 10/4/2022)      BD Pen Needle Nelsy U/F 32G X 4 MM MISC 2 (two) times a day Test (Patient not taking: Reported on 10/4/2022)      Icosapent Ethyl 1 g CAPS Take 2 tablets twice a days (Patient not taking: Reported on 10/4/2022) 360 capsule 1    sitaGLIPtin (JANUVIA) 100 mg tablet Take 1 tablet (100 mg total) by mouth daily (Patient not taking: Reported on 10/4/2022) 90 tablet 2     No current facility-administered medications for this visit  No Known Allergies    Objective   Vitals: Blood pressure 132/72, pulse 85, temperature 97 6 °F (36 4 °C), temperature source Temporal, height 5' 6" (1 676 m), weight 91 kg (200 lb 9 6 oz), SpO2 97 %  Physical Exam  Vitals reviewed  Constitutional:       Appearance: He is obese  HENT:      Head: Normocephalic and atraumatic  Eyes:      Extraocular Movements: Extraocular movements intact  Neck:      Thyroid: No thyromegaly  Vascular: No carotid bruit  Cardiovascular:      Rate and Rhythm: Normal rate and regular rhythm  Pulses: Normal pulses  Dorsalis pedis pulses are 2+ on the right side and 2+ on the left side  Heart sounds: Normal heart sounds  No murmur heard  No friction rub  No gallop  Pulmonary:      Effort: Pulmonary effort is normal       Breath sounds: Normal breath sounds  No stridor  No wheezing, rhonchi or rales  Abdominal:      General: Bowel sounds are normal       Palpations: Abdomen is soft  There is no mass  Musculoskeletal:         General: No swelling or deformity  Cervical back: No tenderness  Right lower leg: No edema  Left lower leg: No edema  Right foot: No deformity  Left foot: No deformity  Feet:      Right foot:      Protective Sensation: 8 sites tested  8 sites sensed        Skin integrity: Skin integrity normal       Toenail Condition: Right toenails are normal       Left foot:      Protective Sensation: 8 sites tested  8 sites sensed  Skin integrity: Skin integrity normal       Toenail Condition: Left toenails are normal       Comments: Moles on his soles are followed by his dermatologist  Lymphadenopathy:      Cervical: No cervical adenopathy  Skin:     General: Skin is warm  Coloration: Skin is not jaundiced  Findings: No rash  Neurological:      General: No focal deficit present  Mental Status: He is alert and oriented to person, place, and time  Psychiatric:         Mood and Affect: Mood normal          Behavior: Behavior normal          The history was obtained from the review of the chart, patient  Lab Results:   Lab Results   Component Value Date/Time    Hemoglobin A1C 5 9 (H) 09/28/2022 07:54 AM    Hemoglobin A1C 6 2 (H) 02/15/2022 08:14 AM    WBC 4 92 10/18/2021 08:26 AM    Hemoglobin 14 8 10/18/2021 08:26 AM    Hematocrit 47 5 10/18/2021 08:26 AM    MCV 85 10/18/2021 08:26 AM    Platelets 765 60/00/5550 08:26 AM    BUN 14 09/28/2022 07:54 AM    BUN 14 02/15/2022 08:14 AM    BUN 13 10/18/2021 08:26 AM    Potassium 3 9 09/28/2022 07:54 AM    Potassium 3 8 02/15/2022 08:14 AM    Potassium 3 4 (L) 10/18/2021 08:26 AM    Chloride 107 09/28/2022 07:54 AM    Chloride 107 02/15/2022 08:14 AM    Chloride 107 10/18/2021 08:26 AM    CO2 33 (H) 09/28/2022 07:54 AM    CO2 32 02/15/2022 08:14 AM    CO2 32 10/18/2021 08:26 AM    Creatinine 1 15 09/28/2022 07:54 AM    Creatinine 1 18 02/15/2022 08:14 AM    Creatinine 1 12 10/18/2021 08:26 AM    AST 16 09/28/2022 07:54 AM    AST 23 10/18/2021 08:26 AM    ALT 23 09/28/2022 07:54 AM    ALT 29 10/18/2021 08:26 AM    Albumin 4 0 09/28/2022 07:54 AM    Albumin 4 1 10/18/2021 08:26 AM    HDL, Direct 44 02/15/2022 08:14 AM    Triglycerides 130 02/15/2022 08:14 AM           Imaging Studies: I have personally reviewed pertinent reports        Portions of the record may have been created with voice recognition software  Occasional wrong word or "sound a like" substitutions may have occurred due to the inherent limitations of voice recognition software  Read the chart carefully and recognize, using context, where substitutions have occurred  Cardiac Monitor/Defib/ACLS/Rescue Kit/O2/BVM

## 2022-10-11 ENCOUNTER — APPOINTMENT (OUTPATIENT)
Dept: CARDIOLOGY | Facility: CLINIC | Age: 77
End: 2022-10-11

## 2022-10-11 VITALS
WEIGHT: 215 LBS | SYSTOLIC BLOOD PRESSURE: 136 MMHG | TEMPERATURE: 97.9 F | BODY MASS INDEX: 24.88 KG/M2 | RESPIRATION RATE: 16 BRPM | HEART RATE: 68 BPM | HEIGHT: 78 IN | DIASTOLIC BLOOD PRESSURE: 81 MMHG

## 2022-10-11 PROCEDURE — 99214 OFFICE O/P EST MOD 30 MIN: CPT

## 2022-10-11 PROCEDURE — 93000 ELECTROCARDIOGRAM COMPLETE: CPT

## 2022-10-11 RX ORDER — DIAZEPAM 2 MG/1
2 TABLET ORAL
Qty: 20 | Refills: 0 | Status: COMPLETED | COMMUNITY
Start: 2022-08-04 | End: 2022-10-11

## 2022-10-11 NOTE — HISTORY OF PRESENT ILLNESS
[FreeTextEntry1] : hypertension, hyperlipidemia, thoracic aneurysm, persistent atrial fibrillation\par diagnosed with AF in ~2012 treated with sotalol and Eliquis\par Hospitalized in March 2022 for pneumonia and had recurrence of AF\par remained in AD with symptoms of increased fatigue and mild RODGERS\par NADINE-DCCV on 5/30/2022\par He has no chest pain, no shortness of breath, no dyspnea on exertion, no orthopnea, no PND. He denies dizziness, lightheadedness and syncope. He has no exertional symptoms. He presents for evaluation.\par \par 10/11: s/p AF ablation. Feels great. Doing gentle exercises\par \par EKG (10/11): SR

## 2022-10-11 NOTE — ASSESSMENT
[FreeTextEntry1] : ## Persistent Atrial Fibrillation \par ## HTN\par \par - - On Eliquis. Compliant. No bleeding issues. Patient to contact us if there is any bleeding issues, interruption or any issues with OAC. Patient to go to ER/call 911 if any major bleeding. \par - In sinus today. Feels great. On Metoprolol and Amiodarone\par - BP better controlled today\par - .CMP, TSH q6 months\par - Return in 3 months

## 2022-10-24 ENCOUNTER — APPOINTMENT (OUTPATIENT)
Dept: CARDIOLOGY | Facility: CLINIC | Age: 77
End: 2022-10-24

## 2022-10-28 NOTE — PATIENT PROFILE ADULT - PATIENT REPRESENTATIVE: ( YOU CAN CHOOSE ANY PERSON THAT CAN ASSIST YOU WITH YOUR HEALTH CARE PREFERENCES, DOES NOT HAVE TO BE A SPOUSE, IMMEDIATE FAMILY OR SIGNIFICANT OTHER/PARTNER)
declines Tazorac Pregnancy And Lactation Text: This medication is not safe during pregnancy. It is unknown if this medication is excreted in breast milk.

## 2023-01-04 ENCOUNTER — NON-APPOINTMENT (OUTPATIENT)
Age: 78
End: 2023-01-04

## 2023-01-14 NOTE — ASU PREOP CHECKLIST - WEIGHT IN LBS
Goal Outcome Evaluation:       Patient and daughter, who is serving as , are in the room. I went over discharge paperwork with daughter who then interpreted it to the patient. Discharge meds were filled at our outpatient pharmacy with the exception of the lidocaine cream that our outpatient pharmacy is currently out of. Instructions are to have the daughter call a pharmacy of her choice to have the prescription transferred.                   223.1

## 2023-01-17 ENCOUNTER — NON-APPOINTMENT (OUTPATIENT)
Age: 78
End: 2023-01-17

## 2023-03-01 NOTE — H&P PST ADULT - NSSUBSTANCEUSE_GEN_ALL_CORE_SD
never used Otezla Counseling: The side effects of Otezla were discussed with the patient, including but not limited to worsening or new depression, weight loss, diarrhea, nausea, upper respiratory tract infection, and headache. Patient instructed to call the office should any adverse effect occur.  The patient verbalized understanding of the proper use and possible adverse effects of Otezla.  All the patient's questions and concerns were addressed.

## 2023-04-03 ENCOUNTER — APPOINTMENT (OUTPATIENT)
Dept: ELECTROPHYSIOLOGY | Facility: CLINIC | Age: 78
End: 2023-04-03
Payer: MEDICARE

## 2023-04-03 ENCOUNTER — APPOINTMENT (OUTPATIENT)
Dept: UROLOGY | Facility: CLINIC | Age: 78
End: 2023-04-03
Payer: MEDICARE

## 2023-04-03 VITALS
BODY MASS INDEX: 24.88 KG/M2 | HEART RATE: 72 BPM | OXYGEN SATURATION: 98 % | DIASTOLIC BLOOD PRESSURE: 73 MMHG | WEIGHT: 215 LBS | SYSTOLIC BLOOD PRESSURE: 138 MMHG | TEMPERATURE: 97.1 F | HEIGHT: 78 IN | RESPIRATION RATE: 18 BRPM

## 2023-04-03 VITALS
BODY MASS INDEX: 22.48 KG/M2 | HEART RATE: 65 BPM | SYSTOLIC BLOOD PRESSURE: 120 MMHG | WEIGHT: 215 LBS | DIASTOLIC BLOOD PRESSURE: 80 MMHG

## 2023-04-03 DIAGNOSIS — E78.49 OTHER HYPERLIPIDEMIA: ICD-10-CM

## 2023-04-03 PROCEDURE — 93000 ELECTROCARDIOGRAM COMPLETE: CPT

## 2023-04-03 PROCEDURE — 99214 OFFICE O/P EST MOD 30 MIN: CPT

## 2023-04-03 RX ORDER — FLUTICASONE FUROATE AND VILANTEROL TRIFENATATE 100; 25 UG/1; UG/1
100-25 POWDER RESPIRATORY (INHALATION) DAILY
Refills: 0 | Status: ACTIVE | COMMUNITY

## 2023-04-03 RX ORDER — PANTOPRAZOLE 40 MG/1
40 TABLET, DELAYED RELEASE ORAL DAILY
Refills: 0 | Status: ACTIVE | COMMUNITY

## 2023-04-03 RX ORDER — AMIODARONE HYDROCHLORIDE 200 MG/1
200 TABLET ORAL DAILY
Qty: 45 | Refills: 0 | Status: DISCONTINUED | COMMUNITY
End: 2023-04-03

## 2023-04-03 RX ORDER — PNV NO.95/FERROUS FUM/FOLIC AC 28MG-0.8MG
TABLET ORAL
Refills: 0 | Status: ACTIVE | COMMUNITY

## 2023-04-03 RX ORDER — ESCITALOPRAM OXALATE 5 MG/1
5 TABLET ORAL DAILY
Refills: 0 | Status: ACTIVE | COMMUNITY

## 2023-04-03 RX ORDER — METOPROLOL SUCCINATE 25 MG/1
25 TABLET, EXTENDED RELEASE ORAL
Refills: 0 | Status: ACTIVE | COMMUNITY

## 2023-04-03 RX ORDER — ALCOHOL ANTISEPTIC PADS
PADS, MEDICATED (EA) TOPICAL DAILY
Refills: 0 | Status: ACTIVE | COMMUNITY

## 2023-04-03 RX ORDER — CHROMIUM 200 MCG
TABLET ORAL DAILY
Refills: 0 | Status: ACTIVE | COMMUNITY

## 2023-04-03 RX ORDER — APIXABAN 5 MG/1
5 TABLET, FILM COATED ORAL
Refills: 0 | Status: ACTIVE | COMMUNITY

## 2023-04-03 RX ORDER — LOSARTAN POTASSIUM 100 MG/1
100 TABLET, FILM COATED ORAL
Refills: 0 | Status: ACTIVE | COMMUNITY

## 2023-04-03 RX ORDER — ROSUVASTATIN CALCIUM 10 MG/1
10 TABLET, FILM COATED ORAL
Refills: 0 | Status: ACTIVE | COMMUNITY

## 2023-04-03 RX ORDER — AMLODIPINE BESYLATE 5 MG/1
5 TABLET ORAL DAILY
Qty: 30 | Refills: 0 | Status: ACTIVE | COMMUNITY

## 2023-04-03 NOTE — DISCUSSION/SUMMARY
[FreeTextEntry1] : Mr. Elfego Ambrocio is a pleasant 77 year-old man with hypertension, hyperlipidemia, thoracic aortic aneurysm, and persistent atrial fibrillation. He was diagnosed with AF in 2012 and treated with Sotalol. He had recurrence of AF in 2022 and had NADINE/DCCV on 5/30/2022. He has symptoms of fatigue and RODGERS when in AF. His CHADSVASC score is 3. He is on Eliquis. He underwent successful ablation 9/1/2022: RF PVI + PWI + CTI\par \par No bleeding on Eliquis. I recommend to continue same medications. Amiodarone 100 mg daily after ablation, I recommend to stop it on 4/3/2023. \par \par Patient has no arrhythmias since the catheter ablation. There are no groin hematomas or bleeding. Patient is pleased with results of catheter ablation although is aware with the risk of recurrent symptoms and need for another ablation. Post ablation care was discussed in great length. I discussed with patient contacting me in case of any symptoms suggestive of recurrence.\par \par I discussed with patient plan of care in great details. I answered all his questions to his satisfaction. Patient was pleased with the visit.\par \par Patient will follow with me in 12 months’ time. Please do not hesitate to contact me at [phone] if you have any further questions regarding this patient care.\par \par  [EKG obtained to assist in diagnosis and management of assessed problem(s)] : EKG obtained to assist in diagnosis and management of assessed problem(s)

## 2023-04-03 NOTE — HISTORY OF PRESENT ILLNESS
[FreeTextEntry1] : hypertension, hyperlipidemia, thoracic aneurysm, persistent atrial fibrillation\par diagnosed with AF in ~2012 treated with sotalol and Eliquis\par Hospitalized in March 2022 for pneumonia and had recurrence of AF\par remained in AD with symptoms of increased fatigue and mild RODGERS\par NADINE-DCCV on 5/30/2022\par 9/1/2022: RF PVI + PWI + CTI\par 4/3/2023: no Afib. occasional palpitations; using samsung watch\par He has no chest pain, no shortness of breath, no dyspnea on exertion, no orthopnea, no PND. He denies dizziness, lightheadedness and syncope. He has no exertional symptoms. He presents for evaluation.\par \par

## 2023-04-03 NOTE — CARDIOLOGY SUMMARY
[de-identified] : (4/3/2023) sinus rhythm at 65 bpm, no significant ST/T abnormalities\par (7/8/2022) sinus rhythm at 69 bpm, no significant ST/T abnormalities [de-identified] : 9/1/2022: RF PVI + PWI + CTI\par (May 2022) TANVIR

## 2023-04-05 NOTE — ASSESSMENT
[FreeTextEntry1] : 78 yo with microhematuria\par denies prior workup\par no clear evidence of microhematuria prior to present analysis\par \par - UA, Culture, Cytology\par - PSA\par - renal and bladder US\par - cystoscopy next available\par \par discussed fully the plan of care\par all questions answered

## 2023-04-05 NOTE — LETTER BODY
[Dear  ___] : Dear  [unfilled], [Consult Letter:] : I had the pleasure of evaluating your patient, [unfilled]. [Please see my note below.] : Please see my note below. [Sincerely,] : Sincerely, [FreeTextEntry3] : Sung Mai MD, FACS\par

## 2023-04-05 NOTE — HISTORY OF PRESENT ILLNESS
[FreeTextEntry1] : 78 yo male referred for microscopic hematuria\par \par last UA with RBCs was in 2/2023 - 3-10 RBCs / hpf 2/2023 - [facility] \par \par denies gross hematuria\par denies dysuria\par denies instrumentation\par \par no prior cystoscopy\par \par  [Urinary Incontinence] : no urinary incontinence [Urinary Retention] : no urinary retention [Urinary Urgency] : no urinary urgency [Urinary Frequency] : no urinary frequency [Nocturia] : no nocturia [Straining] : no straining [Weak Stream] : no weak stream [Erectile Dysfunction] : no Erectile Dysfunction [Hematuria - Microscopic] : microscopic hematuria

## 2023-04-20 LAB
APPEARANCE: CLEAR
BACTERIA UR CULT: NORMAL
BILIRUBIN URINE: NEGATIVE
BLOOD URINE: NEGATIVE
COLOR: NORMAL
GLUCOSE QUALITATIVE U: NEGATIVE
KETONES URINE: NEGATIVE
LEUKOCYTE ESTERASE URINE: NEGATIVE
NITRITE URINE: NEGATIVE
PH URINE: 6.5
PROTEIN URINE: NEGATIVE
PSA FREE FLD-MCNC: 36 %
PSA FREE SERPL-MCNC: 0.16 NG/ML
PSA SERPL-MCNC: 0.44 NG/ML
SPECIFIC GRAVITY URINE: 1.02
URINE CYTOLOGY: NORMAL
UROBILINOGEN URINE: NORMAL

## 2023-05-18 ENCOUNTER — APPOINTMENT (OUTPATIENT)
Dept: UROLOGY | Facility: CLINIC | Age: 78
End: 2023-05-18
Payer: MEDICARE

## 2023-05-18 DIAGNOSIS — R31.29 OTHER MICROSCOPIC HEMATURIA: ICD-10-CM

## 2023-05-18 PROCEDURE — 52000 CYSTOURETHROSCOPY: CPT

## 2023-05-21 PROBLEM — R31.29 MICROSCOPIC HEMATURIA: Status: ACTIVE | Noted: 2020-04-09

## 2023-05-21 LAB
BILIRUB UR QL STRIP: NORMAL
COLLECTION METHOD: NORMAL
GLUCOSE UR-MCNC: NORMAL
HCG UR QL: 0.2 EU/DL
HGB UR QL STRIP.AUTO: NORMAL
KETONES UR-MCNC: NORMAL
LEUKOCYTE ESTERASE UR QL STRIP: NORMAL
NITRITE UR QL STRIP: NORMAL
PH UR STRIP: 5.5
PROT UR STRIP-MCNC: 100
SP GR UR STRIP: 1.02

## 2023-06-21 ENCOUNTER — APPOINTMENT (OUTPATIENT)
Dept: NEUROLOGY | Facility: CLINIC | Age: 78
End: 2023-06-21
Payer: MEDICARE

## 2023-06-21 VITALS
WEIGHT: 215 LBS | OXYGEN SATURATION: 99 % | HEART RATE: 88 BPM | TEMPERATURE: 97.9 F | DIASTOLIC BLOOD PRESSURE: 80 MMHG | BODY MASS INDEX: 29.12 KG/M2 | SYSTOLIC BLOOD PRESSURE: 128 MMHG | HEIGHT: 72 IN

## 2023-06-21 DIAGNOSIS — G47.30 SLEEP APNEA, UNSPECIFIED: ICD-10-CM

## 2023-06-21 DIAGNOSIS — F39 UNSPECIFIED MOOD [AFFECTIVE] DISORDER: ICD-10-CM

## 2023-06-21 DIAGNOSIS — I48.19 OTHER PERSISTENT ATRIAL FIBRILLATION: ICD-10-CM

## 2023-06-21 DIAGNOSIS — F41.9 ANXIETY DISORDER, UNSPECIFIED: ICD-10-CM

## 2023-06-21 PROCEDURE — 99214 OFFICE O/P EST MOD 30 MIN: CPT

## 2023-06-21 RX ORDER — ICOSAPENT ETHYL 1000 MG/1
1 CAPSULE ORAL
Qty: 360 | Refills: 3 | Status: DISCONTINUED | COMMUNITY
End: 2023-06-21

## 2023-06-21 RX ORDER — CEFUROXIME AXETIL 250 MG/1
250 TABLET ORAL
Qty: 6 | Refills: 0 | Status: DISCONTINUED | COMMUNITY
Start: 2023-05-18 | End: 2023-06-21

## 2023-06-23 PROBLEM — G47.30 SLEEP APNEA: Status: ACTIVE | Noted: 2022-08-10

## 2023-06-23 PROBLEM — F39 MOOD DISORDER: Status: ACTIVE | Noted: 2020-03-16

## 2023-06-23 PROBLEM — I48.19 PERSISTENT ATRIAL FIBRILLATION: Status: ACTIVE | Noted: 2022-09-11

## 2023-06-23 PROBLEM — F41.9 ANXIETY: Status: ACTIVE | Noted: 2020-06-01

## 2023-06-23 NOTE — ASSESSMENT
[FreeTextEntry1] : 1- Mood D/O\par 2-PTSD\par 3- Anemia\par 4-CAREN\par 5- A-fib\par \par \par plan \par continue with the current levell\par Xanax PRN\par

## 2023-06-23 NOTE — HISTORY OF PRESENT ILLNESS
[FreeTextEntry1] : pool is here for the F/U\par He is very much at his baseline . Still every couple of months as he puts it < out of no where he has one of those vent>\par he describes them as suddenly feeling tightness and a sense of anxiety and also not being able to talk. It could last for even couple of hours . During that he does not feel confused. makes a good memory of the ongoing events and interacts appropriately\par Having that he takes 1-2 Xanax which would resolve the issue.\par spends most of his time at home, taking care of his autistic son with Seizure\par his sleep is Ok. He is using his C-PAP\par He sees his PMD and the blood test  showed low ferritin ( done by PMD) \par occasionally has LBP

## 2023-06-23 NOTE — PHYSICAL EXAM
[FreeTextEntry1] : A/A/Ox3\par good mood\par good attention\par follows commands\par aware of the current events\par good memory\par normal executive behavior\par stable gait\par no dysmetria

## 2023-07-27 NOTE — H&P ADULT - PROBLEM SELECTOR PLAN 1
equal bilaterally
Patient had rhythmic movements of crossing and uncrossing his arms while outstretched in bed followed by confusion. Movements are not typical for a seizure episode however the rhythmic nature and the post ictal state are very suggestive of a tonic clonic state. Trigger of seizure is unclear, could be related to a very acute drop in blood pressure seeing as the patient went from 200/96 at 6:30PM to 178/86 at 10:30 PM to 127/76 at 1:30 AM leading to hypoperfusion, CVA? First CT Head shows no acute abnormality, f/u with neurology for repeat CT head in 24 hrs vs MRI . Other differential is hyponatremia seeing as patient came in with Na 126 from 143 3 days ago. Will correct hyponatremia and hold off on Keppra until Neurology recommendations, 1 dose of Keppra 1g IV was given in the ED.

## 2023-09-27 RX ORDER — BUTALBITAL, ACETAMINOPHEN AND CAFFEINE 300; 50; 40 MG/1; MG/1; MG/1
50-300-40 CAPSULE ORAL
Qty: 20 | Refills: 1 | Status: ACTIVE | COMMUNITY
Start: 2021-02-25 | End: 1900-01-01

## 2024-02-06 ENCOUNTER — APPOINTMENT (OUTPATIENT)
Dept: NEUROLOGY | Facility: CLINIC | Age: 79
End: 2024-02-06
Payer: MEDICARE

## 2024-02-06 VITALS
TEMPERATURE: 97.6 F | DIASTOLIC BLOOD PRESSURE: 72 MMHG | HEART RATE: 81 BPM | OXYGEN SATURATION: 97 % | SYSTOLIC BLOOD PRESSURE: 130 MMHG | WEIGHT: 215 LBS | HEIGHT: 72 IN | BODY MASS INDEX: 29.12 KG/M2

## 2024-02-06 PROCEDURE — 99213 OFFICE O/P EST LOW 20 MIN: CPT

## 2024-02-06 RX ORDER — ALPRAZOLAM 0.5 MG/1
0.5 TABLET ORAL
Qty: 30 | Refills: 0 | Status: ACTIVE | COMMUNITY
Start: 1900-01-01 | End: 1900-01-01

## 2024-02-06 NOTE — PHYSICAL EXAM
[FreeTextEntry1] : A/A/Ox3 good mood slightly apprehensive Follows 4 step commands normal language No drift No Dysmetria Normal Romberg

## 2024-02-06 NOTE — HISTORY OF PRESENT ILLNESS
[FreeTextEntry1] : Elfego is here for the F/U. He is at his baseline . He did have two periods of having cluster of his typical events in November and also january. He says each event is a sense of feeling anxious and a sense of shiver coming down from his head to the feet and not being able to talk. He denies being confused and has a good memory of the events. The cluster could last for 2-3 days  Taking one Xanax would relieve the symptoms and he usually feels calm after and sleeps. Otherwise he is at his baseline  Continues to be tearful when talks about [place] and the comrades that are dying He has night terrors and not sleeping well Overall says his mood is OK and content  He does take care of his finances and remembers things that he watches on TV

## 2024-04-10 ENCOUNTER — RX RENEWAL (OUTPATIENT)
Age: 79
End: 2024-04-10

## 2024-04-10 RX ORDER — DIVALPROEX SODIUM 250 MG/1
250 TABLET, DELAYED RELEASE ORAL
Qty: 90 | Refills: 0 | Status: ACTIVE | COMMUNITY
Start: 1900-01-01 | End: 1900-01-01

## 2024-06-24 ENCOUNTER — APPOINTMENT (OUTPATIENT)
Dept: ELECTROPHYSIOLOGY | Facility: CLINIC | Age: 79
End: 2024-06-24

## 2024-12-07 NOTE — ED ADULT NURSE NOTE - CAS DISCH CONDITION
Addendum  Transfer of care from JUVE Scales to JUVE Greene at 9 PM 12/6/2024.    Currently awaiting medical clearance.  Patient is medically cleared.  No SI/HI, visual or auditory hallucinations, no history of suicidal ideation, no means for hurting himself at home.    Reassessment: Patient is stable. No suicidal/homicidal ideations.  Patient is guilt ridden from an incident that happened long past.  I encouraged the patient to follow-up with [facility] for counseling and potential psychotherapy for complete resolution and further care.  Patient also needs to follow-up with [facility] for potential medication adjustments because symptoms of obsessive-compulsive disorder began after increasing patient's psych med dose from 50 to 75 mg.  Otherwise patient is cleared for discharge.  Presents no harm to himself or others.    Final diagnosis: Mental health problem     Cookie Lezama PA-FARIDA  12/06/24 2231     Stable

## 2025-04-15 ENCOUNTER — APPOINTMENT (OUTPATIENT)
Dept: ELECTROPHYSIOLOGY | Facility: CLINIC | Age: 80
End: 2025-04-15

## 2025-04-15 VITALS
SYSTOLIC BLOOD PRESSURE: 130 MMHG | BODY MASS INDEX: 29.12 KG/M2 | HEART RATE: 63 BPM | DIASTOLIC BLOOD PRESSURE: 72 MMHG | WEIGHT: 215 LBS | HEIGHT: 72 IN

## 2025-04-15 DIAGNOSIS — I10 ESSENTIAL (PRIMARY) HYPERTENSION: ICD-10-CM

## 2025-04-15 DIAGNOSIS — I48.19 OTHER PERSISTENT ATRIAL FIBRILLATION: ICD-10-CM

## 2025-04-15 PROCEDURE — 99215 OFFICE O/P EST HI 40 MIN: CPT

## 2025-04-15 PROCEDURE — 93000 ELECTROCARDIOGRAM COMPLETE: CPT | Mod: 59

## 2025-04-15 PROCEDURE — 93291 INTERROG DEV EVAL SCRMS IP: CPT

## 2025-04-22 ENCOUNTER — APPOINTMENT (OUTPATIENT)
Dept: NEUROLOGY | Facility: CLINIC | Age: 80
End: 2025-04-22
Payer: MEDICARE

## 2025-04-22 VITALS
WEIGHT: 223 LBS | OXYGEN SATURATION: 91 % | HEART RATE: 77 BPM | HEIGHT: 72 IN | BODY MASS INDEX: 30.2 KG/M2 | SYSTOLIC BLOOD PRESSURE: 145 MMHG | DIASTOLIC BLOOD PRESSURE: 86 MMHG

## 2025-04-22 DIAGNOSIS — G47.30 SLEEP APNEA, UNSPECIFIED: ICD-10-CM

## 2025-04-22 DIAGNOSIS — F39 UNSPECIFIED MOOD [AFFECTIVE] DISORDER: ICD-10-CM

## 2025-04-22 PROCEDURE — 99213 OFFICE O/P EST LOW 20 MIN: CPT

## 2025-04-22 NOTE — H&P PST ADULT - PAIN CHRONIC, PROFILE
Problem: Discharge Planning  Goal: Discharge to home or other facility with appropriate resources  Outcome: Progressing     Problem: Safety - Adult  Goal: Free from fall injury  Outcome: Progressing     Problem: Gastrointestinal - Adult  Goal: Minimal or absence of nausea and vomiting  Outcome: Progressing     Problem: Gastrointestinal - Adult  Goal: Maintains or returns to baseline bowel function  Outcome: Progressing      no

## 2025-04-26 RX ORDER — UBIDECARENONE 100 MG
100 CAPSULE ORAL DAILY
Refills: 0 | Status: ACTIVE | COMMUNITY

## 2025-05-05 ENCOUNTER — APPOINTMENT (OUTPATIENT)
Dept: PULMONOLOGY | Facility: CLINIC | Age: 80
End: 2025-05-05
Payer: MEDICARE

## 2025-05-05 VITALS
HEART RATE: 76 BPM | OXYGEN SATURATION: 95 % | DIASTOLIC BLOOD PRESSURE: 76 MMHG | RESPIRATION RATE: 16 BRPM | BODY MASS INDEX: 29.84 KG/M2 | WEIGHT: 220 LBS | SYSTOLIC BLOOD PRESSURE: 134 MMHG

## 2025-05-05 DIAGNOSIS — R40.0 SOMNOLENCE: ICD-10-CM

## 2025-05-05 DIAGNOSIS — G47.30 SLEEP APNEA, UNSPECIFIED: ICD-10-CM

## 2025-05-05 DIAGNOSIS — G47.69 OTHER SLEEP RELATED MOVEMENT DISORDERS: ICD-10-CM

## 2025-05-05 PROCEDURE — G2211 COMPLEX E/M VISIT ADD ON: CPT

## 2025-05-05 PROCEDURE — 99213 OFFICE O/P EST LOW 20 MIN: CPT

## 2025-05-10 ENCOUNTER — OUTPATIENT (OUTPATIENT)
Dept: OUTPATIENT SERVICES | Facility: HOSPITAL | Age: 80
LOS: 1 days | Discharge: HOME | End: 2025-05-10
Payer: MEDICARE

## 2025-05-10 ENCOUNTER — APPOINTMENT (OUTPATIENT)
Dept: SLEEP CENTER | Facility: HOSPITAL | Age: 80
End: 2025-05-10

## 2025-05-10 DIAGNOSIS — G47.33 OBSTRUCTIVE SLEEP APNEA (ADULT) (PEDIATRIC): ICD-10-CM

## 2025-05-10 DIAGNOSIS — K40.20 BILATERAL INGUINAL HERNIA, WITHOUT OBSTRUCTION OR GANGRENE, NOT SPECIFIED AS RECURRENT: Chronic | ICD-10-CM

## 2025-05-10 DIAGNOSIS — Z85.89 PERSONAL HISTORY OF MALIGNANT NEOPLASM OF OTHER ORGANS AND SYSTEMS: Chronic | ICD-10-CM

## 2025-05-10 PROCEDURE — 95810 POLYSOM 6/> YRS 4/> PARAM: CPT | Mod: 26

## 2025-05-10 PROCEDURE — 95810 POLYSOM 6/> YRS 4/> PARAM: CPT

## 2025-05-13 DIAGNOSIS — G47.33 OBSTRUCTIVE SLEEP APNEA (ADULT) (PEDIATRIC): ICD-10-CM

## 2025-05-16 ENCOUNTER — APPOINTMENT (OUTPATIENT)
Dept: CARDIOLOGY | Facility: CLINIC | Age: 80
End: 2025-05-16
Payer: MEDICARE

## 2025-05-16 ENCOUNTER — NON-APPOINTMENT (OUTPATIENT)
Age: 80
End: 2025-05-16

## 2025-05-16 PROCEDURE — 93298 REM INTERROG DEV EVAL SCRMS: CPT

## 2025-06-03 ENCOUNTER — NON-APPOINTMENT (OUTPATIENT)
Age: 80
End: 2025-06-03

## 2025-06-17 ENCOUNTER — APPOINTMENT (OUTPATIENT)
Dept: PULMONOLOGY | Facility: CLINIC | Age: 80
End: 2025-06-17
Payer: MEDICARE

## 2025-06-17 VITALS
HEART RATE: 69 BPM | OXYGEN SATURATION: 93 % | DIASTOLIC BLOOD PRESSURE: 84 MMHG | SYSTOLIC BLOOD PRESSURE: 130 MMHG | BODY MASS INDEX: 30.52 KG/M2 | WEIGHT: 225 LBS

## 2025-06-17 PROCEDURE — 99213 OFFICE O/P EST LOW 20 MIN: CPT

## 2025-06-17 PROCEDURE — G2211 COMPLEX E/M VISIT ADD ON: CPT

## 2025-06-19 ENCOUNTER — NON-APPOINTMENT (OUTPATIENT)
Age: 80
End: 2025-06-19

## 2025-06-19 ENCOUNTER — APPOINTMENT (OUTPATIENT)
Dept: CARDIOLOGY | Facility: CLINIC | Age: 80
End: 2025-06-19
Payer: MEDICARE

## 2025-06-19 PROCEDURE — 93298 REM INTERROG DEV EVAL SCRMS: CPT

## 2025-07-09 ENCOUNTER — APPOINTMENT (OUTPATIENT)
Dept: NEUROLOGY | Facility: CLINIC | Age: 80
End: 2025-07-09
Payer: MEDICARE

## 2025-07-09 PROCEDURE — 95816 EEG AWAKE AND DROWSY: CPT

## 2025-07-10 PROCEDURE — 95708 EEG WO VID EA 12-26HR UNMNTR: CPT

## 2025-07-11 ENCOUNTER — APPOINTMENT (OUTPATIENT)
Dept: NEUROLOGY | Facility: CLINIC | Age: 80
End: 2025-07-11

## 2025-07-11 PROCEDURE — 95708 EEG WO VID EA 12-26HR UNMNTR: CPT

## 2025-07-11 PROCEDURE — 95700 EEG CONT REC W/VID EEG TECH: CPT

## 2025-07-11 PROCEDURE — 95721 EEG PHY/QHP>36<60 HR W/O VID: CPT

## 2025-07-23 ENCOUNTER — NON-APPOINTMENT (OUTPATIENT)
Age: 80
End: 2025-07-23

## 2025-07-23 ENCOUNTER — APPOINTMENT (OUTPATIENT)
Dept: CARDIOLOGY | Facility: CLINIC | Age: 80
End: 2025-07-23
Payer: MEDICARE

## 2025-07-23 PROCEDURE — 93298 REM INTERROG DEV EVAL SCRMS: CPT

## 2025-08-26 ENCOUNTER — APPOINTMENT (OUTPATIENT)
Dept: PULMONOLOGY | Facility: CLINIC | Age: 80
End: 2025-08-26
Payer: MEDICARE

## 2025-08-26 ENCOUNTER — APPOINTMENT (OUTPATIENT)
Dept: CARDIOLOGY | Facility: CLINIC | Age: 80
End: 2025-08-26

## 2025-08-26 ENCOUNTER — NON-APPOINTMENT (OUTPATIENT)
Age: 80
End: 2025-08-26

## 2025-08-26 VITALS
SYSTOLIC BLOOD PRESSURE: 144 MMHG | HEART RATE: 66 BPM | BODY MASS INDEX: 29.57 KG/M2 | OXYGEN SATURATION: 95 % | WEIGHT: 218 LBS | DIASTOLIC BLOOD PRESSURE: 86 MMHG

## 2025-08-26 DIAGNOSIS — G47.30 SLEEP APNEA, UNSPECIFIED: ICD-10-CM

## 2025-08-26 DIAGNOSIS — I48.19 OTHER PERSISTENT ATRIAL FIBRILLATION: ICD-10-CM

## 2025-08-26 PROCEDURE — G2211 COMPLEX E/M VISIT ADD ON: CPT

## 2025-08-26 PROCEDURE — 99213 OFFICE O/P EST LOW 20 MIN: CPT

## 2025-08-26 PROCEDURE — 93298 REM INTERROG DEV EVAL SCRMS: CPT

## 2025-08-30 ENCOUNTER — NON-APPOINTMENT (OUTPATIENT)
Age: 80
End: 2025-08-30

## 2025-09-10 ENCOUNTER — TRANSCRIPTION ENCOUNTER (OUTPATIENT)
Age: 80
End: 2025-09-10

## 2025-09-19 ENCOUNTER — NON-APPOINTMENT (OUTPATIENT)
Age: 80
End: 2025-09-19